# Patient Record
Sex: FEMALE | Race: BLACK OR AFRICAN AMERICAN | Employment: FULL TIME | URBAN - METROPOLITAN AREA
[De-identification: names, ages, dates, MRNs, and addresses within clinical notes are randomized per-mention and may not be internally consistent; named-entity substitution may affect disease eponyms.]

---

## 2017-05-19 ENCOUNTER — HOSPITAL ENCOUNTER (EMERGENCY)
Age: 32
Discharge: HOME | End: 2017-05-19
Attending: EMERGENCY MEDICINE
Payer: COMMERCIAL

## 2017-05-19 ENCOUNTER — APPOINTMENT (OUTPATIENT)
Dept: CT IMAGING | Age: 32
End: 2017-05-19
Payer: COMMERCIAL

## 2017-05-19 ENCOUNTER — APPOINTMENT (OUTPATIENT)
Dept: GENERAL RADIOLOGY | Age: 32
End: 2017-05-19
Payer: COMMERCIAL

## 2017-05-19 VITALS
DIASTOLIC BLOOD PRESSURE: 64 MMHG | RESPIRATION RATE: 16 BRPM | HEIGHT: 66 IN | BODY MASS INDEX: 22.5 KG/M2 | HEART RATE: 77 BPM | OXYGEN SATURATION: 98 % | SYSTOLIC BLOOD PRESSURE: 120 MMHG | WEIGHT: 140 LBS | TEMPERATURE: 98.1 F

## 2017-05-19 DIAGNOSIS — R07.9 NONSPECIFIC CHEST PAIN: Primary | ICD-10-CM

## 2017-05-19 DIAGNOSIS — F43.9 SITUATIONAL STRESS: ICD-10-CM

## 2017-05-19 LAB
% CKMB: 1.2 % (ref 0–3)
ABSOLUTE EOS #: 0.2 K/UL (ref 0–0.4)
ABSOLUTE LYMPH #: 3.2 K/UL (ref 1–4.8)
ABSOLUTE MONO #: 0.5 K/UL (ref 0.2–0.8)
ANION GAP SERPL CALCULATED.3IONS-SCNC: 15 MMOL/L (ref 9–17)
BASOPHILS # BLD: 0 %
BASOPHILS ABSOLUTE: 0 K/UL (ref 0–0.2)
BNP INTERPRETATION: NORMAL
BUN BLDV-MCNC: 12 MG/DL (ref 6–20)
BUN/CREAT BLD: 18 (ref 9–20)
CALCIUM SERPL-MCNC: 9.2 MG/DL (ref 8.6–10.4)
CHLORIDE BLD-SCNC: 97 MMOL/L (ref 98–107)
CK MB: 1.2 NG/ML
CKMB INTERPRETATION: NORMAL
CO2: 26 MMOL/L (ref 20–31)
CREAT SERPL-MCNC: 0.67 MG/DL (ref 0.5–0.9)
DIFFERENTIAL TYPE: NORMAL
EOSINOPHILS RELATIVE PERCENT: 3 %
GFR AFRICAN AMERICAN: >60 ML/MIN
GFR NON-AFRICAN AMERICAN: >60 ML/MIN
GFR SERPL CREATININE-BSD FRML MDRD: ABNORMAL ML/MIN/{1.73_M2}
GFR SERPL CREATININE-BSD FRML MDRD: ABNORMAL ML/MIN/{1.73_M2}
GLUCOSE BLD-MCNC: 99 MG/DL (ref 70–99)
HCT VFR BLD CALC: 36.3 % (ref 36–46)
HEMOGLOBIN: 12.1 G/DL (ref 12–16)
INR BLD: 1
LYMPHOCYTES # BLD: 48 %
MAGNESIUM: 2 MG/DL (ref 1.6–2.6)
MCH RBC QN AUTO: 28.9 PG (ref 26–34)
MCHC RBC AUTO-ENTMCNC: 33.3 G/DL (ref 31–37)
MCV RBC AUTO: 86.9 FL (ref 80–100)
MONOCYTES # BLD: 7 %
MYOGLOBIN: <21 NG/ML (ref 25–58)
PARTIAL THROMBOPLASTIN TIME: 29.9 SEC (ref 23–31)
PDW BLD-RTO: 11.8 % (ref 11.5–14.5)
PLATELET # BLD: 232 K/UL (ref 130–400)
PLATELET ESTIMATE: NORMAL
PMV BLD AUTO: 8.6 FL (ref 6–12)
POTASSIUM SERPL-SCNC: 3.9 MMOL/L (ref 3.7–5.3)
PRO-BNP: 24 PG/ML
PROTHROMBIN TIME: 10.6 SEC (ref 9.7–11.6)
RBC # BLD: 4.17 M/UL (ref 4–5.2)
RBC # BLD: NORMAL 10*6/UL
SEDIMENTATION RATE, ERYTHROCYTE: 14 MM (ref 0–20)
SEG NEUTROPHILS: 42 %
SEGMENTED NEUTROPHILS ABSOLUTE COUNT: 2.8 K/UL (ref 1.8–7.7)
SODIUM BLD-SCNC: 138 MMOL/L (ref 135–144)
THYROXINE, FREE: 1.22 NG/DL (ref 0.93–1.7)
TOTAL CK: 101 U/L (ref 26–192)
TROPONIN INTERP: ABNORMAL
TROPONIN T: <0.03 NG/ML
TSH SERPL DL<=0.05 MIU/L-ACNC: 0.34 MIU/L (ref 0.3–5)
WBC # BLD: 6.7 K/UL (ref 3.5–11)
WBC # BLD: NORMAL 10*3/UL

## 2017-05-19 PROCEDURE — 82550 ASSAY OF CK (CPK): CPT

## 2017-05-19 PROCEDURE — 84443 ASSAY THYROID STIM HORMONE: CPT

## 2017-05-19 PROCEDURE — 80048 BASIC METABOLIC PNL TOTAL CA: CPT

## 2017-05-19 PROCEDURE — 71010 XR CHEST PORTABLE: CPT

## 2017-05-19 PROCEDURE — 83874 ASSAY OF MYOGLOBIN: CPT

## 2017-05-19 PROCEDURE — 84484 ASSAY OF TROPONIN QUANT: CPT

## 2017-05-19 PROCEDURE — 71260 CT THORAX DX C+: CPT

## 2017-05-19 PROCEDURE — 85651 RBC SED RATE NONAUTOMATED: CPT

## 2017-05-19 PROCEDURE — 84439 ASSAY OF FREE THYROXINE: CPT

## 2017-05-19 PROCEDURE — 82553 CREATINE MB FRACTION: CPT

## 2017-05-19 PROCEDURE — 85610 PROTHROMBIN TIME: CPT

## 2017-05-19 PROCEDURE — 2580000003 HC RX 258: Performed by: EMERGENCY MEDICINE

## 2017-05-19 PROCEDURE — 96374 THER/PROPH/DIAG INJ IV PUSH: CPT

## 2017-05-19 PROCEDURE — 83735 ASSAY OF MAGNESIUM: CPT

## 2017-05-19 PROCEDURE — 85025 COMPLETE CBC W/AUTO DIFF WBC: CPT

## 2017-05-19 PROCEDURE — 6360000004 HC RX CONTRAST MEDICATION: Performed by: EMERGENCY MEDICINE

## 2017-05-19 PROCEDURE — 85730 THROMBOPLASTIN TIME PARTIAL: CPT

## 2017-05-19 PROCEDURE — 93005 ELECTROCARDIOGRAM TRACING: CPT

## 2017-05-19 PROCEDURE — 6360000002 HC RX W HCPCS: Performed by: EMERGENCY MEDICINE

## 2017-05-19 PROCEDURE — 99285 EMERGENCY DEPT VISIT HI MDM: CPT

## 2017-05-19 PROCEDURE — 83880 ASSAY OF NATRIURETIC PEPTIDE: CPT

## 2017-05-19 RX ORDER — ALPRAZOLAM 1 MG/1
1 TABLET ORAL 3 TIMES DAILY PRN
Qty: 12 TABLET | Refills: 0 | Status: SHIPPED | OUTPATIENT
Start: 2017-05-19 | End: 2018-01-19 | Stop reason: ALTCHOICE

## 2017-05-19 RX ORDER — ONDANSETRON 2 MG/ML
8 INJECTION INTRAMUSCULAR; INTRAVENOUS ONCE
Status: COMPLETED | OUTPATIENT
Start: 2017-05-19 | End: 2017-05-19

## 2017-05-19 RX ORDER — 0.9 % SODIUM CHLORIDE 0.9 %
500 INTRAVENOUS SOLUTION INTRAVENOUS ONCE
Status: COMPLETED | OUTPATIENT
Start: 2017-05-19 | End: 2017-05-19

## 2017-05-19 RX ADMIN — IOVERSOL 80 ML: 741 INJECTION INTRA-ARTERIAL; INTRAVENOUS at 21:37

## 2017-05-19 RX ADMIN — SODIUM CHLORIDE 500 ML: 9 INJECTION, SOLUTION INTRAVENOUS at 21:30

## 2017-05-19 RX ADMIN — ONDANSETRON 8 MG: 2 INJECTION INTRAMUSCULAR; INTRAVENOUS at 21:45

## 2017-05-19 ASSESSMENT — ENCOUNTER SYMPTOMS
SHORTNESS OF BREATH: 1
GASTROINTESTINAL NEGATIVE: 1
EYES NEGATIVE: 1
CHEST TIGHTNESS: 1

## 2017-05-19 ASSESSMENT — PAIN DESCRIPTION - LOCATION: LOCATION: CHEST

## 2017-05-19 ASSESSMENT — PAIN DESCRIPTION - ORIENTATION: ORIENTATION: MID

## 2017-05-19 ASSESSMENT — PAIN SCALES - GENERAL: PAINLEVEL_OUTOF10: 3

## 2017-05-19 ASSESSMENT — PAIN DESCRIPTION - PAIN TYPE: TYPE: ACUTE PAIN

## 2017-05-22 LAB
EKG ATRIAL RATE: 71 BPM
EKG P AXIS: 37 DEGREES
EKG P-R INTERVAL: 154 MS
EKG Q-T INTERVAL: 368 MS
EKG QRS DURATION: 72 MS
EKG QTC CALCULATION (BAZETT): 399 MS
EKG R AXIS: 55 DEGREES
EKG T AXIS: 28 DEGREES
EKG VENTRICULAR RATE: 71 BPM

## 2017-11-28 ENCOUNTER — HOSPITAL ENCOUNTER (OUTPATIENT)
Age: 32
Discharge: HOME | End: 2017-11-28

## 2017-11-28 ENCOUNTER — HOSPITAL ENCOUNTER (OUTPATIENT)
Dept: GENERAL RADIOLOGY | Age: 32
Discharge: HOME | End: 2017-11-28

## 2017-11-28 DIAGNOSIS — T14.90XA INJURY: ICD-10-CM

## 2017-11-28 PROCEDURE — 73564 X-RAY EXAM KNEE 4 OR MORE: CPT

## 2017-11-29 ENCOUNTER — HOSPITAL ENCOUNTER (EMERGENCY)
Age: 32
Discharge: HOME | End: 2017-11-30
Attending: EMERGENCY MEDICINE
Payer: COMMERCIAL

## 2017-11-29 ENCOUNTER — APPOINTMENT (OUTPATIENT)
Dept: GENERAL RADIOLOGY | Age: 32
End: 2017-11-29
Payer: COMMERCIAL

## 2017-11-29 VITALS
WEIGHT: 135 LBS | TEMPERATURE: 97.5 F | HEART RATE: 98 BPM | OXYGEN SATURATION: 97 % | BODY MASS INDEX: 21.69 KG/M2 | SYSTOLIC BLOOD PRESSURE: 127 MMHG | RESPIRATION RATE: 18 BRPM | HEIGHT: 66 IN | DIASTOLIC BLOOD PRESSURE: 89 MMHG

## 2017-11-29 DIAGNOSIS — M25.562 ACUTE PAIN OF LEFT KNEE: Primary | ICD-10-CM

## 2017-11-29 PROCEDURE — 6360000002 HC RX W HCPCS: Performed by: EMERGENCY MEDICINE

## 2017-11-29 PROCEDURE — 73564 X-RAY EXAM KNEE 4 OR MORE: CPT

## 2017-11-29 PROCEDURE — 99283 EMERGENCY DEPT VISIT LOW MDM: CPT

## 2017-11-29 PROCEDURE — 96372 THER/PROPH/DIAG INJ SC/IM: CPT

## 2017-11-29 RX ORDER — KETOROLAC TROMETHAMINE 30 MG/ML
30 INJECTION, SOLUTION INTRAMUSCULAR; INTRAVENOUS ONCE
Status: COMPLETED | OUTPATIENT
Start: 2017-11-29 | End: 2017-11-29

## 2017-11-29 RX ADMIN — KETOROLAC TROMETHAMINE 30 MG: 30 INJECTION, SOLUTION INTRAMUSCULAR at 23:40

## 2017-11-29 ASSESSMENT — PAIN DESCRIPTION - ORIENTATION: ORIENTATION: LEFT

## 2017-11-29 ASSESSMENT — PAIN SCALES - GENERAL
PAINLEVEL_OUTOF10: 10
PAINLEVEL_OUTOF10: 10

## 2017-11-29 ASSESSMENT — PAIN DESCRIPTION - PAIN TYPE: TYPE: ACUTE PAIN

## 2017-11-29 ASSESSMENT — PAIN DESCRIPTION - LOCATION: LOCATION: KNEE

## 2017-11-29 ASSESSMENT — PAIN DESCRIPTION - DESCRIPTORS: DESCRIPTORS: SHOOTING

## 2017-11-30 RX ORDER — IBUPROFEN 800 MG/1
800 TABLET ORAL EVERY 8 HOURS PRN
Qty: 30 TABLET | Refills: 0 | Status: SHIPPED | OUTPATIENT
Start: 2017-11-30 | End: 2018-01-19 | Stop reason: ALTCHOICE

## 2017-11-30 RX ORDER — DIAZEPAM 2 MG/1
2 TABLET ORAL EVERY 8 HOURS PRN
Qty: 10 TABLET | Refills: 0 | Status: SHIPPED | OUTPATIENT
Start: 2017-11-30 | End: 2017-12-10

## 2017-11-30 ASSESSMENT — ENCOUNTER SYMPTOMS
SHORTNESS OF BREATH: 0
NAUSEA: 0
STRIDOR: 0
WHEEZING: 0
VOMITING: 0
COUGH: 0
BACK PAIN: 0
ABDOMINAL PAIN: 0
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
## 2017-11-30 NOTE — ED PROVIDER NOTES
[address] [facility]     Emergency Department     Faculty Attestation        I performed a history and physical examination of the patient and discussed management with the resident. I reviewed the residents note and agree with the documented findings and plan of care. Any areas of disagreement are noted on the chart. I was personally present for the key portions of any procedures. I have documented in the chart those procedures where I was not present during the key portions. I have reviewed the emergency nurses triage note. I agree with the chief complaint, past medical history, past surgical history, allergies, medications, social and family history as documented unless otherwise noted below. For Physician Assistant/ Nurse Practitioner cases/documentation I have personally evaluated this patient and have completed at least one if not all key elements of the E/M (history, physical exam, and MDM). Additional findings are as noted. PCP:  Faye Ramos MD    Pertinent Comments:         Critical Care  None    Note, if the patient's blood pressure was elevated, and they have no history of hypertension, they were informed of the following: The patient may have Pre-hypertension/Hypertension: The patient has been informed that they may have pre-hypertension or Hypertension based on a blood pressure reading in the emergency department. I recommend that the patient call the primary care provider listed on their discharge instructions or a physician of their choice this week to arrange follow up for further evaluation of possible pre-hypertension or Hypertension. (Please note that portions of this note were completed with a voice recognition program. Efforts were made to edit the dictations but occasionally words are mis-transcribed.  Whenever words are used in this note in any gender, they shall be construed as though they were used in the gender appropriate to the
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
## 2018-01-05 ENCOUNTER — HOSPITAL ENCOUNTER (OUTPATIENT)
Dept: PHYSICAL THERAPY | Age: 33
Setting detail: THERAPIES SERIES
Discharge: HOME | End: 2018-01-05
Payer: COMMERCIAL

## 2018-01-05 PROCEDURE — 97110 THERAPEUTIC EXERCISES: CPT

## 2018-01-05 PROCEDURE — 97161 PT EVAL LOW COMPLEX 20 MIN: CPT

## 2018-01-05 PROCEDURE — 97016 VASOPNEUMATIC DEVICE THERAPY: CPT
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
## 2018-01-09 ENCOUNTER — HOSPITAL ENCOUNTER (OUTPATIENT)
Dept: PHYSICAL THERAPY | Age: 33
Setting detail: THERAPIES SERIES
Discharge: HOME | End: 2018-01-09
Payer: COMMERCIAL

## 2018-01-09 PROCEDURE — 97016 VASOPNEUMATIC DEVICE THERAPY: CPT

## 2018-01-09 PROCEDURE — 97110 THERAPEUTIC EXERCISES: CPT

## 2018-01-09 NOTE — FLOWSHEET NOTE
positive, initiated ITB stretch and VMO strengthening. Pt reports pain 3/10 after ex. Cont game ready at end of Rx to decrease soreness. Educated Pt in patella lat gliding, Pt reports her brace has support for patella, states it helps when she wears it-educated Pt to wear if doing large amount walking, activity. Specific Instructions for next treatment:progress VMO strengthening-consider adding ball between knees to SAQ, LAQ, bridges; Begin ionto to left patellar tendon if received authorization, monitor tolerance to game ready. Treatment Charges: Mins Units   []  Modalities     [x]  Ther Exercise 30 2   []  Manual Therapy     []  Ther Activities     []  Aquatics     [x]  Vasocompression 15 1   []  Other     Total Treatment time 45 min        Assessment: [x] Progressing toward goals. [] No change. [] Other:    STG: (to be met in 10 treatments)  1. ? Pain: Decrease pain 2 levels  2. ? ROM: Increase left knee flexion to 140° (equal to the right)  3. ? Strength: Increase quad and hamstring strength to 4+/5, increase left hip strength to 4+/5  4. ? Function: Improve LEFS by 10%   5. Independent with Home Exercise Programs  LTG: (to be met in 18 treatments)  1. Pt will have no greater than 1/10 pain with activity  2. Pt will have 5/5 left knee and hip strength  3. Pt will be able to return to full duty at work   4. Improve LEFS to without impairment    Pt. Education:  [x] Yes  [] No  [] Reviewed Prior HEP/Ed  Method of Education: [x] Verbal  [x] Demo  [] Written  Comprehension of Education:  [x] Verbalizes understanding-1/9 purpose, correct technique new ex, knee mechanics, patella lateral tracking  [] Demonstrates understanding. [] Needs review. [] Demonstrates/verbalizes HEP/Ed previously given. Plan: [x] Continue per plan of care.    [] Other:      Time [id number]            Time Out: 5983    Electronically signed by:  Librado Yu, PT

## 2018-01-11 ENCOUNTER — HOSPITAL ENCOUNTER (OUTPATIENT)
Dept: PHYSICAL THERAPY | Age: 33
Setting detail: THERAPIES SERIES
Discharge: HOME | End: 2018-01-11
Payer: COMMERCIAL

## 2018-01-11 PROCEDURE — 97016 VASOPNEUMATIC DEVICE THERAPY: CPT

## 2018-01-11 PROCEDURE — 97110 THERAPEUTIC EXERCISES: CPT

## 2018-01-12 ENCOUNTER — HOSPITAL ENCOUNTER (OUTPATIENT)
Dept: PHYSICAL THERAPY | Age: 33
Setting detail: THERAPIES SERIES
Discharge: HOME | End: 2018-01-12
Payer: COMMERCIAL

## 2018-01-12 NOTE — FLOWSHEET NOTE
[] [facility]        [facility] Physical                Therapy       [address]       Phone: [phone]       Fax: [phone] [] [facility] at [address]       Phone: [phone]       Fax: [phone] [] [place]. [address] [place] [address]      Phone: [phone]      Fax:  [phone]     Physical Therapy Cancel/No Show note    Date: 2018  Patient: Melva Coley  : 1985  MRN: [id number]    Cancels/No Shows to date:     For today's appointment patient:  [x]  Cancelled  []  Rescheduled appointment  []  No-show     Reason given by patient:  []  Patient ill  []  Conflicting appointment  []  No transportation    []  Conflict with work  [x]  No reason given  []  Weather related  []  Other:      Comments: Likely weather related, pt requested to be called.         []  Next appointment was confirmed    Electronically signed by: David Escobar PT

## 2018-01-16 ENCOUNTER — HOSPITAL ENCOUNTER (OUTPATIENT)
Dept: PHYSICAL THERAPY | Age: 33
Setting detail: THERAPIES SERIES
Discharge: HOME | End: 2018-01-16
Payer: COMMERCIAL

## 2018-01-16 PROCEDURE — 97110 THERAPEUTIC EXERCISES: CPT

## 2018-01-16 PROCEDURE — 97016 VASOPNEUMATIC DEVICE THERAPY: CPT

## 2018-01-17 ENCOUNTER — HOSPITAL ENCOUNTER (OUTPATIENT)
Dept: PHYSICAL THERAPY | Age: 33
Setting detail: THERAPIES SERIES
Discharge: HOME | End: 2018-01-17
Payer: COMMERCIAL

## 2018-01-17 PROCEDURE — 97110 THERAPEUTIC EXERCISES: CPT

## 2018-01-17 PROCEDURE — 97016 VASOPNEUMATIC DEVICE THERAPY: CPT

## 2018-01-17 NOTE — FLOWSHEET NOTE
[x] [facility]       Outpatient Physical        Therapy       [address]       Phone: [phone]       Fax: [phone] [] [facility] Promotion at [address] [facility]       Phone: [phone]       Fax: [phone] [] [place] Promotion  [address] [place] [address]   Phone: [phone]   Fax:  [phone]     Physical Therapy Daily Treatment Note    Date:  2018  Patient Name:  Sher Seen    :  1985  MRN: [id number]  Physician: Gautam Draling MD  Insurance: [id number] (12 Rx until 18)  Medical Diagnosis: Left knee contusion- S80. 02XA              Rehab Codes: M25.562, M25.662, M62.81  Onset date: 17                           Next Dr's appt.: When PT completed     Visit# / total visits:   Cancels/No Shows: 0/0    Subjective:   Pain:  [x] Yes  [] No Location: L knee Pain Rating: (0-10 scale) 0/10  Pain altered Tx:  [] No  [] Yes  Action:  Comments:  No c/o soreness after yesterday's session and states \"This tape is a miracle worker\". States that since getting her knee taped she has been without the clicking in her knee. No c/o pain currently.       Objective:  Modalities: GameReady machine, Medium pressure, 40°, pt supine with legs on purple wedge  Precautions:LATEX allergy  Exercises:  Exercise Reps/ Time Weight/ Level Comments   SciFit/Nustep 8 min L 5    LAQ 20x 1lb Added    Supine       Quad sets 15x 3sec Increased reps     Add sets  15x 3sec Added  Increased reps    SAQ 15x 1lb Added  With ADD isometric    SLR 15x  Added    SLR in ER 15x  Added    Bridges w/ tband around thighs [facility] latex-free) 15x 3sec Progressed reps          Sidelying      ITB stretch   Added    Clamshell  15x Orange latex-free tband Progressed reps    Hip abd 15x     Hip add   Added          Prone      Hip ext 15x     Knee flexion 15x           Standing      TKE 15x Blue

## 2018-01-19 ENCOUNTER — APPOINTMENT (OUTPATIENT)
Dept: CT IMAGING | Age: 33
End: 2018-01-19
Payer: COMMERCIAL

## 2018-01-19 ENCOUNTER — APPOINTMENT (OUTPATIENT)
Dept: GENERAL RADIOLOGY | Age: 33
End: 2018-01-19
Payer: COMMERCIAL

## 2018-01-19 ENCOUNTER — HOSPITAL ENCOUNTER (EMERGENCY)
Age: 33
Discharge: HOME | End: 2018-01-19
Attending: EMERGENCY MEDICINE
Payer: COMMERCIAL

## 2018-01-19 ENCOUNTER — HOSPITAL ENCOUNTER (OUTPATIENT)
Dept: PHYSICAL THERAPY | Age: 33
Setting detail: THERAPIES SERIES
Discharge: HOME | End: 2018-01-19
Payer: COMMERCIAL

## 2018-01-19 VITALS
HEART RATE: 71 BPM | TEMPERATURE: 97.5 F | HEIGHT: 66 IN | WEIGHT: 140 LBS | SYSTOLIC BLOOD PRESSURE: 138 MMHG | RESPIRATION RATE: 16 BRPM | BODY MASS INDEX: 22.5 KG/M2 | DIASTOLIC BLOOD PRESSURE: 92 MMHG | OXYGEN SATURATION: 99 %

## 2018-01-19 DIAGNOSIS — R51.9 NONINTRACTABLE HEADACHE, UNSPECIFIED CHRONICITY PATTERN, UNSPECIFIED HEADACHE TYPE: ICD-10-CM

## 2018-01-19 DIAGNOSIS — R55 SYNCOPE, UNSPECIFIED SYNCOPE TYPE: ICD-10-CM

## 2018-01-19 DIAGNOSIS — H83.09 LABYRINTHITIS, UNSPECIFIED LATERALITY: Primary | ICD-10-CM

## 2018-01-19 LAB
% CKMB: 1.6 % (ref 0–3)
ABSOLUTE EOS #: 0.18 K/UL (ref 0–0.4)
ABSOLUTE IMMATURE GRANULOCYTE: ABNORMAL K/UL (ref 0–0.3)
ABSOLUTE LYMPH #: 3.36 K/UL (ref 1–4.8)
ABSOLUTE MONO #: 0.36 K/UL (ref 0.2–0.8)
ALBUMIN SERPL-MCNC: 4.7 G/DL (ref 3.5–5.2)
ALBUMIN/GLOBULIN RATIO: NORMAL (ref 1–2.5)
ALP BLD-CCNC: 75 U/L (ref 35–104)
ALT SERPL-CCNC: 29 U/L (ref 5–33)
AMPHETAMINE SCREEN URINE: NEGATIVE
ANION GAP SERPL CALCULATED.3IONS-SCNC: 15 MMOL/L (ref 9–17)
AST SERPL-CCNC: 26 U/L
BARBITURATE SCREEN URINE: NEGATIVE
BASOPHILS # BLD: 1 %
BASOPHILS ABSOLUTE: 0.06 K/UL (ref 0–0.2)
BENZODIAZEPINE SCREEN, URINE: NEGATIVE
BILIRUB SERPL-MCNC: 0.42 MG/DL (ref 0.3–1.2)
BILIRUBIN DIRECT: 0.1 MG/DL
BILIRUBIN URINE: NEGATIVE
BILIRUBIN, INDIRECT: 0.32 MG/DL (ref 0–1)
BUN BLDV-MCNC: 7 MG/DL (ref 6–20)
BUN/CREAT BLD: 10 (ref 9–20)
BUPRENORPHINE URINE: NORMAL
CALCIUM SERPL-MCNC: 9.9 MG/DL (ref 8.6–10.4)
CANNABINOID SCREEN URINE: NEGATIVE
CHLORIDE BLD-SCNC: 95 MMOL/L (ref 98–107)
CK MB: 1.6 NG/ML
CKMB INTERPRETATION: NORMAL
CO2: 26 MMOL/L (ref 20–31)
COCAINE METABOLITE, URINE: NEGATIVE
COLOR: YELLOW
COMMENT UA: NORMAL
CREAT SERPL-MCNC: 0.69 MG/DL (ref 0.5–0.9)
DIFFERENTIAL TYPE: ABNORMAL
EKG ATRIAL RATE: 74 BPM
EKG P AXIS: 62 DEGREES
EKG P-R INTERVAL: 162 MS
EKG Q-T INTERVAL: 384 MS
EKG QRS DURATION: 78 MS
EKG QTC CALCULATION (BAZETT): 426 MS
EKG R AXIS: 52 DEGREES
EKG T AXIS: 43 DEGREES
EKG VENTRICULAR RATE: 74 BPM
EOSINOPHILS RELATIVE PERCENT: 3 % (ref 1–4)
GFR AFRICAN AMERICAN: >60 ML/MIN
GFR NON-AFRICAN AMERICAN: >60 ML/MIN
GFR SERPL CREATININE-BSD FRML MDRD: ABNORMAL ML/MIN/{1.73_M2}
GFR SERPL CREATININE-BSD FRML MDRD: ABNORMAL ML/MIN/{1.73_M2}
GLOBULIN: NORMAL G/DL (ref 1.5–3.8)
GLUCOSE BLD-MCNC: 88 MG/DL (ref 70–99)
GLUCOSE URINE: NEGATIVE
HCT VFR BLD CALC: 39.3 % (ref 36–46)
HEMOGLOBIN: 12.7 G/DL (ref 12–16)
IMMATURE GRANULOCYTES: ABNORMAL %
KETONES, URINE: NEGATIVE
LEUKOCYTE ESTERASE, URINE: NEGATIVE
LYMPHOCYTES # BLD: 56 % (ref 24–44)
MAGNESIUM: 2.1 MG/DL (ref 1.6–2.6)
MCH RBC QN AUTO: 28.3 PG (ref 26–34)
MCHC RBC AUTO-ENTMCNC: 32.2 G/DL (ref 31–37)
MCV RBC AUTO: 88 FL (ref 80–100)
MDMA URINE: NORMAL
METHADONE SCREEN, URINE: NEGATIVE
METHAMPHETAMINE, URINE: NORMAL
MONOCYTES # BLD: 6 % (ref 1–7)
MYOGLOBIN: <21 NG/ML (ref 25–58)
NITRITE, URINE: NEGATIVE
NRBC AUTOMATED: ABNORMAL PER 100 WBC
OPIATES, URINE: NEGATIVE
OXYCODONE SCREEN URINE: NEGATIVE
PDW BLD-RTO: 11.6 % (ref 11.5–14.5)
PH UA: 6 (ref 5–8)
PHENCYCLIDINE, URINE: NEGATIVE
PLATELET # BLD: 266 K/UL (ref 130–400)
PLATELET ESTIMATE: ABNORMAL
PMV BLD AUTO: 8.4 FL (ref 6–12)
POTASSIUM SERPL-SCNC: 3.5 MMOL/L (ref 3.7–5.3)
PROPOXYPHENE, URINE: NORMAL
PROTEIN UA: NEGATIVE
RBC # BLD: 4.47 M/UL (ref 4–5.2)
RBC # BLD: ABNORMAL 10*6/UL
SEG NEUTROPHILS: 34 % (ref 36–66)
SEGMENTED NEUTROPHILS ABSOLUTE COUNT: 2.04 K/UL (ref 1.8–7.7)
SODIUM BLD-SCNC: 136 MMOL/L (ref 135–144)
SPECIFIC GRAVITY UA: 1.02 (ref 1–1.03)
TEST INFORMATION: NORMAL
TOTAL CK: 102 U/L (ref 26–192)
TOTAL PROTEIN: 8.2 G/DL (ref 6.4–8.3)
TRICYCLIC ANTIDEPRESSANTS, UR: NORMAL
TROPONIN INTERP: ABNORMAL
TROPONIN T: <0.03 NG/ML
TURBIDITY: CLEAR
URINE HGB: NEGATIVE
UROBILINOGEN, URINE: NORMAL
WBC # BLD: 6 K/UL (ref 3.5–11)
WBC # BLD: ABNORMAL 10*3/UL

## 2018-01-19 PROCEDURE — 96361 HYDRATE IV INFUSION ADD-ON: CPT

## 2018-01-19 PROCEDURE — 6360000002 HC RX W HCPCS: Performed by: EMERGENCY MEDICINE

## 2018-01-19 PROCEDURE — 96375 TX/PRO/DX INJ NEW DRUG ADDON: CPT

## 2018-01-19 PROCEDURE — 81003 URINALYSIS AUTO W/O SCOPE: CPT

## 2018-01-19 PROCEDURE — 93005 ELECTROCARDIOGRAM TRACING: CPT

## 2018-01-19 PROCEDURE — 80048 BASIC METABOLIC PNL TOTAL CA: CPT

## 2018-01-19 PROCEDURE — 6370000000 HC RX 637 (ALT 250 FOR IP): Performed by: EMERGENCY MEDICINE

## 2018-01-19 PROCEDURE — 84484 ASSAY OF TROPONIN QUANT: CPT

## 2018-01-19 PROCEDURE — 2580000003 HC RX 258: Performed by: EMERGENCY MEDICINE

## 2018-01-19 PROCEDURE — 82553 CREATINE MB FRACTION: CPT

## 2018-01-19 PROCEDURE — 99284 EMERGENCY DEPT VISIT MOD MDM: CPT

## 2018-01-19 PROCEDURE — 71045 X-RAY EXAM CHEST 1 VIEW: CPT

## 2018-01-19 PROCEDURE — 96374 THER/PROPH/DIAG INJ IV PUSH: CPT

## 2018-01-19 PROCEDURE — 83874 ASSAY OF MYOGLOBIN: CPT

## 2018-01-19 PROCEDURE — 87086 URINE CULTURE/COLONY COUNT: CPT

## 2018-01-19 PROCEDURE — 97033 APP MDLTY 1+IONTPHRSIS EA 15: CPT

## 2018-01-19 PROCEDURE — 85025 COMPLETE CBC W/AUTO DIFF WBC: CPT

## 2018-01-19 PROCEDURE — 82550 ASSAY OF CK (CPK): CPT

## 2018-01-19 PROCEDURE — 70450 CT HEAD/BRAIN W/O DYE: CPT

## 2018-01-19 PROCEDURE — 80307 DRUG TEST PRSMV CHEM ANLYZR: CPT

## 2018-01-19 PROCEDURE — 80076 HEPATIC FUNCTION PANEL: CPT

## 2018-01-19 PROCEDURE — 83735 ASSAY OF MAGNESIUM: CPT

## 2018-01-19 PROCEDURE — 97110 THERAPEUTIC EXERCISES: CPT

## 2018-01-19 RX ORDER — 0.9 % SODIUM CHLORIDE 0.9 %
1000 INTRAVENOUS SOLUTION INTRAVENOUS ONCE
Status: COMPLETED | OUTPATIENT
Start: 2018-01-19 | End: 2018-01-19

## 2018-01-19 RX ORDER — MECLIZINE HYDROCHLORIDE 25 MG/1
25 TABLET ORAL EVERY 6 HOURS PRN
Qty: 20 TABLET | Refills: 0 | Status: SHIPPED | OUTPATIENT
Start: 2018-01-19 | End: 2019-12-08

## 2018-01-19 RX ORDER — SODIUM CHLORIDE 9 MG/ML
INJECTION, SOLUTION INTRAVENOUS CONTINUOUS
Status: DISCONTINUED | OUTPATIENT
Start: 2018-01-19 | End: 2018-01-20 | Stop reason: HOSPADM

## 2018-01-19 RX ORDER — MECLIZINE HCL 12.5 MG/1
25 TABLET ORAL ONCE
Status: COMPLETED | OUTPATIENT
Start: 2018-01-19 | End: 2018-01-19

## 2018-01-19 RX ORDER — ONDANSETRON 4 MG/1
4 TABLET, ORALLY DISINTEGRATING ORAL EVERY 6 HOURS PRN
Qty: 20 TABLET | Refills: 0 | Status: SHIPPED | OUTPATIENT
Start: 2018-01-19 | End: 2019-12-09

## 2018-01-19 RX ORDER — LORAZEPAM 2 MG/ML
1 INJECTION INTRAMUSCULAR ONCE
Status: COMPLETED | OUTPATIENT
Start: 2018-01-19 | End: 2018-01-19

## 2018-01-19 RX ORDER — FEXOFENADINE HCL 180 MG/1
180 TABLET ORAL DAILY
Qty: 7 TABLET | Refills: 0 | Status: SHIPPED | OUTPATIENT
Start: 2018-01-19 | End: 2018-01-26

## 2018-01-19 RX ORDER — ONDANSETRON 2 MG/ML
8 INJECTION INTRAMUSCULAR; INTRAVENOUS ONCE
Status: COMPLETED | OUTPATIENT
Start: 2018-01-19 | End: 2018-01-19

## 2018-01-19 RX ORDER — HYDROCODONE BITARTRATE AND ACETAMINOPHEN 5; 325 MG/1; MG/1
1 TABLET ORAL EVERY 6 HOURS PRN
Qty: 10 TABLET | Refills: 0 | Status: SHIPPED | OUTPATIENT
Start: 2018-01-19 | End: 2018-01-26

## 2018-01-19 RX ADMIN — MECLIZINE HCL 25 MG: 12.5 TABLET ORAL at 21:23

## 2018-01-19 RX ADMIN — LORAZEPAM 1 MG: 2 INJECTION INTRAMUSCULAR; INTRAVENOUS at 21:22

## 2018-01-19 RX ADMIN — SODIUM CHLORIDE: 9 INJECTION, SOLUTION INTRAVENOUS at 22:36

## 2018-01-19 RX ADMIN — SODIUM CHLORIDE 1000 ML: 9 INJECTION, SOLUTION INTRAVENOUS at 21:21

## 2018-01-19 RX ADMIN — ONDANSETRON 8 MG: 2 INJECTION INTRAMUSCULAR; INTRAVENOUS at 21:21

## 2018-01-19 ASSESSMENT — PAIN DESCRIPTION - DESCRIPTORS: DESCRIPTORS: ACHING;DISCOMFORT

## 2018-01-19 ASSESSMENT — PAIN DESCRIPTION - LOCATION: LOCATION: HEAD

## 2018-01-19 ASSESSMENT — ENCOUNTER SYMPTOMS
VOMITING: 1
NAUSEA: 1
EYES NEGATIVE: 1
RESPIRATORY NEGATIVE: 1

## 2018-01-19 ASSESSMENT — PAIN DESCRIPTION - PAIN TYPE: TYPE: ACUTE PAIN

## 2018-01-19 ASSESSMENT — PAIN SCALES - GENERAL: PAINLEVEL_OUTOF10: 8

## 2018-01-20 LAB
CULTURE: NORMAL
CULTURE: NORMAL
Lab: NORMAL
SPECIMEN DESCRIPTION: NORMAL
SPECIMEN DESCRIPTION: NORMAL
STATUS: NORMAL

## 2018-01-20 NOTE — ED PROVIDER NOTES
[address] ED  eMERGENCY dEPARTMENT eNCOUnter      Pt Name: Artemio Tracy  MRN: [id number]  [place] 1985  Date of evaluation: 1/19/2018  Provider: Vickie Batres MD    CHIEF COMPLAINT       Chief Complaint   Patient presents with    Loss of Consciousness         HISTORY OF PRESENT ILLNESS  (Location/Symptom, Timing/Onset, Context/Setting, Quality, Duration, Modifying Factors, Severity.)   Artemio Tracy is a 27 y.o. female who presents to the emergency department for headache and dizziness symptoms . Patient states she has had a frontal headache all day. Patient was with family this evening, they were in a drive thru waiting on a food order. Patient stated that she felt like she was going to pass out, like \"something wasn't right\". She asked family to bring her to the ED. On arrival, while standing at registration patient experienced a short syncopal episode. No injury or trauma, no seizure activity. At bedside she continues to endorse dizziness and spinning. No abd pain, fevers, ear discomfort or sore throat. Some frontal discomfort remains. Nursing Notes were reviewed.     ALLERGIES     Latex and Reglan [metoclopramide]    CURRENT MEDICATIONS       Previous Medications    ESTROGENS CONJ SYNTHETIC A PO    Take by mouth       PAST MEDICAL HISTORY         Diagnosis Date    Anxiety     Nausea        SURGICAL HISTORY           Procedure Laterality Date    COSMETIC SURGERY      OVARY REMOVAL Left     TUBAL LIGATION Bilateral 20134    UPPER GASTROINTESTINAL ENDOSCOPY  12/2/15         FAMILY HISTORY           Problem Relation Age of Onset    Diabetes Mother     Cancer Paternal Uncle      stomach    Cancer Paternal Grandmother      breast, stomach    Diabetes Paternal Grandmother     High Blood Pressure Paternal Grandfather      Family Status   Relation Status    Mother Alive    Father Alive    Paternal Uncle Alive    Paternal Grandmother Alive    Paternal Grandfather radiographic images are visualized and preliminarily interpreted by the emergency physician with the below findings:    CT Head WO Contrast   Status: Final result   Order Providers     Authorizing Billing   MD Amada Deluna MD          Signed by     Signed Date/Time  Phone Pager   Suri Chi 1/19/2018 22:21 [phone]    [facility]     Read Date Phone Pager   Suri Chi Jan 19, 2018 [phone]    Narrative   EXAMINATION:   CT OF THE HEAD WITHOUT CONTRAST  1/19/2018 9:34 pm       TECHNIQUE:   CT of the head was performed without the administration of intravenous   contrast. Dose modulation, iterative reconstruction, and/or weight based   adjustment of the mA/kV was utilized to reduce the radiation dose to as low   as reasonably achievable.       COMPARISON:   None.       HISTORY:   ORDERING SYSTEM PROVIDED HISTORY: ha syncope       FINDINGS:   BRAIN/VENTRICLES: There is no acute intracranial hemorrhage, mass effect or   midline shift.  No abnormal extra-axial fluid collection.  The gray-white   differentiation is maintained without evidence of an acute infarct.  There is   no evidence of hydrocephalus.       ORBITS: The visualized portion of the orbits demonstrate no acute abnormality.       SINUSES: The visualized paranasal sinuses and mastoid air cells demonstrate   no acute abnormality.       SOFT TISSUES/SKULL:  No acute abnormality of the visualized skull or soft   tissues.           Impression   No acute intracranial abnormality.             Interpretation per the Radiologist below, if available at the time of this note:    XR CHEST PORTABLE   Final Result   No acute cardiopulmonary abnormality. CT Head WO Contrast   Final Result   No acute intracranial abnormality.                LABS:  Labs Reviewed   BASIC METABOLIC PANEL - Abnormal; Notable for the following:        Result Value    Potassium 3.5 (*)     Chloride 95 (*)     All other components within normal limits   CBC WITH AUTO DIFFERENTIAL - Abnormal; Notable for the following:     Seg Neutrophils 34 (*)     Lymphocytes 56 (*)     All other components within normal limits   TROP/MYOGLOBIN - Abnormal; Notable for the following:     Myoglobin <21 (*)     All other components within normal limits   URINE CULTURE   HEPATIC FUNCTION PANEL   URINALYSIS   CK ISOENZYMES   MAGNESIUM   URINE DRUG SCREEN     Results for Franki Dubin (MRN [id number]) as of 1/19/2018 21:44   Ref. Range 1/19/2018 21:05 1/19/2018 21:34   Sodium Latest Ref Range: 135 - 144 mmol/L 136    Potassium Latest Ref Range: 3.7 - 5.3 mmol/L 3.5 (L)    Chloride Latest Ref Range: 98 - 107 mmol/L 95 (L)    CO2 Latest Ref Range: 20 - 31 mmol/L 26    BUN Latest Ref Range: 6 - 20 mg/dL 7    Creatinine Latest Ref Range: 0.50 - 0.90 mg/dL 0.69    Bun/Cre Ratio Latest Ref Range: 9 - 20  10    Anion Gap Latest Ref Range: 9 - 17 mmol/L 15    GFR Non- Latest Ref Range: >60 mL/min >60    GFR African American Latest Ref Range: >60 mL/min >60    Magnesium Latest Ref Range: 1.6 - 2.6 mg/dL 2.1    Glucose Latest Ref Range: 70 - 99 mg/dL 88    Calcium Latest Ref Range: 8.6 - 10.4 mg/dL 9.9    Albumin/Globulin Ratio Latest Ref Range: 1.0 - 2.5  NOT REPORTED    Total Protein Latest Ref Range: 6.4 - 8.3 g/dL 8.2    GFR Comment Unknown Pend    GFR Staging Unknown NOT REPORTED    Total CK Latest Ref Range: 26 - 192 U/L 102    % CKMB Latest Ref Range: 0.0 - 3.0 % 1.6    CK-MB Latest Ref Range: <5.4 ng/mL 1.6    CKMB Interpretation Unknown NORMAL ISOENZYME . [place]     Myoglobin Latest Ref Range: 25 - 58 ng/mL <21 (L)    Troponin T Latest Ref Range: <0.03 ng/mL <0.03    Troponin Interp Unknown Pend    Albumin Latest Ref Range: 3.5 - 5.2 g/dL 4.7    Globulin Latest Ref Range: 1.5 - 3.8 g/dL NOT REPORTED    Alk Phos Latest Ref Range: 35 - 104 U/L 75    ALT Latest Ref Range: 5 - 33 U/L 29    AST Latest Ref Range: <32 U/L 26    Bilirubin Latest Ref Range: 0.3 - to 7 days. MECLIZINE (ANTIVERT) 25 MG TABLET    Take 1 tablet by mouth every 6 hours as needed for Dizziness or Nausea    ONDANSETRON (ZOFRAN ODT) 4 MG DISINTEGRATING TABLET    Take 1 tablet by mouth every 6 hours as needed (dizziness, nausea/vomiting)       Controlled Substances Monitoring:     Attestation: The Prescription Monitoring Report for this patient was reviewed today.  Anu Champagne MD)  Documentation: No signs of potential drug abuse or diversion identified. Anu Champagne MD)    (Please note that portions of this note were completed with a voice recognition program.  Efforts were made to edit the dictations but occasionally words are mis-transcribed.)    Anu Champagne MD  Attending Emergency Physician         Anu Champagne MD  01/19/18 1430

## 2018-01-20 NOTE — ED NOTES
Pt presents to ED ambulatory with LOC in the ED waiting room. Pt states she was in the car with family and began to feel dizzy and nauseated. Pt states telling mom to come to ED. Pt denies v/d/c. Pt denies any urinary symptoms. Pt states having a headache this morning and it never going away. Pt states headache is in the front across her forehead. Pt rates pain 8/10. No head injury occurred with LOC. Pt is currently A&Ox4. Skin is brown, warm, and dry. Respirations are even and non-labored.       Tyrel Lucio RN  01/19/18 1730

## 2018-01-23 ENCOUNTER — HOSPITAL ENCOUNTER (OUTPATIENT)
Dept: PHYSICAL THERAPY | Age: 33
Setting detail: THERAPIES SERIES
Discharge: HOME | End: 2018-01-23
Payer: COMMERCIAL

## 2018-01-23 PROCEDURE — 97016 VASOPNEUMATIC DEVICE THERAPY: CPT

## 2018-01-23 PROCEDURE — 97110 THERAPEUTIC EXERCISES: CPT

## 2018-01-23 NOTE — FLOWSHEET NOTE
[x] Aliya To       Outpatient Physical        Therapy       [address]       Phone: [phone]       Fax: [phone] [] [facility] at [address] [facility] [address] [facility]       Phone: [phone]       Fax: [phone] [] [facility]  [address] [facility]   Phone: [phone]   Fax:  [phone]     Physical Therapy Daily Treatment Note    Date:  2018  Patient Name:  Jacqui Kyle    :  1985  MRN: [id number]  Physician: Vinita Hart MD  Insurance: [facility] (12 Rx until 18)  Medical Diagnosis: Left knee contusion- S80. 02XA              Rehab Codes: M25.562, [id number], M62.81  Onset date: 17                           Next Dr's appt. : 18     Visit# / total visits:   Cancels/No Shows: 0/0    Subjective:   Pain:  [x] Yes  [] No Location: L knee Pain Rating: (0-10 scale) 0/10  Pain altered Tx:  [x] No  [] Yes  Action:  Comments:  States that her knee is coming along, no pain currently. States that she passed out on Friday so she took it easy all day Friday. Pt also states that she had a headache that day, unsure if this was from the patch. Gave pt the option of discontinuing the patch and only doing the kinesiotape and the pt requested to try the regular strength patch as she feels her headache was from not eating much that day, only toast and coffee.       Objective:  Modalities: Iontopatch 2/6 Regular strength to left patellar tendon on 18; Paxfire machine, Medium pressure, 40°, pt supine with legs on purple wedge  Precautions:LATEX allergy  Exercises:  Exercise Reps/ Time Weight/ Level Comments   SciFit/Nustep 8 min L 5    LAQ 20x 1lb Added    Supine       Quad sets 20x 3sec Increased reps     Add sets  20x 3sec Added  Increased reps    SAQ 20x 1lb Added 1/9 With ADD isometric    SLR 15x 1lb Added 1/9   SLR in ER 15x 1lb Added 1/9   Bridges w/ tband

## 2018-01-24 ENCOUNTER — HOSPITAL ENCOUNTER (OUTPATIENT)
Dept: PHYSICAL THERAPY | Age: 33
Setting detail: THERAPIES SERIES
Discharge: HOME | End: 2018-01-24
Payer: COMMERCIAL

## 2018-01-26 ENCOUNTER — HOSPITAL ENCOUNTER (OUTPATIENT)
Dept: PHYSICAL THERAPY | Age: 33
Setting detail: THERAPIES SERIES
Discharge: HOME | End: 2018-01-26
Payer: COMMERCIAL

## 2018-01-26 PROCEDURE — 97016 VASOPNEUMATIC DEVICE THERAPY: CPT

## 2018-01-26 PROCEDURE — 97110 THERAPEUTIC EXERCISES: CPT

## 2018-01-26 NOTE — FLOWSHEET NOTE
15x 1lb    Knee flexion 15x 1lb          Standing      TKE 30x Blue latex-free Blue theraband (non Latex) added 1/11/18   Tband 4-way hip 15xea Green latex-free OKC + CKC   Gastroc stretch 3x30     Calf raises 20x     Wall squats-- tband around thighs 20x Orange latex-free    Heel taps 15x 4'' step                Other:     Re-eval: LEFS=10% impairment currently, =19% impairment initially   Left knee ROM: 0-140° (+10), Left knee MMT: Quads and hamstrings 4+/5 (+1/3 grade)    Trial of kinesiotape this date (1/16)--I strip across patellar tendon (75% tension in the center), I strip around the lateral border of the patella (50% tension in the center)    Specific Instructions for next treatment:  Re-tape pt's knee. Treatment Charges: Mins Units   []  Modalities     [x]  Ther Exercise 40 3   []  Manual Therapy     []  Ther Activities     []  Aquatics     [x]  Vasocompression 15 1   [x]  Other: Ionto 5 0   Total Treatment time 60 4       Assessment: [x] Progressing toward goals. See progress note this date. [] No change. [] Other:     STG: (to be met in 10 treatments)  1. ? Pain: Decrease pain 2 levels--MET  2. ? ROM: Increase left knee flexion to 140° (equal to the right)--MET  3. ? Strength: Increase quad and hamstring strength to 4+/5, increase left hip strength to 4+/5--MET  4. ? Function: Improve LEFS by 10%--Progress made, improved by 9%   5. Independent with Home Exercise Programs--MET  LTG: (to be met in 18 treatments)  1. Pt will have no greater than 1/10 pain with activity  2. Pt will have 5/5 left knee and hip strength  3. Pt will be able to return to full duty at work   4. Improve LEFS to without impairment    Pt. Education:  [x] Yes  [] No  [] Reviewed Prior HEP/Ed  Method of Education: [x] Verbal  [x] Demo  [] Written  Comprehension of Education:  [x] Verbalizes understanding-1/9 purpose, correct technique new ex, knee mechanics, patella lateral tracking  [x] Demonstrates understanding.   []

## 2018-01-30 ENCOUNTER — HOSPITAL ENCOUNTER (OUTPATIENT)
Dept: PHYSICAL THERAPY | Age: 33
Setting detail: THERAPIES SERIES
Discharge: HOME | End: 2018-01-30
Payer: COMMERCIAL

## 2018-01-30 PROCEDURE — 97016 VASOPNEUMATIC DEVICE THERAPY: CPT

## 2018-01-30 PROCEDURE — 97110 THERAPEUTIC EXERCISES: CPT

## 2018-01-30 NOTE — FLOWSHEET NOTE
[] Written  Comprehension of Education:  [x] Verbalizes understanding-1/9 purpose, correct technique new ex, knee mechanics, patella lateral tracking  [x] Demonstrates understanding. [] Needs review. [x] Demonstrates/verbalizes HEP/Ed previously given. Pt given updated HEP 1/11/18      Plan: [x] Continue per plan of care.    [] Other:      Time In: 9:05 am              Time Out: 10:15 am    Electronically signed by:  Jignesh Barnhart PT

## 2018-01-31 PROCEDURE — 97016 VASOPNEUMATIC DEVICE THERAPY: CPT

## 2018-01-31 PROCEDURE — 97110 THERAPEUTIC EXERCISES: CPT

## 2018-02-02 ENCOUNTER — HOSPITAL ENCOUNTER (OUTPATIENT)
Dept: PHYSICAL THERAPY | Age: 33
Setting detail: THERAPIES SERIES
Discharge: HOME | End: 2018-02-02
Payer: COMMERCIAL

## 2018-02-02 PROCEDURE — 97110 THERAPEUTIC EXERCISES: CPT

## 2018-02-02 NOTE — DISCHARGE SUMMARY
[x] Mabel Fess        Outpatient Physical                Therapy       [address]       Phone: [phone]       Fax: [phone] [] [facility] at [address] [place] [address]       Phone: [phone]       Fax: [phone] [] [facility]     [address] [facility]     Phone: [phone]     Fax:  [phone]     Physical Therapy Discharge Note    Date: 2018      Patient: Shaina Arreola  : 1985  MRN: [id number]: Sandy Navarro MD  Insurance: [facility] (12 Rx until 18)  Medical Diagnosis: Left knee contusion- S80.02XA              Rehab Codes: M25.562, M25.662, M62.81  Onset date: 17                           Next 's appt. : 18     Visit# / total visits:                   Cancels/No Shows: 0/0  Date of initial visit: 18                Date of final visit: 18      Subjective:  Pain:  [x] Yes  [] No          Location: L knee         Pain Rating: (0-10 scale) 0/10  Pain altered Tx:  [x] No  [] Yes  Action:  Comments: No pain currently, continues to get the popping in her knee. Returns to work next Thursday. Objective:  Test Measurements: Left knee MMT: Quads and Hamstrings 5/5  Function:  Functionally doing well. Pt able to jog on treadmill in the clinic without difficulty, c/o mild discomfort. Assessment:  STG: (to be met in 10 treatments)  1. ? Pain: Decrease pain 2 levels--MET  2. ? ROM: Increase left knee flexion to 140° (equal to the right)--MET  3. ? Strength: Increase quad and hamstring strength to 4+/5, increase left hip strength to 4+/5--MET  4. ? Function: Improve LEFS by 10%--Progress made, improved by 9%   5. Independent with Home Exercise Programs--MET  LTG: (to be met in 18 treatments)  1. Pt will have no greater than 1/10 pain with activity--MET  2. Pt will have 5/5 left knee and hip strength--MET  3.  Pt will be able to

## 2019-12-08 ENCOUNTER — APPOINTMENT (OUTPATIENT)
Dept: GENERAL RADIOLOGY | Age: 34
End: 2019-12-08

## 2019-12-08 ENCOUNTER — HOSPITAL ENCOUNTER (EMERGENCY)
Age: 34
Discharge: HOME | End: 2019-12-09
Attending: EMERGENCY MEDICINE

## 2019-12-08 VITALS
HEIGHT: 66 IN | RESPIRATION RATE: 18 BRPM | SYSTOLIC BLOOD PRESSURE: 139 MMHG | TEMPERATURE: 98.6 F | HEART RATE: 86 BPM | OXYGEN SATURATION: 97 % | BODY MASS INDEX: 24.43 KG/M2 | DIASTOLIC BLOOD PRESSURE: 92 MMHG | WEIGHT: 152 LBS

## 2019-12-08 DIAGNOSIS — M25.562 ACUTE PAIN OF LEFT KNEE: Primary | ICD-10-CM

## 2019-12-08 PROCEDURE — 6370000000 HC RX 637 (ALT 250 FOR IP): Performed by: STUDENT IN AN ORGANIZED HEALTH CARE EDUCATION/TRAINING PROGRAM

## 2019-12-08 PROCEDURE — 96372 THER/PROPH/DIAG INJ SC/IM: CPT

## 2019-12-08 PROCEDURE — 99283 EMERGENCY DEPT VISIT LOW MDM: CPT

## 2019-12-08 PROCEDURE — 6360000002 HC RX W HCPCS: Performed by: STUDENT IN AN ORGANIZED HEALTH CARE EDUCATION/TRAINING PROGRAM

## 2019-12-08 PROCEDURE — 73562 X-RAY EXAM OF KNEE 3: CPT

## 2019-12-08 RX ORDER — IBUPROFEN 600 MG/1
600 TABLET ORAL EVERY 6 HOURS PRN
Qty: 30 TABLET | Refills: 0 | Status: SHIPPED | OUTPATIENT
Start: 2019-12-08 | End: 2021-05-06

## 2019-12-08 RX ORDER — ACETAMINOPHEN 325 MG/1
650 TABLET ORAL EVERY 6 HOURS PRN
Qty: 30 TABLET | Refills: 3 | Status: SHIPPED | OUTPATIENT
Start: 2019-12-08 | End: 2021-05-06

## 2019-12-08 RX ORDER — OXYCODONE HYDROCHLORIDE 5 MG/1
5 TABLET ORAL ONCE
Status: COMPLETED | OUTPATIENT
Start: 2019-12-08 | End: 2019-12-08

## 2019-12-08 RX ORDER — KETOROLAC TROMETHAMINE 15 MG/ML
15 INJECTION, SOLUTION INTRAMUSCULAR; INTRAVENOUS ONCE
Status: COMPLETED | OUTPATIENT
Start: 2019-12-08 | End: 2019-12-08

## 2019-12-08 RX ORDER — BUSPIRONE HYDROCHLORIDE 10 MG/1
10 TABLET ORAL DAILY
Status: ON HOLD | COMMUNITY
End: 2021-08-03

## 2019-12-08 RX ORDER — IBUPROFEN 800 MG/1
800 TABLET ORAL ONCE
Status: COMPLETED | OUTPATIENT
Start: 2019-12-08 | End: 2019-12-08

## 2019-12-08 RX ORDER — ONDANSETRON 4 MG/1
4 TABLET, ORALLY DISINTEGRATING ORAL ONCE
Status: COMPLETED | OUTPATIENT
Start: 2019-12-09 | End: 2019-12-08

## 2019-12-08 RX ADMIN — ONDANSETRON 4 MG: 4 TABLET, ORALLY DISINTEGRATING ORAL at 23:57

## 2019-12-08 RX ADMIN — IBUPROFEN 800 MG: 800 TABLET, FILM COATED ORAL at 22:45

## 2019-12-08 RX ADMIN — KETOROLAC TROMETHAMINE 15 MG: 15 INJECTION, SOLUTION INTRAMUSCULAR; INTRAVENOUS at 22:44

## 2019-12-08 RX ADMIN — OXYCODONE HYDROCHLORIDE 5 MG: 5 TABLET ORAL at 23:45

## 2019-12-08 ASSESSMENT — PAIN SCALES - GENERAL
PAINLEVEL_OUTOF10: 7

## 2019-12-08 ASSESSMENT — PAIN DESCRIPTION - PAIN TYPE: TYPE: ACUTE PAIN

## 2019-12-08 ASSESSMENT — PAIN DESCRIPTION - LOCATION: LOCATION: KNEE

## 2019-12-08 ASSESSMENT — PAIN DESCRIPTION - ORIENTATION: ORIENTATION: LEFT;ANTERIOR

## 2019-12-08 ASSESSMENT — PAIN DESCRIPTION - ONSET: ONSET: ON-GOING

## 2019-12-08 ASSESSMENT — PAIN DESCRIPTION - DESCRIPTORS: DESCRIPTORS: THROBBING;SHARP

## 2019-12-08 ASSESSMENT — PAIN DESCRIPTION - FREQUENCY: FREQUENCY: CONTINUOUS

## 2019-12-09 RX ORDER — OXYCODONE HYDROCHLORIDE 5 MG/1
5 TABLET ORAL EVERY 8 HOURS PRN
Qty: 6 TABLET | Refills: 0 | Status: SHIPPED | OUTPATIENT
Start: 2019-12-09 | End: 2019-12-12

## 2019-12-09 RX ORDER — ONDANSETRON 4 MG/1
4 TABLET, ORALLY DISINTEGRATING ORAL EVERY 8 HOURS PRN
Qty: 6 TABLET | Refills: 0 | Status: SHIPPED | OUTPATIENT
Start: 2019-12-09 | End: 2021-05-05

## 2021-05-05 ENCOUNTER — APPOINTMENT (OUTPATIENT)
Dept: CT IMAGING | Age: 36
End: 2021-05-05
Payer: MEDICARE

## 2021-05-05 ENCOUNTER — HOSPITAL ENCOUNTER (EMERGENCY)
Age: 36
Discharge: HOME | End: 2021-05-05
Attending: EMERGENCY MEDICINE
Payer: MEDICARE

## 2021-05-05 VITALS
BODY MASS INDEX: 23.95 KG/M2 | WEIGHT: 149 LBS | DIASTOLIC BLOOD PRESSURE: 82 MMHG | HEIGHT: 66 IN | OXYGEN SATURATION: 100 % | RESPIRATION RATE: 14 BRPM | HEART RATE: 88 BPM | SYSTOLIC BLOOD PRESSURE: 121 MMHG | TEMPERATURE: 98.5 F

## 2021-05-05 DIAGNOSIS — R10.11 RIGHT UPPER QUADRANT ABDOMINAL PAIN: Primary | ICD-10-CM

## 2021-05-05 LAB
ABSOLUTE EOS #: 0.11 K/UL (ref 0–0.44)
ABSOLUTE IMMATURE GRANULOCYTE: 0 K/UL (ref 0–0.3)
ABSOLUTE LYMPH #: 2.3 K/UL (ref 1.1–3.7)
ABSOLUTE MONO #: 0.44 K/UL (ref 0.1–1.2)
ALBUMIN SERPL-MCNC: 4.2 G/DL (ref 3.5–5.2)
ALBUMIN/GLOBULIN RATIO: ABNORMAL (ref 1–2.5)
ALP BLD-CCNC: 93 U/L (ref 35–104)
ALT SERPL-CCNC: 16 U/L (ref 5–33)
ANION GAP SERPL CALCULATED.3IONS-SCNC: 11 MMOL/L (ref 9–17)
AST SERPL-CCNC: 22 U/L
BASOPHILS # BLD: 1 % (ref 0–2)
BASOPHILS ABSOLUTE: 0.04 K/UL (ref 0–0.2)
BILIRUB SERPL-MCNC: 0.2 MG/DL (ref 0.3–1.2)
BILIRUBIN DIRECT: <0.08 MG/DL
BILIRUBIN, INDIRECT: ABNORMAL MG/DL (ref 0–1)
BUN BLDV-MCNC: 10 MG/DL (ref 6–20)
BUN/CREAT BLD: 11 (ref 9–20)
CALCIUM SERPL-MCNC: 9.2 MG/DL (ref 8.6–10.4)
CHLORIDE BLD-SCNC: 102 MMOL/L (ref 98–107)
CO2: 25 MMOL/L (ref 20–31)
CREAT SERPL-MCNC: 0.88 MG/DL (ref 0.5–0.9)
DIFFERENTIAL TYPE: ABNORMAL
EOSINOPHILS RELATIVE PERCENT: 3 % (ref 1–4)
GFR AFRICAN AMERICAN: >60 ML/MIN
GFR NON-AFRICAN AMERICAN: >60 ML/MIN
GFR SERPL CREATININE-BSD FRML MDRD: ABNORMAL ML/MIN/{1.73_M2}
GFR SERPL CREATININE-BSD FRML MDRD: ABNORMAL ML/MIN/{1.73_M2}
GLOBULIN: ABNORMAL G/DL (ref 1.5–3.8)
GLUCOSE BLD-MCNC: 104 MG/DL (ref 70–99)
HCT VFR BLD CALC: 37.2 % (ref 36.3–47.1)
HEMOGLOBIN: 11.8 G/DL (ref 11.9–15.1)
IMMATURE GRANULOCYTES: 0 %
LACTIC ACID, SEPSIS WHOLE BLOOD: NORMAL MMOL/L (ref 0.5–1.9)
LACTIC ACID, SEPSIS: 1.2 MMOL/L (ref 0.5–1.9)
LIPASE: 51 U/L (ref 13–60)
LYMPHOCYTES # BLD: 62 % (ref 24–43)
MCH RBC QN AUTO: 27.6 PG (ref 25.2–33.5)
MCHC RBC AUTO-ENTMCNC: 31.7 G/DL (ref 28.4–34.8)
MCV RBC AUTO: 86.9 FL (ref 82.6–102.9)
MONOCYTES # BLD: 12 % (ref 3–12)
NRBC AUTOMATED: 0 PER 100 WBC
PDW BLD-RTO: 11.8 % (ref 11.8–14.4)
PLATELET # BLD: 215 K/UL (ref 138–453)
PLATELET ESTIMATE: ABNORMAL
PMV BLD AUTO: 10.4 FL (ref 8.1–13.5)
POTASSIUM SERPL-SCNC: 3.6 MMOL/L (ref 3.7–5.3)
RBC # BLD: 4.28 M/UL (ref 3.95–5.11)
RBC # BLD: ABNORMAL 10*6/UL
SEG NEUTROPHILS: 22 % (ref 36–65)
SEGMENTED NEUTROPHILS ABSOLUTE COUNT: 0.81 K/UL (ref 1.5–8.1)
SODIUM BLD-SCNC: 138 MMOL/L (ref 135–144)
TOTAL PROTEIN: 7.4 G/DL (ref 6.4–8.3)
WBC # BLD: 3.7 K/UL (ref 3.5–11.3)
WBC # BLD: ABNORMAL 10*3/UL

## 2021-05-05 PROCEDURE — 81003 URINALYSIS AUTO W/O SCOPE: CPT

## 2021-05-05 PROCEDURE — 83605 ASSAY OF LACTIC ACID: CPT

## 2021-05-05 PROCEDURE — 80076 HEPATIC FUNCTION PANEL: CPT

## 2021-05-05 PROCEDURE — 85025 COMPLETE CBC W/AUTO DIFF WBC: CPT

## 2021-05-05 PROCEDURE — 81025 URINE PREGNANCY TEST: CPT

## 2021-05-05 PROCEDURE — 96375 TX/PRO/DX INJ NEW DRUG ADDON: CPT

## 2021-05-05 PROCEDURE — C9113 INJ PANTOPRAZOLE SODIUM, VIA: HCPCS | Performed by: NURSE PRACTITIONER

## 2021-05-05 PROCEDURE — 99282 EMERGENCY DEPT VISIT SF MDM: CPT

## 2021-05-05 PROCEDURE — 80048 BASIC METABOLIC PNL TOTAL CA: CPT

## 2021-05-05 PROCEDURE — 74177 CT ABD & PELVIS W/CONTRAST: CPT

## 2021-05-05 PROCEDURE — 2580000003 HC RX 258: Performed by: NURSE PRACTITIONER

## 2021-05-05 PROCEDURE — 6360000004 HC RX CONTRAST MEDICATION: Performed by: NURSE PRACTITIONER

## 2021-05-05 PROCEDURE — 6360000002 HC RX W HCPCS: Performed by: NURSE PRACTITIONER

## 2021-05-05 PROCEDURE — 83690 ASSAY OF LIPASE: CPT

## 2021-05-05 PROCEDURE — 96374 THER/PROPH/DIAG INJ IV PUSH: CPT

## 2021-05-05 RX ORDER — PANTOPRAZOLE SODIUM 20 MG/1
20 TABLET, DELAYED RELEASE ORAL DAILY
Qty: 14 TABLET | Refills: 0 | Status: ON HOLD | OUTPATIENT
Start: 2021-05-05 | End: 2021-05-07 | Stop reason: HOSPADM

## 2021-05-05 RX ORDER — ONDANSETRON 4 MG/1
4 TABLET, ORALLY DISINTEGRATING ORAL EVERY 8 HOURS PRN
Qty: 20 TABLET | Refills: 0 | Status: SHIPPED | OUTPATIENT
Start: 2021-05-05 | End: 2021-05-06

## 2021-05-05 RX ORDER — SODIUM CHLORIDE 9 MG/ML
INJECTION, SOLUTION INTRAVENOUS CONTINUOUS
Status: DISCONTINUED | OUTPATIENT
Start: 2021-05-05 | End: 2021-05-06 | Stop reason: HOSPADM

## 2021-05-05 RX ORDER — 0.9 % SODIUM CHLORIDE 0.9 %
80 INTRAVENOUS SOLUTION INTRAVENOUS ONCE
Status: COMPLETED | OUTPATIENT
Start: 2021-05-05 | End: 2021-05-05

## 2021-05-05 RX ORDER — PANTOPRAZOLE SODIUM 40 MG/10ML
40 INJECTION, POWDER, LYOPHILIZED, FOR SOLUTION INTRAVENOUS ONCE
Status: COMPLETED | OUTPATIENT
Start: 2021-05-05 | End: 2021-05-05

## 2021-05-05 RX ORDER — DICYCLOMINE HYDROCHLORIDE 10 MG/1
10 CAPSULE ORAL
Qty: 20 CAPSULE | Refills: 3 | Status: SHIPPED | OUTPATIENT
Start: 2021-05-05 | End: 2021-05-06

## 2021-05-05 RX ORDER — KETOROLAC TROMETHAMINE 15 MG/ML
15 INJECTION, SOLUTION INTRAMUSCULAR; INTRAVENOUS ONCE
Status: COMPLETED | OUTPATIENT
Start: 2021-05-05 | End: 2021-05-05

## 2021-05-05 RX ORDER — SODIUM CHLORIDE 0.9 % (FLUSH) 0.9 %
10 SYRINGE (ML) INJECTION PRN
Status: DISCONTINUED | OUTPATIENT
Start: 2021-05-05 | End: 2021-05-06 | Stop reason: HOSPADM

## 2021-05-05 RX ORDER — SODIUM CHLORIDE 9 MG/ML
10 INJECTION INTRAVENOUS ONCE
Status: COMPLETED | OUTPATIENT
Start: 2021-05-05 | End: 2021-05-05

## 2021-05-05 RX ORDER — ONDANSETRON 2 MG/ML
4 INJECTION INTRAMUSCULAR; INTRAVENOUS ONCE
Status: COMPLETED | OUTPATIENT
Start: 2021-05-05 | End: 2021-05-05

## 2021-05-05 RX ADMIN — SODIUM CHLORIDE: 9 INJECTION, SOLUTION INTRAVENOUS at 21:06

## 2021-05-05 RX ADMIN — IOPAMIDOL 75 ML: 755 INJECTION, SOLUTION INTRAVENOUS at 21:33

## 2021-05-05 RX ADMIN — KETOROLAC TROMETHAMINE 15 MG: 15 INJECTION, SOLUTION INTRAMUSCULAR; INTRAVENOUS at 22:25

## 2021-05-05 RX ADMIN — SODIUM CHLORIDE, PRESERVATIVE FREE 10 ML: 5 INJECTION INTRAVENOUS at 21:33

## 2021-05-05 RX ADMIN — SODIUM CHLORIDE 80 ML: 9 INJECTION, SOLUTION INTRAVENOUS at 21:33

## 2021-05-05 RX ADMIN — ONDANSETRON 4 MG: 2 INJECTION INTRAMUSCULAR; INTRAVENOUS at 21:20

## 2021-05-05 RX ADMIN — PANTOPRAZOLE SODIUM 40 MG: 40 INJECTION, POWDER, FOR SOLUTION INTRAVENOUS at 22:25

## 2021-05-05 RX ADMIN — SODIUM CHLORIDE, PRESERVATIVE FREE 10 ML: 5 INJECTION INTRAVENOUS at 22:25

## 2021-05-05 ASSESSMENT — ENCOUNTER SYMPTOMS
VOMITING: 0
ABDOMINAL PAIN: 1
SHORTNESS OF BREATH: 0
BACK PAIN: 0
COUGH: 0
SORE THROAT: 0
DIARRHEA: 0
NAUSEA: 1
COLOR CHANGE: 0

## 2021-05-05 ASSESSMENT — PAIN SCALES - GENERAL: PAINLEVEL_OUTOF10: 8

## 2021-05-05 ASSESSMENT — PAIN DESCRIPTION - FREQUENCY: FREQUENCY: CONTINUOUS

## 2021-05-05 ASSESSMENT — PAIN DESCRIPTION - LOCATION: LOCATION: ABDOMEN;RIB CAGE

## 2021-05-05 ASSESSMENT — PAIN DESCRIPTION - ORIENTATION: ORIENTATION: RIGHT

## 2021-05-06 ENCOUNTER — HOSPITAL ENCOUNTER (OUTPATIENT)
Age: 36
Setting detail: OBSERVATION
Discharge: HOME | End: 2021-05-07
Attending: EMERGENCY MEDICINE | Admitting: INTERNAL MEDICINE
Payer: MEDICARE

## 2021-05-06 DIAGNOSIS — R10.11 ABDOMINAL PAIN, RIGHT UPPER QUADRANT: Primary | ICD-10-CM

## 2021-05-06 LAB — HCG, PREGNANCY URINE (POC): NEGATIVE

## 2021-05-06 PROCEDURE — 99284 EMERGENCY DEPT VISIT MOD MDM: CPT

## 2021-05-06 RX ORDER — 0.9 % SODIUM CHLORIDE 0.9 %
1000 INTRAVENOUS SOLUTION INTRAVENOUS ONCE
Status: COMPLETED | OUTPATIENT
Start: 2021-05-07 | End: 2021-05-07

## 2021-05-06 ASSESSMENT — PAIN DESCRIPTION - LOCATION: LOCATION: ABDOMEN

## 2021-05-06 ASSESSMENT — PAIN DESCRIPTION - PAIN TYPE: TYPE: ACUTE PAIN

## 2021-05-06 ASSESSMENT — PAIN SCALES - GENERAL: PAINLEVEL_OUTOF10: 7

## 2021-05-06 NOTE — ED PROVIDER NOTES
eMERGENCY dEPARTMENT eNCOUnter   Independent Attestation     Pt Name: Adelia Milligan  MRN: [id number]  [place] 1985  Date of evaluation: 5/5/21     Adelia Milligan is a 38 y.o. female with CC: Abdominal Pain (right), Rib Pain (right), and Fever      Based on the medical record the care appears appropriate. I was personally available for consultation in the Emergency Department.     Sandra Kan MD  Attending Emergency Physician                    Sandra Kan MD  05/05/21 2634       Sandra Kan MD  05/10/21 2926

## 2021-05-06 NOTE — ED PROVIDER NOTES
[address] ED  eMERGENCY dEPARTMENT eNCOUnter      Pt Name: Avril Gonzalez  MRN: [id number]  [place] 1985  Date of evaluation: 5/5/2021  Provider: ALEX Staley CNP    CHIEF COMPLAINT       Chief Complaint   Patient presents with    Abdominal Pain     right    Rib Pain     right    Fever         HISTORY OF PRESENT ILLNESS  (Location/Symptom, Timing/Onset, Context/Setting, Quality, Duration, Modifying Factors, Severity.)   Avril Gonzalez is a 38 y.o. female who presents to the emergency department via private auto for right abd pain, nausea, fever. Onset was earlier today. Denies injury, diarrhea, urinary symptoms, cough. Rates her pain 8/10 at this time. Nursing Notes were reviewed.     ALLERGIES     Latex and Reglan [metoclopramide]    CURRENT MEDICATIONS       Discharge Medication List as of 5/5/2021 10:54 PM      CONTINUE these medications which have NOT CHANGED    Details   busPIRone (BUSPAR) 10 MG tablet Take 20 mg by mouth dailyHistorical Med      acetaminophen (TYLENOL) 325 MG tablet Take 2 tablets by mouth every 6 hours as needed for Pain, Disp-30 tablet, R-3Print      ibuprofen (ADVIL;MOTRIN) 600 MG tablet Take 1 tablet by mouth every 6 hours as needed for Pain, Disp-30 tablet, R-0Print      ESTROGENS CONJ SYNTHETIC A PO Take by mouthHistorical Med             PAST MEDICAL HISTORY         Diagnosis Date    Anxiety     Nausea        SURGICAL HISTORY           Procedure Laterality Date    COSMETIC SURGERY      OVARY REMOVAL Left     TUBAL LIGATION Bilateral 20134    UPPER GASTROINTESTINAL ENDOSCOPY  12/2/15         FAMILY HISTORY           Problem Relation Age of Onset    Diabetes Mother     Cancer Paternal Uncle         stomach    Cancer Paternal Grandmother         breast, stomach    Diabetes Paternal Grandmother     High Blood Pressure Paternal Grandfather      Family Status   Relation Name Status    Mother  Alive    Father  Alive    [facility] Alive    PGM  Alive    PGF  Alive        SOCIAL HISTORY      reports that she has never smoked. She has never used smokeless tobacco. She reports that she does not drink alcohol or use drugs. REVIEW OF SYSTEMS    (2-9 systems for level 4, 10 or more for level 5)     Review of Systems   Constitutional: Positive for chills and fever. Negative for diaphoresis and fatigue. HENT: Negative for congestion and sore throat. Respiratory: Negative for cough and shortness of breath. Cardiovascular: Negative for chest pain and palpitations. Gastrointestinal: Positive for abdominal pain and nausea. Negative for diarrhea and vomiting. Genitourinary: Negative for dysuria, flank pain, frequency, hematuria and urgency. Musculoskeletal: Positive for arthralgias and myalgias. Negative for back pain, neck pain and neck stiffness. Skin: Negative for color change and rash. Neurological: Negative for dizziness, weakness, light-headedness and headaches. Except as noted above the remainder of the review of systems was reviewed and negative. PHYSICAL EXAM    (up to 7 for level 4, 8 or more for level 5)     ED Triage Vitals [05/05/21 2008]   BP Temp Temp Source Pulse Resp SpO2 Height Weight   121/82 98.5 °F (36.9 °C) Oral 88 14 100 % 5' 6\" (1.676 m) 149 lb (67.6 kg)     Physical Exam  Vitals signs reviewed. Constitutional:       General: She is not in acute distress. Appearance: She is well-developed. She is not diaphoretic. Eyes:      General: No scleral icterus. Conjunctiva/sclera: Conjunctivae normal.   Neck:      Vascular: No JVD. Cardiovascular:      Rate and Rhythm: Normal rate. Pulmonary:      Effort: Pulmonary effort is normal. No respiratory distress. Breath sounds: No stridor. Abdominal:      General: There is no distension. Palpations: Abdomen is soft. Tenderness: There is abdominal tenderness in the right upper quadrant and right lower quadrant. There is no guarding. Musculoskeletal:      Comments: Moves extremities   Skin:     General: Skin is warm and dry. Findings: No rash. Neurological:      Mental Status: She is alert and oriented to person, place, and time. Psychiatric:         Behavior: Behavior normal.         DIAGNOSTIC RESULTS     RADIOLOGY:   Non-plain film images such as CT, Ultrasound and MRI are read by the radiologist. Plain radiographic images are visualized and preliminarily interpreted by the emergency physician with the below findings:    Interpretation per the Radiologist below, if available at the time of this note:    Ct Abdomen Pelvis W Iv Contrast Additional Contrast? None    Result Date: 5/5/2021  EXAMINATION: CT OF THE ABDOMEN AND PELVIS WITH CONTRAST 5/5/2021 9:18 pm TECHNIQUE: CT of the abdomen and pelvis was performed with the administration of intravenous contrast. Multiplanar reformatted images are provided for review. Dose modulation, iterative reconstruction, and/or weight based adjustment of the mA/kV was utilized to reduce the radiation dose to as low as reasonably achievable. COMPARISON: 07/20/2016 HISTORY: ORDERING SYSTEM PROVIDED HISTORY: Rt abd pain TECHNOLOGIST PROVIDED HISTORY: Rt abd pain Decision Support Exception - unselect if not a suspected or confirmed emergency medical condition->Emergency Medical Condition (MA) Reason for Exam: Right side abdomen pain and nausea x 1 day Acuity: Acute Type of Exam: Initial Relevant Medical/Surgical History: Hx of tubal ligation, hysterectomy FINDINGS: Lower Chest: The lung bases are clear and the heart size is normal. Organs: The liver, spleen, pancreas, adrenal glands and kidneys are normal. The gallbladder is partially contracted. There are no calcified gallstones. No pericholecystic fluid. GI/Bowel: The appendix is normal.  There is some high attenuation debris within the normal caliber appendix. Mild sigmoid colon diverticulosis with no evidence of diverticulitis.   Unopacified bowel loops are otherwise unremarkable. No bowel obstruction. Pelvis: The uterus is surgically absent. The right ovary is not with certainty visualized and may be surgically absent. Previously noted small fatty lesion in the right ovary is not visualized. Urinary bladder is unremarkable. Peritoneum/Retroperitoneum: There is no adenopathy, free air or free fluid. Bones/Soft Tissues: No acute bone or soft tissue abnormality. No acute finding in the abdomen or pelvis. Specifically, the appendix is normal. The uterus is surgically absent. The right ovary is not visualized as a discrete structure and may be surgically absent. LABS:  Labs Reviewed   BASIC METABOLIC PANEL - Abnormal; Notable for the following components:       Result Value    Glucose 104 (*)     Potassium 3.6 (*)     All other components within normal limits   CBC WITH AUTO DIFFERENTIAL - Abnormal; Notable for the following components:    Hemoglobin 11.8 (*)     Seg Neutrophils 22 (*)     Lymphocytes 62 (*)     Segs Absolute 0.81 (*)     All other components within normal limits   HEPATIC FUNCTION PANEL - Abnormal; Notable for the following components: Total Bilirubin 0.20 (*)     All other components within normal limits   LIPASE   LACTATE, SEPSIS   POCT URINE PREGNANCY       All other labs were within normal range or not returned as of this dictation.     EMERGENCY DEPARTMENT COURSE and DIFFERENTIAL DIAGNOSIS/MDM:   Vitals:    Vitals:    05/05/21 2008   BP: 121/82   Pulse: 88   Resp: 14   Temp: 98.5 °F (36.9 °C)   TempSrc: Oral   SpO2: 100%   Weight: 149 lb (67.6 kg)   Height: 5' 6\" (1.676 m)       MEDICATIONS GIVEN IN THE ED:  Medications   ondansetron (ZOFRAN) injection 4 mg (4 mg Intravenous Given 5/5/21 2120)   0.9 % sodium chloride bolus (0 mLs Intravenous Stopped 5/5/21 2137)   iopamidol (ISOVUE-370) 76 % injection 75 mL (75 mLs Intravenous Given 5/5/21 2133)   ketorolac (TORADOL) injection 15 mg (15 mg Intravenous Given 5/5/21 2225)   pantoprazole (PROTONIX) injection 40 mg (40 mg Intravenous Given 5/5/21 2225)     And   sodium chloride (PF) 0.9 % injection 10 mL (10 mLs Intravenous Given 5/5/21 2225)       CLINICAL DECISION MAKING:  The patient presented alert with a nontoxic appearance and was seen in conjunction with Dr. Rita Lester. Imaging was negative for acute findings. Laboratory studies were unremarkable. She was advised to follow up with her pcp for further evaluation, treatment, outpatient GB ultrasound. Return to ED if condition worsens. Prescriptions were written for Zofran, bentyl, and Protonix. FINAL IMPRESSION      1.  Right upper quadrant abdominal pain            Problem List  Patient Active Problem List   Diagnosis Code    High-risk pregnancy O09.90    Epigastric pain R10.13    Nausea R11.0    Bloating R14.0    Generalized abdominal pain R10.84         DISPOSITION/PLAN   DISPOSITION  DISCHARGE      PATIENT REFERRED TO:   Maria Thompson MD  [address] [place] [address]  [phone]    Schedule an appointment as soon as possible for a visit       [facility] ED  [address] [facility]  [phone]    If symptoms worsen, As needed      DISCHARGE MEDICATIONS:     Discharge Medication List as of 5/5/2021 10:54 PM      START taking these medications    Details   dicyclomine (BENTYL) 10 MG capsule Take 1 capsule by mouth 4 times daily (before meals and nightly), Disp-20 capsule, R-3Print      pantoprazole (PROTONIX) 20 MG tablet Take 1 tablet by mouth daily, Disp-14 tablet, R-0Print                 (Please note that portions of this note were completed with a voice recognition program.  Efforts were made to edit the dictations but occasionally words are mis-transcribed.)    ALEX Loja - CNP     ALEX Loja - DAMARI  05/06/21 2007

## 2021-05-07 ENCOUNTER — ANESTHESIA (OUTPATIENT)
Dept: OPERATING ROOM | Age: 36
End: 2021-05-07
Payer: MEDICARE

## 2021-05-07 ENCOUNTER — ANESTHESIA EVENT (OUTPATIENT)
Dept: OPERATING ROOM | Age: 36
End: 2021-05-07
Payer: MEDICARE

## 2021-05-07 ENCOUNTER — APPOINTMENT (OUTPATIENT)
Dept: NUCLEAR MEDICINE | Age: 36
End: 2021-05-07
Payer: MEDICARE

## 2021-05-07 ENCOUNTER — APPOINTMENT (OUTPATIENT)
Dept: CT IMAGING | Age: 36
End: 2021-05-07
Payer: MEDICARE

## 2021-05-07 VITALS
HEART RATE: 77 BPM | BODY MASS INDEX: 24.99 KG/M2 | HEIGHT: 66 IN | OXYGEN SATURATION: 98 % | WEIGHT: 155.5 LBS | DIASTOLIC BLOOD PRESSURE: 75 MMHG | RESPIRATION RATE: 16 BRPM | TEMPERATURE: 97.9 F | SYSTOLIC BLOOD PRESSURE: 115 MMHG

## 2021-05-07 VITALS
DIASTOLIC BLOOD PRESSURE: 102 MMHG | RESPIRATION RATE: 15 BRPM | OXYGEN SATURATION: 100 % | SYSTOLIC BLOOD PRESSURE: 154 MMHG

## 2021-05-07 PROBLEM — R10.11 RIGHT UPPER QUADRANT PAIN: Status: ACTIVE | Noted: 2021-05-07

## 2021-05-07 PROBLEM — R10.9 ABDOMINAL PAIN: Status: ACTIVE | Noted: 2021-05-07

## 2021-05-07 LAB
ABSOLUTE EOS #: 0.16 K/UL (ref 0–0.44)
ABSOLUTE IMMATURE GRANULOCYTE: 0.01 K/UL (ref 0–0.3)
ABSOLUTE LYMPH #: 2.62 K/UL (ref 1.1–3.7)
ABSOLUTE MONO #: 0.38 K/UL (ref 0.1–1.2)
ALBUMIN SERPL-MCNC: 4.2 G/DL (ref 3.5–5.2)
ALBUMIN/GLOBULIN RATIO: ABNORMAL (ref 1–2.5)
ALP BLD-CCNC: 91 U/L (ref 35–104)
ALT SERPL-CCNC: 17 U/L (ref 5–33)
ANION GAP SERPL CALCULATED.3IONS-SCNC: 12 MMOL/L (ref 9–17)
ANION GAP SERPL CALCULATED.3IONS-SCNC: 9 MMOL/L (ref 9–17)
AST SERPL-CCNC: 23 U/L
BASOPHILS # BLD: 1 % (ref 0–2)
BASOPHILS ABSOLUTE: 0.04 K/UL (ref 0–0.2)
BILIRUB SERPL-MCNC: 0.23 MG/DL (ref 0.3–1.2)
BILIRUBIN URINE: NEGATIVE
BUN BLDV-MCNC: 10 MG/DL (ref 6–20)
BUN BLDV-MCNC: 12 MG/DL (ref 6–20)
BUN/CREAT BLD: 13 (ref 9–20)
BUN/CREAT BLD: 16 (ref 9–20)
C-REACTIVE PROTEIN: 4.5 MG/L (ref 0–5)
CALCIUM SERPL-MCNC: 8.3 MG/DL (ref 8.6–10.4)
CALCIUM SERPL-MCNC: 9.5 MG/DL (ref 8.6–10.4)
CHLORIDE BLD-SCNC: 102 MMOL/L (ref 98–107)
CHLORIDE BLD-SCNC: 107 MMOL/L (ref 98–107)
CO2: 22 MMOL/L (ref 20–31)
CO2: 22 MMOL/L (ref 20–31)
COLOR: YELLOW
COMMENT UA: ABNORMAL
CREAT SERPL-MCNC: 0.74 MG/DL (ref 0.5–0.9)
CREAT SERPL-MCNC: 0.76 MG/DL (ref 0.5–0.9)
DIFFERENTIAL TYPE: ABNORMAL
EOSINOPHILS RELATIVE PERCENT: 4 % (ref 1–4)
GFR AFRICAN AMERICAN: >60 ML/MIN
GFR AFRICAN AMERICAN: >60 ML/MIN
GFR NON-AFRICAN AMERICAN: >60 ML/MIN
GFR NON-AFRICAN AMERICAN: >60 ML/MIN
GFR SERPL CREATININE-BSD FRML MDRD: ABNORMAL ML/MIN/{1.73_M2}
GLUCOSE BLD-MCNC: 103 MG/DL (ref 70–99)
GLUCOSE BLD-MCNC: 94 MG/DL (ref 70–99)
GLUCOSE URINE: NEGATIVE
HCG QUALITATIVE: NEGATIVE
HCT VFR BLD CALC: 32.1 % (ref 36.3–47.1)
HCT VFR BLD CALC: 36.5 % (ref 36.3–47.1)
HEMOGLOBIN: 10.1 G/DL (ref 11.9–15.1)
HEMOGLOBIN: 11.6 G/DL (ref 11.9–15.1)
IMMATURE GRANULOCYTES: 0 %
KETONES, URINE: NEGATIVE
LACTIC ACID: 1.2 MMOL/L (ref 0.5–2.2)
LEUKOCYTE ESTERASE, URINE: NEGATIVE
LIPASE: 47 U/L (ref 13–60)
LYMPHOCYTES # BLD: 60 % (ref 24–43)
MCH RBC QN AUTO: 27.3 PG (ref 25.2–33.5)
MCH RBC QN AUTO: 27.9 PG (ref 25.2–33.5)
MCHC RBC AUTO-ENTMCNC: 31.5 G/DL (ref 28.4–34.8)
MCHC RBC AUTO-ENTMCNC: 31.8 G/DL (ref 28.4–34.8)
MCV RBC AUTO: 86.8 FL (ref 82.6–102.9)
MCV RBC AUTO: 87.7 FL (ref 82.6–102.9)
MONOCYTES # BLD: 9 % (ref 3–12)
NITRITE, URINE: NEGATIVE
NRBC AUTOMATED: 0 PER 100 WBC
NRBC AUTOMATED: 0 PER 100 WBC
PDW BLD-RTO: 11.8 % (ref 11.8–14.4)
PDW BLD-RTO: 11.9 % (ref 11.8–14.4)
PH UA: 6 (ref 5–8)
PLATELET # BLD: 181 K/UL (ref 138–453)
PLATELET # BLD: 210 K/UL (ref 138–453)
PLATELET ESTIMATE: ABNORMAL
PMV BLD AUTO: 10.3 FL (ref 8.1–13.5)
PMV BLD AUTO: 9.8 FL (ref 8.1–13.5)
POTASSIUM SERPL-SCNC: 3.8 MMOL/L (ref 3.7–5.3)
POTASSIUM SERPL-SCNC: 4.1 MMOL/L (ref 3.7–5.3)
PROTEIN UA: NEGATIVE
RBC # BLD: 3.7 M/UL (ref 3.95–5.11)
RBC # BLD: 4.16 M/UL (ref 3.95–5.11)
RBC # BLD: ABNORMAL 10*6/UL
SARS-COV-2, RAPID: NOT DETECTED
SEDIMENTATION RATE, ERYTHROCYTE: 12 MM (ref 0–20)
SEG NEUTROPHILS: 26 % (ref 36–65)
SEGMENTED NEUTROPHILS ABSOLUTE COUNT: 1.15 K/UL (ref 1.5–8.1)
SODIUM BLD-SCNC: 136 MMOL/L (ref 135–144)
SODIUM BLD-SCNC: 138 MMOL/L (ref 135–144)
SPECIFIC GRAVITY UA: 1.04 (ref 1–1.03)
SPECIMEN DESCRIPTION: NORMAL
TOTAL PROTEIN: 7.4 G/DL (ref 6.4–8.3)
TURBIDITY: CLEAR
URINE HGB: NEGATIVE
UROBILINOGEN, URINE: NORMAL
WBC # BLD: 3.5 K/UL (ref 3.5–11.3)
WBC # BLD: 4.4 K/UL (ref 3.5–11.3)
WBC # BLD: ABNORMAL 10*3/UL

## 2021-05-07 PROCEDURE — 7100000001 HC PACU RECOVERY - ADDTL 15 MIN: Performed by: INTERNAL MEDICINE

## 2021-05-07 PROCEDURE — 87040 BLOOD CULTURE FOR BACTERIA: CPT

## 2021-05-07 PROCEDURE — 6360000004 HC RX CONTRAST MEDICATION: Performed by: INTERNAL MEDICINE

## 2021-05-07 PROCEDURE — 78227 HEPATOBIL SYST IMAGE W/DRUG: CPT

## 2021-05-07 PROCEDURE — 85025 COMPLETE CBC W/AUTO DIFF WBC: CPT

## 2021-05-07 PROCEDURE — 96365 THER/PROPH/DIAG IV INF INIT: CPT

## 2021-05-07 PROCEDURE — 3430000000 HC RX DIAGNOSTIC RADIOPHARMACEUTICAL: Performed by: INTERNAL MEDICINE

## 2021-05-07 PROCEDURE — 83605 ASSAY OF LACTIC ACID: CPT

## 2021-05-07 PROCEDURE — C9113 INJ PANTOPRAZOLE SODIUM, VIA: HCPCS | Performed by: INTERNAL MEDICINE

## 2021-05-07 PROCEDURE — 6360000002 HC RX W HCPCS: Performed by: INTERNAL MEDICINE

## 2021-05-07 PROCEDURE — 96375 TX/PRO/DX INJ NEW DRUG ADDON: CPT

## 2021-05-07 PROCEDURE — 2580000003 HC RX 258: Performed by: EMERGENCY MEDICINE

## 2021-05-07 PROCEDURE — 2580000003 HC RX 258: Performed by: INTERNAL MEDICINE

## 2021-05-07 PROCEDURE — 6360000004 HC RX CONTRAST MEDICATION: Performed by: EMERGENCY MEDICINE

## 2021-05-07 PROCEDURE — 2709999900 HC NON-CHARGEABLE SUPPLY: Performed by: INTERNAL MEDICINE

## 2021-05-07 PROCEDURE — 3609012400 HC EGD TRANSORAL BIOPSY SINGLE/MULTIPLE: Performed by: INTERNAL MEDICINE

## 2021-05-07 PROCEDURE — 6360000002 HC RX W HCPCS: Performed by: NURSE ANESTHETIST, CERTIFIED REGISTERED

## 2021-05-07 PROCEDURE — G0378 HOSPITAL OBSERVATION PER HR: HCPCS

## 2021-05-07 PROCEDURE — A9537 TC99M MEBROFENIN: HCPCS | Performed by: INTERNAL MEDICINE

## 2021-05-07 PROCEDURE — 85652 RBC SED RATE AUTOMATED: CPT

## 2021-05-07 PROCEDURE — 6370000000 HC RX 637 (ALT 250 FOR IP): Performed by: INTERNAL MEDICINE

## 2021-05-07 PROCEDURE — 80048 BASIC METABOLIC PNL TOTAL CA: CPT

## 2021-05-07 PROCEDURE — 74177 CT ABD & PELVIS W/CONTRAST: CPT

## 2021-05-07 PROCEDURE — 2580000003 HC RX 258: Performed by: NURSE ANESTHETIST, CERTIFIED REGISTERED

## 2021-05-07 PROCEDURE — 83690 ASSAY OF LIPASE: CPT

## 2021-05-07 PROCEDURE — 87635 SARS-COV-2 COVID-19 AMP PRB: CPT

## 2021-05-07 PROCEDURE — 2500000003 HC RX 250 WO HCPCS: Performed by: NURSE ANESTHETIST, CERTIFIED REGISTERED

## 2021-05-07 PROCEDURE — 85027 COMPLETE CBC AUTOMATED: CPT

## 2021-05-07 PROCEDURE — 84703 CHORIONIC GONADOTROPIN ASSAY: CPT

## 2021-05-07 PROCEDURE — 99254 IP/OBS CNSLTJ NEW/EST MOD 60: CPT | Performed by: INTERNAL MEDICINE

## 2021-05-07 PROCEDURE — 6370000000 HC RX 637 (ALT 250 FOR IP): Performed by: ANESTHESIOLOGY

## 2021-05-07 PROCEDURE — 80053 COMPREHEN METABOLIC PANEL: CPT

## 2021-05-07 PROCEDURE — 6360000002 HC RX W HCPCS: Performed by: EMERGENCY MEDICINE

## 2021-05-07 PROCEDURE — 3700000000 HC ANESTHESIA ATTENDED CARE: Performed by: INTERNAL MEDICINE

## 2021-05-07 PROCEDURE — 36415 COLL VENOUS BLD VENIPUNCTURE: CPT

## 2021-05-07 PROCEDURE — 81003 URINALYSIS AUTO W/O SCOPE: CPT

## 2021-05-07 PROCEDURE — 43239 EGD BIOPSY SINGLE/MULTIPLE: CPT | Performed by: INTERNAL MEDICINE

## 2021-05-07 PROCEDURE — 88305 TISSUE EXAM BY PATHOLOGIST: CPT

## 2021-05-07 PROCEDURE — 86140 C-REACTIVE PROTEIN: CPT

## 2021-05-07 PROCEDURE — 7100000000 HC PACU RECOVERY - FIRST 15 MIN: Performed by: INTERNAL MEDICINE

## 2021-05-07 RX ORDER — SODIUM CHLORIDE 9 MG/ML
INJECTION, SOLUTION INTRAVENOUS CONTINUOUS
Status: DISCONTINUED | OUTPATIENT
Start: 2021-05-07 | End: 2021-05-07 | Stop reason: HOSPADM

## 2021-05-07 RX ORDER — PROPOFOL 10 MG/ML
INJECTION, EMULSION INTRAVENOUS PRN
Status: DISCONTINUED | OUTPATIENT
Start: 2021-05-07 | End: 2021-05-07 | Stop reason: SDUPTHER

## 2021-05-07 RX ORDER — ACETAMINOPHEN 325 MG/1
650 TABLET ORAL EVERY 4 HOURS PRN
Status: DISCONTINUED | OUTPATIENT
Start: 2021-05-07 | End: 2021-05-07 | Stop reason: HOSPADM

## 2021-05-07 RX ORDER — MORPHINE SULFATE 4 MG/ML
4 INJECTION, SOLUTION INTRAMUSCULAR; INTRAVENOUS ONCE
Status: COMPLETED | OUTPATIENT
Start: 2021-05-07 | End: 2021-05-07

## 2021-05-07 RX ORDER — ONDANSETRON 2 MG/ML
4 INJECTION INTRAMUSCULAR; INTRAVENOUS ONCE
Status: COMPLETED | OUTPATIENT
Start: 2021-05-07 | End: 2021-05-07

## 2021-05-07 RX ORDER — PANTOPRAZOLE SODIUM 40 MG/10ML
40 INJECTION, POWDER, LYOPHILIZED, FOR SOLUTION INTRAVENOUS DAILY
Status: DISCONTINUED | OUTPATIENT
Start: 2021-05-07 | End: 2021-05-07 | Stop reason: HOSPADM

## 2021-05-07 RX ORDER — SODIUM CHLORIDE 0.9 % (FLUSH) 0.9 %
5-40 SYRINGE (ML) INJECTION EVERY 12 HOURS SCHEDULED
Status: DISCONTINUED | OUTPATIENT
Start: 2021-05-07 | End: 2021-05-07 | Stop reason: HOSPADM

## 2021-05-07 RX ORDER — SODIUM CHLORIDE 0.9 % (FLUSH) 0.9 %
10 SYRINGE (ML) INJECTION ONCE
Status: COMPLETED | OUTPATIENT
Start: 2021-05-07 | End: 2021-05-07

## 2021-05-07 RX ORDER — LIDOCAINE HYDROCHLORIDE 20 MG/ML
INJECTION, SOLUTION EPIDURAL; INFILTRATION; INTRACAUDAL; PERINEURAL PRN
Status: DISCONTINUED | OUTPATIENT
Start: 2021-05-07 | End: 2021-05-07 | Stop reason: SDUPTHER

## 2021-05-07 RX ORDER — SODIUM CHLORIDE 0.9 % (FLUSH) 0.9 %
5-40 SYRINGE (ML) INJECTION PRN
Status: DISCONTINUED | OUTPATIENT
Start: 2021-05-07 | End: 2021-05-07 | Stop reason: HOSPADM

## 2021-05-07 RX ORDER — PANTOPRAZOLE SODIUM 20 MG/1
40 TABLET, DELAYED RELEASE ORAL DAILY
Qty: 30 TABLET | Refills: 0 | Status: SHIPPED | OUTPATIENT
Start: 2021-05-07 | End: 2021-09-13 | Stop reason: SDUPTHER

## 2021-05-07 RX ORDER — SODIUM CHLORIDE, SODIUM LACTATE, POTASSIUM CHLORIDE, CALCIUM CHLORIDE 600; 310; 30; 20 MG/100ML; MG/100ML; MG/100ML; MG/100ML
INJECTION, SOLUTION INTRAVENOUS CONTINUOUS PRN
Status: DISCONTINUED | OUTPATIENT
Start: 2021-05-07 | End: 2021-05-07 | Stop reason: SDUPTHER

## 2021-05-07 RX ORDER — ONDANSETRON 2 MG/ML
4 INJECTION INTRAMUSCULAR; INTRAVENOUS EVERY 6 HOURS PRN
Status: DISCONTINUED | OUTPATIENT
Start: 2021-05-07 | End: 2021-05-07 | Stop reason: HOSPADM

## 2021-05-07 RX ORDER — 0.9 % SODIUM CHLORIDE 0.9 %
80 INTRAVENOUS SOLUTION INTRAVENOUS ONCE
Status: COMPLETED | OUTPATIENT
Start: 2021-05-07 | End: 2021-05-07

## 2021-05-07 RX ORDER — SCOLOPAMINE TRANSDERMAL SYSTEM 1 MG/1
1 PATCH, EXTENDED RELEASE TRANSDERMAL ONCE
Status: DISCONTINUED | OUTPATIENT
Start: 2021-05-07 | End: 2021-05-07 | Stop reason: HOSPADM

## 2021-05-07 RX ORDER — SCOLOPAMINE TRANSDERMAL SYSTEM 1 MG/1
1 PATCH, EXTENDED RELEASE TRANSDERMAL
Status: DISCONTINUED | OUTPATIENT
Start: 2021-05-10 | End: 2021-05-07 | Stop reason: HOSPADM

## 2021-05-07 RX ORDER — ESTRADIOL 1 MG/1
1 TABLET ORAL DAILY
COMMUNITY

## 2021-05-07 RX ORDER — SODIUM CHLORIDE 9 MG/ML
25 INJECTION, SOLUTION INTRAVENOUS PRN
Status: DISCONTINUED | OUTPATIENT
Start: 2021-05-07 | End: 2021-05-07 | Stop reason: HOSPADM

## 2021-05-07 RX ADMIN — SODIUM CHLORIDE 1000 ML: 9 INJECTION, SOLUTION INTRAVENOUS at 00:11

## 2021-05-07 RX ADMIN — SODIUM CHLORIDE, POTASSIUM CHLORIDE, SODIUM LACTATE AND CALCIUM CHLORIDE: 600; 310; 30; 20 INJECTION, SOLUTION INTRAVENOUS at 16:02

## 2021-05-07 RX ADMIN — SODIUM CHLORIDE 80 ML: 9 INJECTION, SOLUTION INTRAVENOUS at 00:34

## 2021-05-07 RX ADMIN — ACETAMINOPHEN 650 MG: 325 TABLET ORAL at 17:05

## 2021-05-07 RX ADMIN — PROPOFOL 20 MG: 10 INJECTION, EMULSION INTRAVENOUS at 16:12

## 2021-05-07 RX ADMIN — LIDOCAINE HYDROCHLORIDE 100 MG: 20 INJECTION, SOLUTION EPIDURAL; INFILTRATION; INTRACAUDAL; PERINEURAL at 16:07

## 2021-05-07 RX ADMIN — PROPOFOL 20 MG: 10 INJECTION, EMULSION INTRAVENOUS at 16:10

## 2021-05-07 RX ADMIN — SODIUM CHLORIDE: 9 INJECTION, SOLUTION INTRAVENOUS at 05:34

## 2021-05-07 RX ADMIN — PROPOFOL 20 MG: 10 INJECTION, EMULSION INTRAVENOUS at 16:09

## 2021-05-07 RX ADMIN — CEFTRIAXONE SODIUM 1000 MG: 1 INJECTION, POWDER, FOR SOLUTION INTRAMUSCULAR; INTRAVENOUS at 00:47

## 2021-05-07 RX ADMIN — ONDANSETRON HYDROCHLORIDE 4 MG: 2 SOLUTION INTRAMUSCULAR; INTRAVENOUS at 15:24

## 2021-05-07 RX ADMIN — SODIUM CHLORIDE 1.41 MCG: 9 INJECTION, SOLUTION INTRAVENOUS at 09:00

## 2021-05-07 RX ADMIN — PANTOPRAZOLE SODIUM 40 MG: 40 INJECTION, POWDER, FOR SOLUTION INTRAVENOUS at 09:16

## 2021-05-07 RX ADMIN — SODIUM CHLORIDE, PRESERVATIVE FREE 20 ML: 5 INJECTION INTRAVENOUS at 09:16

## 2021-05-07 RX ADMIN — ONDANSETRON HYDROCHLORIDE 4 MG: 2 SOLUTION INTRAMUSCULAR; INTRAVENOUS at 09:16

## 2021-05-07 RX ADMIN — HYDROMORPHONE HYDROCHLORIDE 0.5 MG: 1 INJECTION, SOLUTION INTRAMUSCULAR; INTRAVENOUS; SUBCUTANEOUS at 03:27

## 2021-05-07 RX ADMIN — HYDROMORPHONE HYDROCHLORIDE 0.5 MG: 1 INJECTION, SOLUTION INTRAMUSCULAR; INTRAVENOUS; SUBCUTANEOUS at 11:52

## 2021-05-07 RX ADMIN — IOPAMIDOL 75 ML: 755 INJECTION, SOLUTION INTRAVENOUS at 00:34

## 2021-05-07 RX ADMIN — ONDANSETRON 4 MG: 2 INJECTION INTRAMUSCULAR; INTRAVENOUS at 17:07

## 2021-05-07 RX ADMIN — MORPHINE SULFATE 4 MG: 4 INJECTION, SOLUTION INTRAMUSCULAR; INTRAVENOUS at 00:47

## 2021-05-07 RX ADMIN — PROPOFOL 50 MG: 10 INJECTION, EMULSION INTRAVENOUS at 16:07

## 2021-05-07 RX ADMIN — SODIUM CHLORIDE 1.41 MCG: 9 INJECTION, SOLUTION INTRAVENOUS at 10:23

## 2021-05-07 RX ADMIN — ONDANSETRON HYDROCHLORIDE 4 MG: 2 SOLUTION INTRAMUSCULAR; INTRAVENOUS at 03:27

## 2021-05-07 RX ADMIN — HYDROMORPHONE HYDROCHLORIDE 0.5 MG: 1 INJECTION, SOLUTION INTRAMUSCULAR; INTRAVENOUS; SUBCUTANEOUS at 17:05

## 2021-05-07 RX ADMIN — ONDANSETRON 4 MG: 2 INJECTION INTRAMUSCULAR; INTRAVENOUS at 00:23

## 2021-05-07 RX ADMIN — Medication 5.7 MILLICURIE: at 09:35

## 2021-05-07 RX ADMIN — SODIUM CHLORIDE, PRESERVATIVE FREE 10 ML: 5 INJECTION INTRAVENOUS at 00:34

## 2021-05-07 ASSESSMENT — PAIN SCALES - GENERAL
PAINLEVEL_OUTOF10: 0
PAINLEVEL_OUTOF10: 7
PAINLEVEL_OUTOF10: 6
PAINLEVEL_OUTOF10: 0
PAINLEVEL_OUTOF10: 0
PAINLEVEL_OUTOF10: 6
PAINLEVEL_OUTOF10: 5
PAINLEVEL_OUTOF10: 6

## 2021-05-07 ASSESSMENT — PAIN DESCRIPTION - PROGRESSION
CLINICAL_PROGRESSION: GRADUALLY IMPROVING
CLINICAL_PROGRESSION: NOT CHANGED

## 2021-05-07 ASSESSMENT — PAIN - FUNCTIONAL ASSESSMENT
PAIN_FUNCTIONAL_ASSESSMENT: PREVENTS OR INTERFERES SOME ACTIVE ACTIVITIES AND ADLS
PAIN_FUNCTIONAL_ASSESSMENT: PREVENTS OR INTERFERES SOME ACTIVE ACTIVITIES AND ADLS

## 2021-05-07 ASSESSMENT — PULMONARY FUNCTION TESTS
PIF_VALUE: 1

## 2021-05-07 ASSESSMENT — PAIN DESCRIPTION - FREQUENCY
FREQUENCY: CONTINUOUS
FREQUENCY: CONTINUOUS

## 2021-05-07 ASSESSMENT — PAIN DESCRIPTION - LOCATION
LOCATION: ABDOMEN
LOCATION: ABDOMEN

## 2021-05-07 ASSESSMENT — PAIN DESCRIPTION - DESCRIPTORS
DESCRIPTORS: ACHING
DESCRIPTORS: BURNING;OTHER (COMMENT)

## 2021-05-07 ASSESSMENT — PAIN DESCRIPTION - ORIENTATION
ORIENTATION: RIGHT
ORIENTATION: RIGHT

## 2021-05-07 ASSESSMENT — PAIN DESCRIPTION - ONSET
ONSET: ON-GOING
ONSET: ON-GOING

## 2021-05-07 ASSESSMENT — PAIN DESCRIPTION - PAIN TYPE
TYPE: ACUTE PAIN
TYPE: ACUTE PAIN

## 2021-05-07 ASSESSMENT — LIFESTYLE VARIABLES: SMOKING_STATUS: 0

## 2021-05-07 NOTE — CONSULTS
GI Consult Note:    Name: Cecille Miranda  MRN: [id number]     [place]: [de-identified]  Room: 2010/2010-02    Admit Date: 5/6/2021  PCP: Jaime Hoffman MD    Physician Requesting Consult: Jaime Hoffman MD     Reason for Consult:    Upper abdominal pain  Nausea and vomiting  Irritable bowel syndrome  Constipation  Anxiety issues  Fatty liver    Chief Complaint:     Chief Complaint   Patient presents with    Abdominal Pain     Pt told to come in by Dr. Woody Manrique.          History Obtained From:     Patient and EMR    History of Present Illness:      Cecille Miranda is a African American 38 y.o.  female who presents with Abdominal Pain (Pt told to come in by Dr. Woody Manrique.  )    This patient is evaluated accompanied by her mother and family    she is a pleasant 68-year-old  female  She has history for some anxiety issues  Patient has history for chronic intermittent abdominal pain  This pain has been worse lately more so in the right upper quadrant of the abdomen  Patient also has some nausea and vomiting associated with this  Denies any hematemesis coffee-ground emesis  Patient has been complaining of some abdominal pains, off and on cramping  Also complains of abdominal bloating and gas  Has off and on nausea without any sig vomiting  Has some alternating constipation and diarrhea  Has no weight loss  Has some anxiety issues  Denies smoking alcohol abuse illicit drug usage  Her CT scan showing fatty infiltration of the liver  HIDA scan was negative for gallbladder problems  She uses NSAIDs  Symptoms:  Onset:  Location:  abdomen  Duration:  day(s)  Severity:  moderate, severe  Quality:  intermittent      Past Medical History:     Past Medical History:   Diagnosis Date    Anxiety     Nausea         Past Surgical History:     Past Surgical History:   Procedure Laterality Date    COSMETIC SURGERY      HYSTERECTOMY      OVARY REMOVAL Left     TUBAL LIGATION Bilateral 20134    UPPER GASTROINTESTINAL ENDOSCOPY  12/2/15        Medications Prior to Admission:       Prior to Admission medications    Medication Sig Start Date End Date Taking? Authorizing Provider   estradiol (ESTRACE) 1 MG tablet Take 1 mg by mouth daily   Yes Historical Provider, MD   pantoprazole (PROTONIX) 20 MG tablet Take 1 tablet by mouth daily 5/5/21  Yes ALEX Gonzalez CNP   busPIRone (BUSPAR) 10 MG tablet Take 10 mg by mouth daily    Yes Historical Provider, MD        Allergies:       Latex and Reglan [metoclopramide]    Social History:     Tobacco:    reports that she has never smoked. She has never used smokeless tobacco.  Alcohol:      reports no history of alcohol use. Drug Use:  reports no history of drug use.     Family History:     Family History   Problem Relation Age of Onset    Diabetes Mother     Cancer Paternal Uncle         stomach    Cancer Paternal Grandmother         breast, stomach    Diabetes Paternal Grandmother     High Blood Pressure Paternal Grandfather        Review of Systems:     Positive and Negative as described in HPI    Constitutional:  negative for  fevers, chills, sweats, mild fatigue, and weight loss  HEENT:  negative for vision or hearing changes,   Respiratory:  negative for shortness of breath, cough, or congestion  Cardiovascular:  negative for  chest pain, palpitations  Gastrointestinal: Mild nausea, vomiting, diarrhea, constipation, abdominal pain  Genitourinary:  negative for frequency, dysuria  Integument:  negative for rash, skin lesions  Musculoskeletal: Positive muscle aches or joint pain  Neurological:  negative for headaches, dizziness, lightheadedness, numbness, pain and tingling extrimities  Behavior/Psych:  negative for depression and positive anxiety    Code Status:  Full Code    Physical Exam:     Vitals:  /65   Pulse 76   Temp 98.1 °F (36.7 °C) (Oral)   Resp 16   Ht 5' 6\" (1.676 m)   Wt 155 lb 8 oz (70.5 kg)   LMP 07/01/2016   SpO2 97%   BMI 25.10 kg/m²   Temp (24hrs), Av.9 °F (36.6 °C), Min:97.8 °F (36.6 °C), Max:98.1 °F (36.7 °C)      General appearance - alert, well appearing, and in no acute distress  Mental status - oriented to person, place, and time with anxious affect  Head - normocephalic and atraumatic  Eyes - pupils equal and reactive, extraocular eye movements intact, conjunctiva clear  Ears - hearing appears to be intact  Nose - no drainage noted  Mouth - mucous membranes moist  Neck - supple, no carotid bruits, thyroid not palpable  Chest - clear to auscultation, normal effort  Heart - normal rate, regular rhythm, no murmurs  Abdomen - soft, mild upper quadrant tenderness, nondistended, bowel sounds present all four quadrants, no masses, hepatomegaly or splenomegaly.  No hernias  Neurological - normal speech, no focal findings or movement disorder noted, cranial nerves II through XII grossly intact  Extremities - peripheral pulses palpable, no pedal edema or calf pain with palpation  Skin - no gross lesions, rashes, or induration noted  Cranial Nerves : grossly intact  Lymph nodes: not done at this time    Data:   CBC:   Lab Results   Component Value Date    WBC 3.5 2021    RBC 3.70 2021    HGB 10.1 2021    HCT 32.1 2021    MCV 86.8 2021    MCH 27.3 2021    MCHC 31.5 2021    RDW 11.8 2021     2021    MPV 9.8 2021     CBC with Differential:    Lab Results   Component Value Date    WBC 3.5 2021    RBC 3.70 2021    HGB 10.1 2021    HCT 32.1 2021     2021    MCV 86.8 2021    MCH 27.3 2021    MCHC 31.5 2021    RDW 11.8 2021    LYMPHOPCT 60 2021    MONOPCT 9 2021    BASOPCT 1 2021    MONOSABS 0.38 2021    LYMPHSABS 2.62 2021    EOSABS 0.16 2021    BASOSABS 0.04 2021    DIFFTYPE NOT REPORTED 2021     Hemoglobin/Hematocrit:    Lab Results   Component Value Date    HGB 10.1 2021    HCT 32.1 05/07/2021     CMP:    Lab Results   Component Value Date     05/07/2021    K 4.1 05/07/2021     05/07/2021    CO2 22 05/07/2021    BUN 10 05/07/2021    CREATININE 0.76 05/07/2021    GFRAA >60 05/07/2021    LABGLOM >60 05/07/2021    GLUCOSE 94 05/07/2021    PROT 7.4 05/07/2021    LABALBU 4.2 05/07/2021    CALCIUM 8.3 05/07/2021    BILITOT 0.23 05/07/2021    ALKPHOS 91 05/07/2021    AST 23 05/07/2021    ALT 17 05/07/2021     BMP:    Lab Results   Component Value Date     05/07/2021    K 4.1 05/07/2021     05/07/2021    CO2 22 05/07/2021    BUN 10 05/07/2021    LABALBU 4.2 05/07/2021    CREATININE 0.76 05/07/2021    CALCIUM 8.3 05/07/2021    GFRAA >60 05/07/2021    LABGLOM >60 05/07/2021    GLUCOSE 94 05/07/2021     PT/INR:    Lab Results   Component Value Date    PROTIME 10.6 05/19/2017    INR 1.0 05/19/2017     PTT:    Lab Results   Component Value Date    APTT 29.9 05/19/2017   [APTT}    Assesment:     Primary Problem  <principal problem not specified>    Active Hospital Problems    Diagnosis Date Noted    Right upper quadrant pain [R10.11] 05/07/2021     Upper abdominal pain  Nausea and vomiting  Irritable bowel syndrome  Constipation  Anxiety issues  Fatty liver  Plan:     1. Secondary to persistent pain nausea and NSAID usage we will plan upper endoscopy on this patient she has been kept n.p.o. for that reason  The Endoscopic procedure was explained to the patient in detail  The prep and NPO were explained  All the Risks, Benefits, and Alternatives were explained  Risk of Bleeding, Perforation and Cardio Respiratory risks were explained  her questions were answered  The patient has verbalized understanding and agreement to this plan. Pt was advised in detail about some life style and dietary modifications. She was advised about avoidance of caffeine, nicotine and chocolate. Pt was also told to stay away from any kind of fast foods, soda pops.  She was also advised to avoid lots of spices, grease and fried food etc.     Instructions were also given about trying to arrange the timing, quality and quantity of food. Instructions were given about using ample amount of fiber including dietary and supplemental fiber either metamucil, bennafiber or citrucell etc.  Pt was advised about drinking ample amount of water without any colors or chemicals. Stress was given about regular exercise. Pt has verbalized understanding and agreement to these modifications. The patient was instructed to start taking some OTC Probiotics products   These are available over the counter at the Pharmacy stores and Grocery stores  He was explained about the beneficial effects they have in the GI track  They will help to establish the good bacterial wilfrido and will help with the digestion and bowel movements  The patient has verbalized understanding and agreement to this plan          Thank you for allowing me to participate in the care of your patient. Please feel free to contact me with any questions or concerns.      Electronically signed by Rocky Daniels MD on 5/7/2021 at 2:48 PM     Copy sent to Dr. Jie Arredondo MD

## 2021-05-07 NOTE — ED NOTES
Pt requesting something for pain and nausea prior to CT.   Dr. Hoang Parikh notified, awaiting orders     Raymundo Lim, ALENA  05/07/21 9640

## 2021-05-07 NOTE — ED NOTES
Pt to ED c/o abdominal pain x 3 days. Pt reports gradually worsening abdominal pain. Pt reports associated nausea, and reports that she feels very gassy. Pt reports that she has lost about 5 pounds since onset of pain d/t pain and nausea. Pt was seen in ED last night and discharge home. Pt reports that she followed up with her PCP Dr. Ricketts Sic today when she had blood work and an [address] done. Pt reports getting a call from Dr. Delvin Benson and states that she was told to come to the ED for admission for gall bladder and to see general surgery. Pt reports taking tylenol tonight around 1900 without relief. On exam, pt with palpable tenderness to the right abdomen which is worse to the RUQ, pt afebrile, pt awake and oriented with stable vital signs.       Dawood Maurer RN  05/07/21 0001

## 2021-05-07 NOTE — ED PROVIDER NOTES
EMERGENCY DEPARTMENT ENCOUNTER    Pt Name: Gabriel Soriano  MRN: [id number]  [place] 1985  Date of evaluation: 5/6/21  CHIEF COMPLAINT       Chief Complaint   Patient presents with    Abdominal Pain     Pt told to come in by Dr. Aly Vasquez. HISTORY OF PRESENT ILLNESS   Patient is a 24-year-old female with right upper quadrant pain x4 days who was advised by Dr. Josette Rueda to present to the ED for admission to hospital.   CT A/P completed yesterday was negative for acute hepatobiliary disease. Today, labs showed elevation in sed rate and CRP. She has had intermittent mild right upper quadrant pain for a few months. However, pain is becomes more severe over the past 4 days. No fevers, cough, shortness of breath, nausea, vomiting, changes in urine or stool. REVIEW OF SYSTEMS     Review of Systems   All other systems reviewed and are negative. PASTMEDICAL HISTORY     Past Medical History:   Diagnosis Date    Anxiety     Nausea      SURGICAL HISTORY       Past Surgical History:   Procedure Laterality Date    COSMETIC SURGERY      OVARY REMOVAL Left     TUBAL LIGATION Bilateral 20134    UPPER GASTROINTESTINAL ENDOSCOPY  12/2/15     CURRENT MEDICATIONS       Previous Medications    BUSPIRONE (BUSPAR) 10 MG TABLET    Take 20 mg by mouth daily    ESTROGENS CONJ SYNTHETIC A PO    Take by mouth    PANTOPRAZOLE (PROTONIX) 20 MG TABLET    Take 1 tablet by mouth daily     ALLERGIES     is allergic to latex and reglan [metoclopramide]. FAMILY HISTORY     She indicated that her mother is alive. She indicated that her father is alive. She indicated that her paternal grandmother is alive. She indicated that her paternal grandfather is alive. She indicated that her paternal uncle is alive.      SOCIAL HISTORY       Social History     Tobacco Use    Smoking status: Never Smoker    Smokeless tobacco: Never Used   Substance Use Topics    Alcohol use: No    Drug use: No     PHYSICAL EXAM     INITIAL VITALS: /78   Pulse 69   Temp 97.9 °F (36.6 °C) (Oral)   Resp 16   Ht 5' 6\" (1.676 m)   Wt 150 lb (68 kg)   LMP 07/01/2016   SpO2 100%   BMI 24.21 kg/m²    Physical Exam  HENT:      Head: Normocephalic. Right Ear: External ear normal.      Left Ear: External ear normal.      Nose: Nose normal.   Eyes:      Conjunctiva/sclera: Conjunctivae normal.   Cardiovascular:      Rate and Rhythm: Normal rate. Pulmonary:      Effort: Pulmonary effort is normal.   Abdominal:      General: Abdomen is flat. Tenderness: There is abdominal tenderness in the right upper quadrant. There is no guarding or rebound. Positive signs include Harmon's sign. Skin:     General: Skin is dry. Neurological:      Mental Status: She is alert. Mental status is at baseline. Psychiatric:         Mood and Affect: Mood normal.         Behavior: Behavior normal.         MEDICAL DECISION MAKING:   The patient is hemodynamically stable, afebrile, nontoxic-appearing. Physical exam notable for right upper quadrant tenderness, positive Harmon sign. Based on history and exam concerning for hepatobiliary disease. ED plan for basic labs, CT and pelvis, UA, blood cultures, Rocephin, admission. DIAGNOSTIC RESULTS   EKG:All EKG's are interpreted by the Emergency Department Physician who either signs or Co-signs this chart in the absence of a cardiologist.        RADIOLOGY:All plain film, CT, MRI, and formal ultrasound images (except ED bedside ultrasound) are read by the radiologist, see reports below, unless otherwisenoted in MDM or here. CT ABDOMEN PELVIS W IV CONTRAST Additional Contrast? None   Final Result   Mild diffuse hepatic steatosis. Otherwise, unremarkable contrast enhanced CT abdomen and pelvis examination. LABS: All lab results were reviewed by myself, and all abnormals are listed below.   Labs Reviewed   CBC WITH AUTO DIFFERENTIAL - Abnormal; Notable for the following components:       Result Value    Hemoglobin 11.6 (*)     Seg Neutrophils 26 (*)     Lymphocytes 60 (*)     Segs Absolute 1.15 (*)     All other components within normal limits   COMPREHENSIVE METABOLIC PANEL W/ REFLEX TO MG FOR LOW K - Abnormal; Notable for the following components:    Glucose 103 (*)     Total Bilirubin 0.23 (*)     All other components within normal limits   URINE RT REFLEX TO CULTURE - Abnormal; Notable for the following components:    Specific Gravity, UA 1.043 (*)     All other components within normal limits   CULTURE, BLOOD 1   CULTURE, BLOOD 1   COVID-19, RAPID   LIPASE   LACTIC ACID   SEDIMENTATION RATE   HCG, SERUM, QUALITATIVE   C-REACTIVE PROTEIN       EMERGENCY DEPARTMENTCOURSE:         Vitals:    Vitals:    05/06/21 2310   BP: 124/78   Pulse: 69   Resp: 16   Temp: 97.9 °F (36.6 °C)   TempSrc: Oral   SpO2: 100%   Weight: 150 lb (68 kg)   Height: 5' 6\" (1.676 m)       The patient was given the following medications while in the emergency department:  Orders Placed This Encounter   Medications    0.9 % sodium chloride bolus    cefTRIAXone (ROCEPHIN) 1000 mg IVPB in 50 mL D5W minibag     Order Specific Question:   Antimicrobial Indications     Answer:   Aspiration Pneumonia    0.9 % sodium chloride bolus    iopamidol (ISOVUE-370) 76 % injection 75 mL    sodium chloride flush 0.9 % injection 10 mL    morphine sulfate (PF) injection 4 mg    ondansetron (ZOFRAN) injection 4 mg     CONSULTS:  IP CONSULT TO GENERAL SURGERY  IP CONSULT TO GENERAL SURGERY    FINAL IMPRESSION      1. Abdominal pain, right upper quadrant          DISPOSITION/PLAN   DISPOSITION Admitted 05/07/2021 01:30:51 AM      PATIENT REFERRED TO:  No follow-up provider specified.   DISCHARGE MEDICATIONS:  New Prescriptions    No medications on file     Amish Hernandez MD  Attending Emergency Physician                    Lalitha Tillman MD  05/07/21 9070

## 2021-05-07 NOTE — PROGRESS NOTES
Patient admitted to room  2010-2 From ER via stretcher with belongings. VSS  Patient oriented to room and call light. Assessment as charted. Admission database done. Orders and plan of care reviewed. Call light in reach. Will continue to monitor.

## 2021-05-07 NOTE — ED NOTES
Attempt to call report.  No answer at the nurse's station     [place], [facility]  05/07/21 0151

## 2021-05-07 NOTE — PROGRESS NOTES
Pt discharged to home with all belongings, copy of AVS, written prescription, and instructions to make a follow up appointment with Dr. Shon Champagne within 3-4 weeks. Pt left hospital unit via wheelchair assisted by PCT. Pt's Mother and sister were with her at discharge. Pt left hospital grounds driven by private auto driven by her family.   Electronically signed by Marisel Thakkar RN on 5/7/2021 at 6:17 PM

## 2021-05-07 NOTE — PROGRESS NOTES
Surgery:  Multiple CAT scans demonstrating no evidence of gallbladder related pathology or other acute general surgery pathology. Normal liver function tests  Ultrasound demonstrating no evidence of gallbladder pathology, bile duct dilatation, gallstones, pericholecystic fluid, or gallbladder wall thickening  HIDA scan demonstrating a normal ejection fraction of 65% with normal visualization of the gallbladder and biliary tree in a timely fashion. We will defer further management to gastroenterology. This is clearly not a gallbladder issue. Please call with any questions or concerns.

## 2021-05-07 NOTE — ED NOTES
Pt medicated with morphine per order and ceftriaxone hung per order.   Pt resting on stretcher at this time and call light within reach       [employer], RN  05/07/21 2934

## 2021-05-07 NOTE — OP NOTE
PROCEDURE NOTE    DATE OF PROCEDURE: 5/7/2021     SURGEON: Cordelia Head MD    ASSISTANT: None    PREOPERATIVE DIAGNOSIS: GERD  EPIG PAINS    POSTOPERATIVE DIAGNOSIS: As described below    OPERATION: Upper GI endoscopy with Biopsy    ANESTHESIA: MAC PER ANESTHESIA     ESTIMATED BLOOD LOSS: Less than 50 ml    COMPLICATIONS: None. SPECIMENS:  Was Obtained:     HISTORY: The patient is a 38y.o. year old female with history of above preop diagnosis. I recommended esophagogastroduodenoscopy with possible biopsy and I explained the risk, benefits, expected outcome, and alternatives to the procedure. Risks included but are not limited to bleeding, infection, respiratory distress, hypotension, and perforation of the esophagus, stomach, or duodenum. Patient understands and is in agreement. PROCEDURE: The patient was given IV conscious sedation. The patient's SPO2 remained above 90% throughout the procedure. The gastroscope was inserted orally and advanced under direct vision through the esophagus, through the stomach, through the pylorus, and into the descending duodenum. Findings:    Retropharyngeal area was grossly normal appearing    Esophagus: normal    Stomach:    Fundus: normal    Body: normal    Antrum: abnormal: HEALING ANTRAL ULCER WAS NOTED IN THE ANTRAL AREA ABOUT ONE CM WITH WHITE EXUDATES  BIOPSIES TAKEN, PICTURES WERE TAKEN    Duodenum:     Descending: normal    Bulb: normal    The scope was removed and the patient tolerated the procedure well. Recommendations/Plan:   1. F/U Biopsies  2. F/U In Office in 3-4 weeks  3. Discussed with the family  4.  Post sedation patient was stable with stable vital signs and stable O2 saturations    Electronically signed by Cordelia Head MD  on 5/7/2021 at 4:12 PM

## 2021-05-07 NOTE — ED NOTES
Pt returned from CT, requesting pain medication at this time      Raymundo Lim, ALENA  05/07/21 [id number]

## 2021-05-07 NOTE — PLAN OF CARE
Problem: Pain:  Goal: Pain level will decrease  Description: Pain level will decrease  5/7/2021 0714 by Marcin Vang RN  Outcome: Ongoing  5/7/2021 0420 by Jumana Martínez RN  Outcome: Ongoing  Goal: Control of acute pain  Description: Control of acute pain  5/7/2021 0714 by Marcin Vang RN  Outcome: Ongoing  5/7/2021 0420 by Jumana Martínez RN  Outcome: Ongoing  Note: Pain level assessment complete.    Patient educated on pain scale and control interventions  PRN pain medication given per patient request  Patient instructed to call out with new onset of pain or unrelieved pain    Goal: Control of chronic pain  Description: Control of chronic pain  5/7/2021 0714 by Marcin Vang RN  Outcome: Ongoing  5/7/2021 0420 by Jumana Martínez RN  Outcome: Ongoing     Problem: Falls - Risk of:  Goal: Will remain free from falls  Description: Will remain free from falls  Outcome: Ongoing  Goal: Absence of physical injury  Description: Absence of physical injury  Outcome: Ongoing

## 2021-05-07 NOTE — ED NOTES
Attempt to call report.  RN is busy with pt care and will call back     Queenie Coronado RN  05/07/21 1467

## 2021-05-07 NOTE — PROGRESS NOTES
Pt admitted to room 2010 from PACU  Oriented to room and call light/tv controls. Bed in lowest position, wheels locked, 2/4 side rails up  Call light in reach, room free of clutter, adequate lighting provided.   Electronically signed by Kitty Rodríguez RN on 5/7/2021 at 4:56 PM

## 2021-05-07 NOTE — ANESTHESIA PRE PROCEDURE
Department of Anesthesiology  Preprocedure Note       Name:  Valerie Hinds   Age:  38 y.o.  :  1985                                          MRN:  [id number]         Date:  2021      Surgeon: Hiram Ornelas):  Angeli Salazar MD    Procedure: Procedure(s):  EGD ESOPHAGOGASTRODUODENOSCOPY    Medications prior to admission:   Prior to Admission medications    Medication Sig Start Date End Date Taking? Authorizing Provider   estradiol (ESTRACE) 1 MG tablet Take 1 mg by mouth daily   Yes Historical Provider, MD   pantoprazole (PROTONIX) 20 MG tablet Take 1 tablet by mouth daily 21  Yes ALEX Enciso CNP   busPIRone (BUSPAR) 10 MG tablet Take 10 mg by mouth daily    Yes Historical Provider, MD       Current medications:    Current Facility-Administered Medications   Medication Dose Route Frequency Provider Last Rate Last Admin    sodium chloride flush 0.9 % injection 5-40 mL  5-40 mL Intravenous 2 times per day Shaka Bowen MD   20 mL at 21 0916    sodium chloride flush 0.9 % injection 5-40 mL  5-40 mL Intravenous PRN Shaka Bowen MD        0.9 % sodium chloride infusion  25 mL Intravenous PRN Shaka Bowen MD        acetaminophen (TYLENOL) tablet 650 mg  650 mg Oral Q4H PRN Shaka Bowen MD        0.9 % sodium chloride infusion   Intravenous Continuous Shaka Bowen MD 75 mL/hr at 21 0534 New Bag at 21 0534    ondansetron (ZOFRAN) injection 4 mg  4 mg Intravenous Q6H PRN Shaka Bowen MD   4 mg at 21 0916    [START ON 2021] cefTRIAXone (ROCEPHIN) 1000 mg IVPB in 50 mL D5W minibag  1,000 mg Intravenous Q24H Shaka Bowen MD        HYDROmorphone (DILAUDID) injection 0.5 mg  0.5 mg Intravenous Q4H PRN Shaka Bowen MD   0.5 mg at 21 1152    pantoprazole (PROTONIX) injection 40 mg  40 mg Intravenous Daily Shaka Bowen MD   40 mg at 21 0916       Allergies:     Allergies   Allergen Reactions    Latex Itching    Reglan [Metoclopramide] Anxiety       Problem List:    Patient Active Problem List   Diagnosis Code    High-risk pregnancy O09.90    Epigastric pain R10.13    Nausea R11.0    Bloating R14.0    Generalized abdominal pain R10.84    Right upper quadrant pain R10.11    Abdominal pain, right upper quadrant R10.11    Other irritable bowel syndrome K58.8    Anxiety F41.9       Past Medical History:        Diagnosis Date    Anxiety     Nausea        Past Surgical History:        Procedure Laterality Date    COSMETIC SURGERY      HYSTERECTOMY      OVARY REMOVAL Left     TUBAL LIGATION Bilateral 57715    UPPER GASTROINTESTINAL ENDOSCOPY  12/2/15       Social History:    Social History     Tobacco Use    Smoking status: Never Smoker    Smokeless tobacco: Never Used   Substance Use Topics    Alcohol use: No                                Counseling given: No      Vital Signs (Current):   Vitals:    05/06/21 2310 05/07/21 0237 05/07/21 0657   BP: 124/78 131/75 110/65   Pulse: 69 63 76   Resp: 16 16 16   Temp: 97.9 °F (36.6 °C) 97.8 °F (36.6 °C) 98.1 °F (36.7 °C)   TempSrc: Oral Oral Oral   SpO2: 100% 98% 97%   Weight: 150 lb (68 kg) 155 lb 8 oz (70.5 kg)    Height: 5' 6\" (1.676 m) 5' 6\" (1.676 m)                                               BP Readings from Last 3 Encounters:   05/07/21 110/65   05/05/21 121/82   12/08/19 (!) 139/92       NPO Status:                                                                                 BMI:   Wt Readings from Last 3 Encounters:   05/07/21 155 lb 8 oz (70.5 kg)   05/05/21 149 lb (67.6 kg)   12/08/19 152 lb (68.9 kg)     Body mass index is 25.1 kg/m².     CBC:   Lab Results   Component Value Date    WBC 3.5 05/07/2021    RBC 3.70 05/07/2021    HGB 10.1 05/07/2021    HCT 32.1 05/07/2021    MCV 86.8 05/07/2021    RDW 11.8 05/07/2021     05/07/2021       CMP:   Lab Results   Component Value Date     05/07/2021    K 4.1 05/07/2021     05/07/2021    CO2 22 05/07/2021    BUN 10 05/07/2021    CREATININE 0.76 05/07/2021    GFRAA >60 05/07/2021    LABGLOM >60 05/07/2021    GLUCOSE 94 05/07/2021    PROT 7.4 05/07/2021    CALCIUM 8.3 05/07/2021    BILITOT 0.23 05/07/2021    ALKPHOS 91 05/07/2021    AST 23 05/07/2021    ALT 17 05/07/2021       POC Tests: No results for input(s): POCGLU, POCNA, POCK, POCCL, POCBUN, POCHEMO, POCHCT in the last 72 hours. Coags:   Lab Results   Component Value Date    PROTIME 10.6 05/19/2017    INR 1.0 05/19/2017    APTT 29.9 05/19/2017       HCG (If Applicable):   Lab Results   Component Value Date    HCG NEGATIVE 05/05/2021        ABGs: No results found for: PHART, PO2ART, DQU1CTN, CIW2AGJ, BEART, D3KFVKSE     Type & Screen (If Applicable):  No results found for: LABABO, LABRH    Drug/Infectious Status (If Applicable):  No results found for: HIV, HEPCAB    COVID-19 Screening (If Applicable):   Lab Results   Component Value Date    COVID19 Not Detected 05/07/2021           Anesthesia Evaluation  Patient summary reviewed and Nursing notes reviewed   history of anesthetic complications: PONV. Airway: Mallampati: II  TM distance: >3 FB   Neck ROM: full  Mouth opening: > = 3 FB Dental: normal exam   (+) implants      Pulmonary:Negative Pulmonary ROS and normal exam  breath sounds clear to auscultation      (-) COPD, asthma, sleep apnea and not a current smoker                           Cardiovascular:        (-) hypertension, past MI, CAD, CABG/stent, dysrhythmias,  angina and  CHF      Rhythm: regular  Rate: normal                    Neuro/Psych:   (+) depression/anxiety    (-) seizures, TIA and CVA           GI/Hepatic/Renal:   (+) GERD:, liver disease (fatty liver):,      (-) no renal disease      ROS comment: Abd pain.    Endo/Other:    (+) blood dyscrasia::., .    (-) diabetes mellitus, hypothyroidism, hyperthyroidism               Abdominal:           Vascular:                                        Anesthesia Plan      MAC     ASA 2 Induction: intravenous. Anesthetic plan and risks discussed with patient. Plan discussed with CRNA.                   Donte Saucedo MD   5/7/2021

## 2021-05-07 NOTE — CONSULTS
General Surgery:  Consult Note        PATIENT NAME: Jack Gómez   YOB: 1985    ADMISSION DATE: 5/6/2021 11:11 PM     Admitting Provider: Dr. Pratima Kennedy Physician: Dr. Nickerson Lias: 5/7/2021    Chief Complaint:  RUQ abdominal pain  Consult Regarding:  same    HISTORY OF PRESENT ILLNESS:  The patient is a 38 y.o. female  who was admitted yesterday due to her second time presenting to the emergency department with right upper quadrant pain. Patient states she has been having right upper quadrant pain for last 2 weeks that is intermittent, but worsens with food. Patient admits to nausea without emesis. She says she has not eaten in the last 3 days. Afebrile. Patient has had 2 separate CT scans within 24 hours of 1 another which revealed hepatic steatosis but otherwise normal exam.  Patient had a right upper quadrant ultrasound performed at [facility] yesterday which showed no evidence of cholelithiasis or acute cholecystitis. General surgery consulted for evaluation. On exam, patient is mildly tender in the right upper quadrant however does not guard or have evidence of rebound tenderness. Vital signs have been within normal limits and she is remained afebrile. Patient has no evidence of leukocytosis and CMP within normal limits.       Past Medical History:        Diagnosis Date    Anxiety     Nausea        Past Surgical History:        Procedure Laterality Date    COSMETIC SURGERY      HYSTERECTOMY      OVARY REMOVAL Left     TUBAL LIGATION Bilateral 20134    UPPER GASTROINTESTINAL ENDOSCOPY  12/2/15       Medications Prior to Admission:   Medications Prior to Admission: pantoprazole (PROTONIX) 20 MG tablet, Take 1 tablet by mouth daily  busPIRone (BUSPAR) 10 MG tablet, Take 10 mg by mouth daily   ESTROGENS CONJ SYNTHETIC A PO, Take by mouth    Allergies:  Latex and Reglan [metoclopramide]    Social History:   Social History     Socioeconomic History    Marital status:      Spouse name: Not on file    Number of children: Not on file    Years of education: Not on file    Highest education level: Not on file   Occupational History    Not on file   Social Needs    Financial resource strain: Not on file    Food insecurity     Worry: Not on file     Inability: Not on file    Transportation needs     Medical: Not on file     Non-medical: Not on file   Tobacco Use    Smoking status: Never Smoker    Smokeless tobacco: Never Used   Substance and Sexual Activity    Alcohol use: No    Drug use: No    Sexual activity: Not on file   Lifestyle    Physical activity     Days per week: Not on file     Minutes per session: Not on file    Stress: Not on file   Relationships    Social connections     Talks on phone: Not on file     Gets together: Not on file     Attends Shinto service: Not on file     Active member of club or organization: Not on file     Attends meetings of clubs or organizations: Not on file     Relationship status: Not on file    Intimate partner violence     Fear of current or ex partner: Not on file     Emotionally abused: Not on file     Physically abused: Not on file     Forced sexual activity: Not on file   Other Topics Concern    Not on file   Social History Narrative    Not on file       Family History:       Problem Relation Age of Onset    Diabetes Mother     Cancer Paternal Uncle         stomach    Cancer Paternal Grandmother         breast, stomach    Diabetes Paternal Grandmother     High Blood Pressure Paternal Grandfather        REVIEW OF SYSTEMS:    CONSTITUTIONAL: Denies recent weight loss, fatigue, fevers, chills. HEENT: Denies rhinorrhea, dysphagia, odynphagia. CARDIOVASCULAR: Denies history of MI, recent chest pain. RESPIRATORY: Denies recent history of shortness of breath or history of PE.   GASTROINTESTINAL: As per HPI, right upper quadrant abdominal pain the last 2 weeks  GENITOURINARY: Denies increased frequency or dysuria. HEMATOLOGIC/LYMPHATIC: Denies history of anemia or DVTs. ENDOCRINE: Denies history of thyroid problems or diabetes. NEURO: Denies history of CVA, TIA. Review of systems negative unless listed above. PHYSICAL EXAM:    VITALS:  /75   Pulse 63   Temp 97.8 °F (36.6 °C) (Oral)   Resp 16   Ht 5' 6\" (1.676 m)   Wt 155 lb 8 oz (70.5 kg)   LMP 07/01/2016   SpO2 98%   BMI 25.10 kg/m²   INTAKE/OUTPUT:   No intake or output data in the 24 hours ending 05/07/21 0550    CONSTITUTIONAL: Awake, alert, no acute distress  HEENT: Normocephalic/atraumatic, without obvious abnormality. NECK:  Supple, symmetrical, trachea midline   CARDIOVASCULAR: Regular rate and rhythm   LUNGS: No respiratory distress  ABDOMEN: Soft, nondistended, tender to palpation in the right upper quadrant without guarding, rebound tenderness, or evidence of peritoneal signs. MUSCULOSKELETAL: Muscle strength intact in all extremities bilaterally. NEUROLOGIC: CN II- XII intact. Gross motor intact without focal weakness. SKIN: No cyanosis, rashes, or edema noted.    Orientation:   oriented to person, place, and time      CBC with Differential:    Lab Results   Component Value Date    WBC 4.4 05/07/2021    RBC 4.16 05/07/2021    HGB 11.6 05/07/2021    HCT 36.5 05/07/2021     05/07/2021    MCV 87.7 05/07/2021    MCH 27.9 05/07/2021    MCHC 31.8 05/07/2021    RDW 11.9 05/07/2021    LYMPHOPCT 60 05/07/2021    MONOPCT 9 05/07/2021    BASOPCT 1 05/07/2021    MONOSABS 0.38 05/07/2021    LYMPHSABS 2.62 05/07/2021    EOSABS 0.16 05/07/2021    BASOSABS 0.04 05/07/2021    DIFFTYPE NOT REPORTED 05/07/2021     CMP:    Lab Results   Component Value Date     05/07/2021    K 3.8 05/07/2021     05/07/2021    CO2 22 05/07/2021    BUN 12 05/07/2021    CREATININE 0.74 05/07/2021    GFRAA >60 05/07/2021    LABGLOM >60 05/07/2021    GLUCOSE 103 05/07/2021    PROT 7.4 05/07/2021    LABALBU 4.2 05/07/2021    CALCIUM 9.5 05/07/2021    BILITOT 0.23 05/07/2021    ALKPHOS 91 05/07/2021    AST 23 05/07/2021    ALT 17 05/07/2021       Pertinent Radiology:   CT scan with IV contrast reviewed: Gallbladder is contracted with no evidence of cholelithiasis. Patient had ultrasound on 5/6 [facility] which revealed no cholelithiasis or evidence of acute cholecystitis. ASSESSMENT:  Active Hospital Problems    Diagnosis Date Noted    Right upper quadrant pain [R10.11] 05/07/2021       Plan:  1. Continue medical mgmt and supportive care per primary  2. Okay for regular diet from a surgery standpoint  3. Patient has had 3 imaging studies in the last 36 to 48 hours without evidence of gallbladder disorder. She has no evidence of cholelithiasis, thickening of the gallbladder, dilated ducts, or pericholecystic fluid. 4. Do not know why patient has hepatic steatosis, she denies drinking alcohol, she has a normal BMI. Potentially worth looking into? Will defer to primary team and GI team.   5. At this point, no surgical intervention planned. Electronically signed by Elvin Lin DO  on 5/7/2021 at 5:50 AM     Attending Physician Statement  I have discussed the case, including pertinent history and exam findings with the resident. I agree with the assessment, plan and orders as documented by the resident. Patient seen and examined. Agree with above. History of gastrointestinal issues in the past and has seen Dr. Layne Chi with CT enterography performed in 2016. She has vague right-sided abdominal pain with a negative Harmon sign. 3 imaging studies demonstrate no evidence of acute gallbladder pathology within the last 48 hours and her labs are essentially unremarkable. We defer further workup to GI.

## 2021-05-07 NOTE — ACP (ADVANCE CARE PLANNING)
Advance Care Planning     Advance Care Planning Activator (Inpatient)  Conversation Note      Date of ACP Conversation: 5-7-2021    [facility] Conducted with: Patient with Decision Making Capacity    ACP Activator: Lili Hands    {When Decision Maker makes decisions on behalf of the incapacitated patient: Decision Maker is asked to consider and make decisions based on patient values, known preferences, or best interests. Health Care Decision Maker:     Current Designated Health Care Decision Maker:   Sancho Clark (mom)  Phone: [phone]    Click here to complete Healthcare Decision Makers including section of the Healthcare Decision Maker Relationship (ie \"Primary\")    Care Preferences    Ventilation: \"If you were in your present state of health and suddenly became very ill and were unable to breathe on your own, what would your preference be about the use of a ventilator (breathing machine) if it were available to you? \"      Would the patient desire the use of ventilator (breathing machine)?: yes    \"If your health worsens and it becomes clear that your chance of recovery is unlikely, what would your preference be about the use of a ventilator (breathing machine) if it were available to you? \"     Would the patient desire the use of ventilator (breathing machine)?: No      Resuscitation  \"CPR works best to restart the heart when there is a sudden event, like a heart attack, in someone who is otherwise healthy. Unfortunately, CPR does not typically restart the heart for people who have serious health conditions or who are very sick. \"    \"In the event your heart stopped as a result of an underlying serious health condition, would you want attempts to be made to restart your heart (answer \"yes\" for attempt to resuscitate) or would you prefer a natural death (answer \"no\" for do not attempt to resuscitate)? \" yes       [] Yes   [x] No   Educated Patient / Aishwarya Cooperp regarding differences between Advance Directives and portable DNR orders.     Length of ACP Conversation in minutes:  5    Conversation Outcomes:  [x] ACP discussion completed  [] Existing advance directive reviewed with patient; no changes to patient's previously recorded wishes  [] New Advance Directive completed  [] Portable Do Not Rescitate prepared for Provider review and signature  [] POLST/POST/MOLST/MOST prepared for Provider review and signature      Follow-up plan:    [] Schedule follow-up conversation to continue planning  [] Referred individual to Provider for additional questions/concerns   [] Advised patient/agent/surrogate to review completed ACP document and update if needed with changes in condition, patient preferences or care setting    [] This note routed to one or more involved healthcare providers

## 2021-05-07 NOTE — ED NOTES
Writer at bedside to medicate pt. Pt refusing morphine at this time, states that she wants to take it when she is returned to ED from CT. Pt reports that she is anxious and has not taken her anxiety medications in two days.       Eric Abarca RN  05/07/21 8861

## 2021-05-07 NOTE — PROGRESS NOTES
Pt having EGD with Dr. Sandra Gama per Reed JOHNSON at 3:00pm.  Order placed. Pt educated and given written education about EGD procedure at bedside by Johana Zepeda.     Electronically signed by Kimberly Yuan RN on 5/7/2021 at 2:05 PM

## 2021-05-07 NOTE — H&P
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 has been having right upper quadrant epigastric pain for 1 week continuous rated about 7/10 without any radiation no associated nausea vomiting no diarrhea no change in bowel habits no weight loss no blood in the stools no black tarry stools, she has been taking nonsteroidals was in the emergency room couple days ago CT abdomen was negative, was seen in the office all her labs looked okay stat GB ultrasound was normal she was complaining of severe pain so she was advised to go to the emergency room        Past Medical History:   Diagnosis Date    Anxiety     Nausea          Past Surgical History:   Procedure Laterality Date    COSMETIC SURGERY      HYSTERECTOMY      OVARY REMOVAL Left     TUBAL LIGATION Bilateral [facility]    UPPER GASTROINTESTINAL ENDOSCOPY  12/2/15       Medications Prior to Admission:    Medications Prior to Admission: estradiol (ESTRACE) 1 MG tablet, Take 1 mg by mouth daily  pantoprazole (PROTONIX) 20 MG tablet, Take 1 tablet by mouth daily  busPIRone (BUSPAR) 10 MG tablet, Take 10 mg by mouth daily   [DISCONTINUED] ESTROGENS CONJ SYNTHETIC A PO, Take by mouth    Allergies:    Latex and Reglan [metoclopramide]    Social History:    reports that she has never smoked. She has never used smokeless tobacco. She reports that she does not drink alcohol or use drugs. Family History:   family history includes Cancer in her paternal grandmother and paternal uncle; Diabetes in her mother and paternal grandmother; High Blood Pressure in her paternal grandfather.     REVIEW OF SYSTEMS:    Constitutional: negative, No fever  Eyes: negative  Ears, nose, mouth, throat, and face: negative  Respiratory: negative, Cough no shortness of breath no wheeze  Cardiovascular: negative, No chest pain no palpitation no dizziness  Gastrointestinal: negative, History of present illness  Genitourinary:negative  Integument/breast: negative  Hematologic/lymphatic: negative  Musculoskeletal:negative  Neurological: negative, No headache no TIA no seizure  Behavioral/Psych: negative  Endocrine: negative, No polyuria no polydipsia or hypoplasia  Allergic/Immunologic: negative  PHYSICAL EXAM:  General Appearance: alert and oriented to person, place and time and in no acute distress  Skin: warm and dry, no rash or erythema  Head: normocephalic and atraumatic  Eyes: pupils equal, round, and reactive to light, conjunctivae normal and sclera anicteric    Neck: neck supple and non tender without mass   Pulmonary/Chest: clear to auscultation bilaterally- no wheezes, rales or rhonchi, normal air movement, no respiratory distress  Cardiovascular: normal rate, regular rhythm, normal S1 and S2, no gallops, intact distal pulses and no carotid bruits  Abdomen: soft, non-tender, non-distended, normal bowel sounds, no masses or organomegaly  Extremities: no edema and good pulses no luzma sign    Neurologic: Alert oriented x3 with no focal deficit    Vitals:  /80   Pulse 84   Temp 97.9 °F (36.6 °C) (Temporal)   Resp 19   Ht 5' 6\" (1.676 m)   Wt 155 lb 8 oz (70.5 kg)   LMP 07/01/2016   SpO2 99%   BMI 25.10 kg/m²       LABS:  CBC:   Lab Results   Component Value Date    WBC 3.5 05/07/2021    RBC 3.70 05/07/2021    HGB 10.1 05/07/2021    HCT 32.1 05/07/2021    MCV 86.8 05/07/2021    MCH 27.3 05/07/2021    MCHC 31.5 05/07/2021    RDW 11.8 05/07/2021     05/07/2021    MPV 9.8 05/07/2021     BMP:    Lab Results   Component Value Date     05/07/2021    K 4.1 05/07/2021     05/07/2021    CO2 22 05/07/2021    BUN 10 05/07/2021    LABALBU 4.2 05/07/2021    CREATININE 0.76 05/07/2021    CALCIUM 8.3 05/07/2021    GFRAA >60 05/07/2021    LABGLOM >60 05/07/2021    GLUCOSE 94 05/07/2021         ASSESSMENT:    upperAbdominal pain   gastric ulcer non bleedimg  Patient Active Problem List   Diagnosis    High-risk pregnancy    Epigastric pain    Nausea    Bloating    Generalized abdominal pain    Right upper quadrant pain    Abdominal pain, right upper quadrant    Other irritable bowel syndrome    Anxiety       PLAN:    Patient admitted with upper abdominal pain or work-up was negative GI IV and general surgery were consulted.   EGD which showed none bleeding gastric ulcer advised not to use any nonsteroidals and to be on Protonix and she will need a repeat EGD in about 8 weeks    Was discharged home today        Roxana Roberts MD  5:33 PM  5/7/2021

## 2021-05-09 NOTE — DISCHARGE SUMMARY
Physician Discharge Summary     Patient ID:  Bin Hidalgo  [id number]  38 y.o.  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 admitted through the emergency room with right upper quadrant and epigastric pain that has been continuous for about a week rated about 7/10 without any radiation worse with food no relieving factors head CT CT abdomen pelvis outpatient as well as GB ultrasound which were normal during her stay had HIDA scan which was normal, was seen by GI and by general surgery and underwent EGD which showed nonbleeding gastric ulcer    Discharge Exam:  See progress note from today    Disposition: home  Stable improved  Patient Instructions:   Discharge Medication List as of 5/7/2021  5:56 PM      CONTINUE these medications which have CHANGED    Details   pantoprazole (PROTONIX) 20 MG tablet Take 2 tablets by mouth daily, Disp-30 tablet, R-0Print         CONTINUE these medications which have NOT CHANGED    Details   estradiol (ESTRACE) 1 MG tablet Take 1 mg by mouth dailyHistorical Med      busPIRone (BUSPAR) 10 MG tablet Take 10 mg by mouth daily Historical Med         STOP taking these medications       dicyclomine (BENTYL) 10 MG capsule Comments:   Reason for Stopping:         ondansetron (ZOFRAN ODT) 4 MG disintegrating tablet Comments:   Reason for Stopping:         acetaminophen (TYLENOL) 325 MG tablet Comments:   Reason for Stopping:         ibuprofen (ADVIL;MOTRIN) 600 MG tablet Comments:   Reason for

## 2021-05-11 LAB — SURGICAL PATHOLOGY REPORT: NORMAL

## 2021-05-13 LAB
CULTURE: NORMAL
CULTURE: NORMAL
Lab: NORMAL
Lab: NORMAL
SPECIMEN DESCRIPTION: NORMAL
SPECIMEN DESCRIPTION: NORMAL

## 2021-06-09 ENCOUNTER — OFFICE VISIT (OUTPATIENT)
Dept: GASTROENTEROLOGY | Age: 36
End: 2021-06-09
Payer: MEDICARE

## 2021-06-09 VITALS
TEMPERATURE: 97.5 F | HEIGHT: 66 IN | WEIGHT: 153.2 LBS | HEART RATE: 98 BPM | DIASTOLIC BLOOD PRESSURE: 77 MMHG | SYSTOLIC BLOOD PRESSURE: 120 MMHG | OXYGEN SATURATION: 97 % | BODY MASS INDEX: 24.62 KG/M2

## 2021-06-09 DIAGNOSIS — K76.0 FATTY LIVER: ICD-10-CM

## 2021-06-09 DIAGNOSIS — K25.3 ANTRAL ULCER, ACUTE: Primary | ICD-10-CM

## 2021-06-09 DIAGNOSIS — F41.9 ANXIETY: ICD-10-CM

## 2021-06-09 DIAGNOSIS — D64.9 ANEMIA, UNSPECIFIED TYPE: ICD-10-CM

## 2021-06-09 PROCEDURE — G8420 CALC BMI NORM PARAMETERS: HCPCS | Performed by: INTERNAL MEDICINE

## 2021-06-09 PROCEDURE — 1036F TOBACCO NON-USER: CPT | Performed by: INTERNAL MEDICINE

## 2021-06-09 PROCEDURE — 99214 OFFICE O/P EST MOD 30 MIN: CPT | Performed by: INTERNAL MEDICINE

## 2021-06-09 PROCEDURE — G8427 DOCREV CUR MEDS BY ELIG CLIN: HCPCS | Performed by: INTERNAL MEDICINE

## 2021-06-09 RX ORDER — CITALOPRAM 10 MG/1
10 TABLET ORAL DAILY
Qty: 30 TABLET | Refills: 3 | Status: SHIPPED | OUTPATIENT
Start: 2021-06-09 | End: 2021-08-30 | Stop reason: ALTCHOICE

## 2021-06-09 ASSESSMENT — ENCOUNTER SYMPTOMS
TROUBLE SWALLOWING: 0
CONSTIPATION: 1
RECTAL PAIN: 0
DIARRHEA: 0
SORE THROAT: 0
BACK PAIN: 1
NAUSEA: 1
VOICE CHANGE: 0
SHORTNESS OF BREATH: 0
ANAL BLEEDING: 0
COUGH: 0
VOMITING: 0
ABDOMINAL PAIN: 1
BLOOD IN STOOL: 1
ABDOMINAL DISTENTION: 1
CHOKING: 0

## 2021-06-09 NOTE — PROGRESS NOTES
 COSMETIC SURGERY      HYSTERECTOMY      OVARY REMOVAL Left     TUBAL LIGATION Bilateral 20134    UPPER GASTROINTESTINAL ENDOSCOPY  12/2/15    UPPER GASTROINTESTINAL ENDOSCOPY N/A 5/7/2021    EGD BIOPSY performed by Samir Canales MD at [address] [place] [address] St:    Current Outpatient Medications:     citalopram (CELEXA) 10 MG tablet, Take 1 tablet by mouth daily, Disp: 30 tablet, Rfl: 3    estradiol (ESTRACE) 1 MG tablet, Take 1 mg by mouth daily, Disp: , Rfl:     pantoprazole (PROTONIX) 20 MG tablet, Take 2 tablets by mouth daily, Disp: 30 tablet, Rfl: 0    busPIRone (BUSPAR) 10 MG tablet, Take 10 mg by mouth daily , Disp: , Rfl:     ALLERGIES:   Allergies   Allergen Reactions    Latex Itching    Reglan [Metoclopramide] Anxiety       FAMILY HISTORY:       Problem Relation Age of Onset    Diabetes Mother     Cancer Paternal Uncle         stomach    Cancer Paternal Grandmother         breast, stomach    Diabetes Paternal Grandmother     High Blood Pressure Paternal Grandfather          SOCIAL HISTORY:   Social History     Socioeconomic History    Marital status:      Spouse name: Not on file    Number of children: Not on file    Years of education: Not on file    Highest education level: Not on file   Occupational History    Not on file   Tobacco Use    Smoking status: Never Smoker    Smokeless tobacco: Never Used   Vaping Use    Vaping Use: Never used   Substance and Sexual Activity    Alcohol use: No    Drug use: No    Sexual activity: Not on file   Other Topics Concern    Not on file   Social History Narrative    Not on file     Social Determinants of Health     Financial Resource Strain:     Difficulty of Paying Living Expenses:    Food Insecurity:     Worried About Running Out of Food in the Last Year:     Ran Out of Food in the Last Year:    Transportation Needs:     Lack of Transportation (Medical):      Lack of Transportation (Non-Medical):    Physical Activity:     Days of Exercise per Week:     Minutes of Exercise per Session:    Stress:     Feeling of Stress :    Social Connections:     Frequency of Communication with Friends and Family:     Frequency of Social Gatherings with Friends and Family:     Attends Zoroastrianism Services:     Active Member of Clubs or Organizations:     Attends Club or Organization Meetings:     Marital Status:    Intimate Partner Violence:     Fear of Current or Ex-Partner:     Emotionally Abused:     Physically Abused:     Sexually Abused:        REVIEW OF SYSTEMS:       Review of Systems   Constitutional: Positive for appetite change and fatigue. Negative for unexpected weight change. HENT: Negative for dental problem, sore throat, trouble swallowing and voice change. Eyes: Positive for visual disturbance (glasses\). Respiratory: Negative for cough, choking and shortness of breath. Cardiovascular: Negative for chest pain and leg swelling. Gastrointestinal: Positive for abdominal distention, abdominal pain, blood in stool, constipation and nausea. Negative for anal bleeding, diarrhea, rectal pain and vomiting. Genitourinary: Negative for difficulty urinating. Musculoskeletal: Positive for back pain. Negative for joint swelling and myalgias. Neurological: Positive for dizziness and headaches. Negative for tremors, weakness, light-headedness and numbness. Hematological: Does not bruise/bleed easily. Psychiatric/Behavioral: Positive for sleep disturbance. The patient is nervous/anxious. PHYSICAL EXAMINATION: Vital signs reviewed per the nursing documentation. /77   Pulse 98   Temp 97.5 °F (36.4 °C)   Ht 5' 6\" (1.676 m)   Wt 153 lb 3.2 oz (69.5 kg)   LMP 07/01/2016   SpO2 97%   BMI 24.73 kg/m²   Body mass index is 24.73 kg/m².    Physical Exam      LABORATORY DATA: Reviewed  Lab Results   Component Value Date    WBC 3.5 05/07/2021    HGB 10.1 (L) 05/07/2021    HCT 32.1 (L) 05/07/2021 Unfortunately this leads to occasional typographical errors. Please contact our office if you have any questions. I have reviewed and agree with the MA/SANTIN ROS.      Vineet Thompson MD, [facility]  Board Certified in Gastroenterology and [address] [place] [address] Gastroenterology  Office #: [phone]

## 2021-06-15 ENCOUNTER — TELEPHONE (OUTPATIENT)
Dept: GASTROENTEROLOGY | Age: 36
End: 2021-06-15

## 2021-06-15 NOTE — TELEPHONE ENCOUNTER
Patient was seen in the office on 6/9/21 and stated she told Dr. Natalie Parisi that the zofran is not working for her nausea. Doctor advise her not to take the medication. Patient left a message on the writer's voice mail on 6/15/21 stating that she is having a bad day today with nausea and was requesting if doctor can give her something else to help with the nausea. The writer will consult with the doctor and call the patient back with doctor's recommendations.

## 2021-06-22 ENCOUNTER — HOSPITAL ENCOUNTER (OUTPATIENT)
Age: 36
Discharge: HOME | End: 2021-06-22
Payer: MEDICARE

## 2021-06-22 DIAGNOSIS — K25.3 ANTRAL ULCER, ACUTE: ICD-10-CM

## 2021-06-22 DIAGNOSIS — D64.9 ANEMIA, UNSPECIFIED TYPE: ICD-10-CM

## 2021-06-22 LAB
ABSOLUTE RETIC #: 0.07 M/UL (ref 0.03–0.08)
CERULOPLASMIN: 28 MG/DL (ref 16–45)
CHOLESTEROL/HDL RATIO: 4.7
CHOLESTEROL: 154 MG/DL
ESTIMATED AVERAGE GLUCOSE: 91 MG/DL
FOLATE: 19.7 NG/ML
HAV IGM SER IA-ACNC: NONREACTIVE
HBA1C MFR BLD: 4.8 % (ref 4–6)
HDLC SERPL-MCNC: 33 MG/DL
HEPATITIS B CORE IGM ANTIBODY: NONREACTIVE
HEPATITIS B SURFACE ANTIGEN: NONREACTIVE
HEPATITIS C ANTIBODY: NONREACTIVE
IMMATURE RETIC FRACT: 9.4 % (ref 2.7–18.3)
IRON SATURATION: 36 % (ref 20–55)
IRON: 112 UG/DL (ref 37–145)
LDL CHOLESTEROL: 96 MG/DL (ref 0–130)
RETIC %: 1.6 % (ref 0.5–1.9)
RETIC HEMOGLOBIN: 30.6 PG (ref 28.2–35.7)
TOTAL IRON BINDING CAPACITY: 308 UG/DL (ref 250–450)
TRIGL SERPL-MCNC: 126 MG/DL
UNSATURATED IRON BINDING CAPACITY: 196 UG/DL (ref 112–347)
VITAMIN B-12: 1078 PG/ML (ref 232–1245)
VLDLC SERPL CALC-MCNC: ABNORMAL MG/DL (ref 1–30)

## 2021-06-22 PROCEDURE — 36415 COLL VENOUS BLD VENIPUNCTURE: CPT

## 2021-06-22 PROCEDURE — 82103 ALPHA-1-ANTITRYPSIN TOTAL: CPT

## 2021-06-22 PROCEDURE — 82390 ASSAY OF CERULOPLASMIN: CPT

## 2021-06-22 PROCEDURE — 83516 IMMUNOASSAY NONANTIBODY: CPT

## 2021-06-22 PROCEDURE — 80074 ACUTE HEPATITIS PANEL: CPT

## 2021-06-22 PROCEDURE — 86038 ANTINUCLEAR ANTIBODIES: CPT

## 2021-06-22 PROCEDURE — 83550 IRON BINDING TEST: CPT

## 2021-06-22 PROCEDURE — 80061 LIPID PANEL: CPT

## 2021-06-22 PROCEDURE — 82746 ASSAY OF FOLIC ACID SERUM: CPT

## 2021-06-22 PROCEDURE — 83036 HEMOGLOBIN GLYCOSYLATED A1C: CPT

## 2021-06-22 PROCEDURE — 86225 DNA ANTIBODY NATIVE: CPT

## 2021-06-22 PROCEDURE — 82607 VITAMIN B-12: CPT

## 2021-06-22 PROCEDURE — 82104 ALPHA-1-ANTITRYPSIN PHENO: CPT

## 2021-06-22 PROCEDURE — 83540 ASSAY OF IRON: CPT

## 2021-06-22 PROCEDURE — 85045 AUTOMATED RETICULOCYTE COUNT: CPT

## 2021-06-23 LAB
ANTI DNA DOUBLE STRANDED: 1.3 IU/ML
ANTI-NUCLEAR ANTIBODY (ANA): NEGATIVE
ENA ANTIBODIES SCREEN: 0.2 U/ML
MITOCHONDRIAL ANTIBODY: 1.1 U/ML (ref 0–4)
TISSUE TRANSGLUTAMINASE IGA: 0.6 U/ML

## 2021-06-24 ENCOUNTER — TELEPHONE (OUTPATIENT)
Dept: GASTROENTEROLOGY | Age: 36
End: 2021-06-24

## 2021-06-24 DIAGNOSIS — R10.13 EPIGASTRIC PAIN: Primary | ICD-10-CM

## 2021-06-24 LAB
ALPHA-1 ANTITRYPSIN PHENOTYPE: NORMAL
ALPHA-1 ANTITRYPSIN: 134 MG/DL (ref 90–200)

## 2021-06-24 NOTE — TELEPHONE ENCOUNTER
Per Naz Guillaume, Dr Ethan Calvo wants pt to have EGD after 7/10/21. Pt already scheduled for EGD on 7/19/21. Contacted pt via phone and spoke to her regarding the procedure. Pt asking why she needs to see PCP for nausea; pt advised that Dr Ethan Calvo stated there is nothing else that our office can give for nausea.

## 2021-07-07 ENCOUNTER — HOSPITAL ENCOUNTER (OUTPATIENT)
Dept: PREADMISSION TESTING | Age: 36
Discharge: HOME | End: 2021-07-11
Payer: MEDICARE

## 2021-07-07 VITALS — BODY MASS INDEX: 24.11 KG/M2 | HEIGHT: 66 IN | WEIGHT: 150 LBS

## 2021-07-07 NOTE — PROGRESS NOTES

## 2021-07-26 NOTE — TELEPHONE ENCOUNTER
Patient sent message via Maranda Frank noting he canceled his EGD and needs to r/s. Writer forwarding to procedure schedulers.

## 2021-07-27 NOTE — TELEPHONE ENCOUNTER
Pt now scheduled for STC, Dr Waldo Hdz, 8/3/21 at [address] [place] [address], [facility], PAT completed on 7/7/21. Prep given to pt over phone.

## 2021-08-02 ENCOUNTER — ANESTHESIA EVENT (OUTPATIENT)
Dept: ENDOSCOPY | Age: 36
End: 2021-08-02
Payer: MEDICARE

## 2021-08-03 ENCOUNTER — ANESTHESIA (OUTPATIENT)
Dept: ENDOSCOPY | Age: 36
End: 2021-08-03
Payer: MEDICARE

## 2021-08-03 ENCOUNTER — HOSPITAL ENCOUNTER (OUTPATIENT)
Age: 36
Setting detail: OUTPATIENT SURGERY
Discharge: HOME | End: 2021-08-03
Attending: INTERNAL MEDICINE | Admitting: INTERNAL MEDICINE
Payer: MEDICARE

## 2021-08-03 VITALS
DIASTOLIC BLOOD PRESSURE: 85 MMHG | RESPIRATION RATE: 13 BRPM | SYSTOLIC BLOOD PRESSURE: 116 MMHG | BODY MASS INDEX: 24.11 KG/M2 | OXYGEN SATURATION: 99 % | HEART RATE: 70 BPM | HEIGHT: 66 IN | TEMPERATURE: 97.5 F | WEIGHT: 150 LBS

## 2021-08-03 VITALS — OXYGEN SATURATION: 100 % | DIASTOLIC BLOOD PRESSURE: 75 MMHG | SYSTOLIC BLOOD PRESSURE: 112 MMHG

## 2021-08-03 PROCEDURE — 2500000003 HC RX 250 WO HCPCS: Performed by: NURSE ANESTHETIST, CERTIFIED REGISTERED

## 2021-08-03 PROCEDURE — 3700000001 HC ADD 15 MINUTES (ANESTHESIA): Performed by: INTERNAL MEDICINE

## 2021-08-03 PROCEDURE — 6360000002 HC RX W HCPCS: Performed by: NURSE ANESTHETIST, CERTIFIED REGISTERED

## 2021-08-03 PROCEDURE — 2580000003 HC RX 258: Performed by: ANESTHESIOLOGY

## 2021-08-03 PROCEDURE — 3609012400 HC EGD TRANSORAL BIOPSY SINGLE/MULTIPLE: Performed by: INTERNAL MEDICINE

## 2021-08-03 PROCEDURE — 6370000000 HC RX 637 (ALT 250 FOR IP): Performed by: ANESTHESIOLOGY

## 2021-08-03 PROCEDURE — 88305 TISSUE EXAM BY PATHOLOGIST: CPT

## 2021-08-03 PROCEDURE — 7100000001 HC PACU RECOVERY - ADDTL 15 MIN: Performed by: INTERNAL MEDICINE

## 2021-08-03 PROCEDURE — 6360000002 HC RX W HCPCS: Performed by: ANESTHESIOLOGY

## 2021-08-03 PROCEDURE — 3700000000 HC ANESTHESIA ATTENDED CARE: Performed by: INTERNAL MEDICINE

## 2021-08-03 PROCEDURE — 2709999900 HC NON-CHARGEABLE SUPPLY: Performed by: INTERNAL MEDICINE

## 2021-08-03 PROCEDURE — 7100000000 HC PACU RECOVERY - FIRST 15 MIN: Performed by: INTERNAL MEDICINE

## 2021-08-03 PROCEDURE — 43239 EGD BIOPSY SINGLE/MULTIPLE: CPT | Performed by: INTERNAL MEDICINE

## 2021-08-03 RX ORDER — HYDROCODONE BITARTRATE AND ACETAMINOPHEN 5; 325 MG/1; MG/1
2 TABLET ORAL PRN
Status: DISCONTINUED | OUTPATIENT
Start: 2021-08-03 | End: 2021-08-03 | Stop reason: HOSPADM

## 2021-08-03 RX ORDER — PROPOFOL 10 MG/ML
INJECTION, EMULSION INTRAVENOUS PRN
Status: DISCONTINUED | OUTPATIENT
Start: 2021-08-03 | End: 2021-08-03 | Stop reason: SDUPTHER

## 2021-08-03 RX ORDER — MORPHINE SULFATE 2 MG/ML
2 INJECTION, SOLUTION INTRAMUSCULAR; INTRAVENOUS EVERY 5 MIN PRN
Status: DISCONTINUED | OUTPATIENT
Start: 2021-08-03 | End: 2021-08-03 | Stop reason: HOSPADM

## 2021-08-03 RX ORDER — FENTANYL CITRATE 50 UG/ML
25 INJECTION, SOLUTION INTRAMUSCULAR; INTRAVENOUS EVERY 5 MIN PRN
Status: DISCONTINUED | OUTPATIENT
Start: 2021-08-03 | End: 2021-08-03 | Stop reason: HOSPADM

## 2021-08-03 RX ORDER — SODIUM CHLORIDE, SODIUM LACTATE, POTASSIUM CHLORIDE, CALCIUM CHLORIDE 600; 310; 30; 20 MG/100ML; MG/100ML; MG/100ML; MG/100ML
INJECTION, SOLUTION INTRAVENOUS CONTINUOUS
Status: DISCONTINUED | OUTPATIENT
Start: 2021-08-03 | End: 2021-08-03 | Stop reason: HOSPADM

## 2021-08-03 RX ORDER — SCOLOPAMINE TRANSDERMAL SYSTEM 1 MG/1
1 PATCH, EXTENDED RELEASE TRANSDERMAL
Status: DISCONTINUED | OUTPATIENT
Start: 2021-08-03 | End: 2021-08-03 | Stop reason: HOSPADM

## 2021-08-03 RX ORDER — SODIUM CHLORIDE 0.9 % (FLUSH) 0.9 %
10 SYRINGE (ML) INJECTION PRN
Status: DISCONTINUED | OUTPATIENT
Start: 2021-08-03 | End: 2021-08-03 | Stop reason: HOSPADM

## 2021-08-03 RX ORDER — SODIUM CHLORIDE 9 MG/ML
25 INJECTION, SOLUTION INTRAVENOUS PRN
Status: CANCELLED | OUTPATIENT
Start: 2021-08-03

## 2021-08-03 RX ORDER — SODIUM CHLORIDE 9 MG/ML
25 INJECTION, SOLUTION INTRAVENOUS PRN
Status: DISCONTINUED | OUTPATIENT
Start: 2021-08-03 | End: 2021-08-03 | Stop reason: HOSPADM

## 2021-08-03 RX ORDER — LIDOCAINE HYDROCHLORIDE 10 MG/ML
INJECTION, SOLUTION EPIDURAL; INFILTRATION; INTRACAUDAL; PERINEURAL PRN
Status: DISCONTINUED | OUTPATIENT
Start: 2021-08-03 | End: 2021-08-03 | Stop reason: SDUPTHER

## 2021-08-03 RX ORDER — ONDANSETRON 2 MG/ML
4 INJECTION INTRAMUSCULAR; INTRAVENOUS
Status: DISCONTINUED | OUTPATIENT
Start: 2021-08-03 | End: 2021-08-03 | Stop reason: HOSPADM

## 2021-08-03 RX ORDER — HYDROCODONE BITARTRATE AND ACETAMINOPHEN 5; 325 MG/1; MG/1
1 TABLET ORAL PRN
Status: DISCONTINUED | OUTPATIENT
Start: 2021-08-03 | End: 2021-08-03 | Stop reason: HOSPADM

## 2021-08-03 RX ORDER — FENTANYL CITRATE 50 UG/ML
50 INJECTION, SOLUTION INTRAMUSCULAR; INTRAVENOUS EVERY 5 MIN PRN
Status: DISCONTINUED | OUTPATIENT
Start: 2021-08-03 | End: 2021-08-03 | Stop reason: HOSPADM

## 2021-08-03 RX ORDER — MEPERIDINE HYDROCHLORIDE 25 MG/ML
12.5 INJECTION INTRAMUSCULAR; INTRAVENOUS; SUBCUTANEOUS EVERY 5 MIN PRN
Status: DISCONTINUED | OUTPATIENT
Start: 2021-08-03 | End: 2021-08-03 | Stop reason: HOSPADM

## 2021-08-03 RX ORDER — HYDRALAZINE HYDROCHLORIDE 20 MG/ML
5 INJECTION INTRAMUSCULAR; INTRAVENOUS EVERY 10 MIN PRN
Status: DISCONTINUED | OUTPATIENT
Start: 2021-08-03 | End: 2021-08-03 | Stop reason: HOSPADM

## 2021-08-03 RX ORDER — ONDANSETRON 2 MG/ML
INJECTION INTRAMUSCULAR; INTRAVENOUS PRN
Status: DISCONTINUED | OUTPATIENT
Start: 2021-08-03 | End: 2021-08-03 | Stop reason: SDUPTHER

## 2021-08-03 RX ORDER — LABETALOL HYDROCHLORIDE 5 MG/ML
5 INJECTION, SOLUTION INTRAVENOUS EVERY 10 MIN PRN
Status: DISCONTINUED | OUTPATIENT
Start: 2021-08-03 | End: 2021-08-03 | Stop reason: HOSPADM

## 2021-08-03 RX ORDER — DIPHENHYDRAMINE HYDROCHLORIDE 50 MG/ML
12.5 INJECTION INTRAMUSCULAR; INTRAVENOUS
Status: COMPLETED | OUTPATIENT
Start: 2021-08-03 | End: 2021-08-03

## 2021-08-03 RX ORDER — SODIUM CHLORIDE 0.9 % (FLUSH) 0.9 %
5-40 SYRINGE (ML) INJECTION PRN
Status: CANCELLED | OUTPATIENT
Start: 2021-08-03

## 2021-08-03 RX ORDER — SODIUM CHLORIDE 0.9 % (FLUSH) 0.9 %
5-40 SYRINGE (ML) INJECTION EVERY 12 HOURS SCHEDULED
Status: CANCELLED | OUTPATIENT
Start: 2021-08-03

## 2021-08-03 RX ADMIN — SODIUM CHLORIDE, POTASSIUM CHLORIDE, SODIUM LACTATE AND CALCIUM CHLORIDE: 600; 310; 30; 20 INJECTION, SOLUTION INTRAVENOUS at 08:26

## 2021-08-03 RX ADMIN — PROPOFOL 230 MG: 10 INJECTION, EMULSION INTRAVENOUS at 09:48

## 2021-08-03 RX ADMIN — ONDANSETRON 4 MG: 2 INJECTION INTRAMUSCULAR; INTRAVENOUS at 09:40

## 2021-08-03 RX ADMIN — LIDOCAINE HYDROCHLORIDE 50 MG: 10 INJECTION, SOLUTION EPIDURAL; INFILTRATION; INTRACAUDAL; PERINEURAL at 09:48

## 2021-08-03 RX ADMIN — DIPHENHYDRAMINE HYDROCHLORIDE 12.5 MG: 50 INJECTION INTRAMUSCULAR; INTRAVENOUS at 10:36

## 2021-08-03 ASSESSMENT — PULMONARY FUNCTION TESTS
PIF_VALUE: 1

## 2021-08-03 ASSESSMENT — PAIN SCALES - GENERAL
PAINLEVEL_OUTOF10: 0

## 2021-08-03 ASSESSMENT — PAIN - FUNCTIONAL ASSESSMENT: PAIN_FUNCTIONAL_ASSESSMENT: 0-10

## 2021-08-03 ASSESSMENT — ENCOUNTER SYMPTOMS: STRIDOR: 0

## 2021-08-03 NOTE — ANESTHESIA PRE PROCEDURE
Department of Anesthesiology  Preprocedure Note       Name:  Sameer Greene   Age:  38 y.o.  :  1985                                          MRN:  [id number]         Date:  8/3/2021      Surgeon: Stevan Baker):  Raúl Martínez MD    Procedure: Procedure(s):  EGD    Medications prior to admission:   Prior to Admission medications    Medication Sig Start Date End Date Taking? Authorizing Provider   citalopram (CELEXA) 10 MG tablet Take 1 tablet by mouth daily 21  Yes Raúl Martínez MD   estradiol (ESTRACE) 1 MG tablet Take 1 mg by mouth daily   Yes Historical Provider, MD   pantoprazole (PROTONIX) 20 MG tablet Take 2 tablets by mouth daily 21  Yes Daniel Rodriguez MD       Current medications:    Current Facility-Administered Medications   Medication Dose Route Frequency Provider Last Rate Last Admin    lactated ringers infusion   Intravenous Continuous [place] MD Robbie 125 mL/hr at 21 0826 New Bag at 21 0826    sodium chloride flush 0.9 % injection 10 mL  10 mL Intravenous PRN Eladia Avalos MD        0.9 % sodium chloride infusion  25 mL Intravenous PRN Devon Ingram MD           Allergies:     Allergies   Allergen Reactions    Latex Itching    Reglan [Metoclopramide] Anxiety       Problem List:    Patient Active Problem List   Diagnosis Code    High-risk pregnancy O09.90    Epigastric pain R10.13    Nausea R11.0    Bloating R14.0    Generalized abdominal pain R10.84    Right upper quadrant pain R10.11    Abdominal pain, right upper quadrant R10.11    Other irritable bowel syndrome K58.8    Anxiety F41.9    Abdominal pain R10.9       Past Medical History:        Diagnosis Date    Anxiety     GERD (gastroesophageal reflux disease)     Nausea     PONV (postoperative nausea and vomiting)     Ulcer of gastric fundus        Past Surgical History:        Procedure Laterality Date    CLEFT LIP REPAIR      COSMETIC SURGERY      rinoplasty    HYSTERECTOMY Value Date    PROTIME 10.6 05/19/2017    INR 1.0 05/19/2017    APTT 29.9 05/19/2017       HCG (If Applicable):   Lab Results   Component Value Date    HCG NEGATIVE 05/05/2021        ABGs: No results found for: PHART, PO2ART, BLS1GNS, UKB2WQX, BEART, W8KDMJOX     Type & Screen (If Applicable):  No results found for: LABABO, LABRH    Drug/Infectious Status (If Applicable):  Lab Results   Component Value Date    HEPCAB NONREACTIVE 06/22/2021       COVID-19 Screening (If Applicable):   Lab Results   Component Value Date    COVID19 Not Detected 05/07/2021           Anesthesia Evaluation  Patient summary reviewed and Nursing notes reviewed   history of anesthetic complications: PONV. Airway: Mallampati: II  TM distance: >3 FB   Neck ROM: full  Mouth opening: > = 3 FB Dental: normal exam         Pulmonary:Negative Pulmonary ROS breath sounds clear to auscultation      (-) rhonchi, wheezes, rales, stridor and no decreased breath sounds                           Cardiovascular:        (-) murmur, weak pulses,  friction rub, systolic click, carotid bruit,  JVD and peripheral edema      Rhythm: regular  Rate: normal                    Neuro/Psych:   Negative Neuro/Psych ROS              GI/Hepatic/Renal:   (+) GERD: well controlled, PUD,           Endo/Other:                     Abdominal:             Vascular: negative vascular ROS. Other Findings:             Anesthesia Plan      general     ASA 2       Induction: intravenous. MIPS: Prophylactic antiemetics administered. Anesthetic plan and risks discussed with patient. Plan discussed with CRNA.                   Gloria Villarreal MD   8/3/2021

## 2021-08-03 NOTE — ANESTHESIA POSTPROCEDURE EVALUATION
POST- ANESTHESIA EVALUATION       Pt Name: Robbie Gannon  MRN: [id number]  YOB: 1985  Date of evaluation: 8/3/2021  Time:  12:50 PM      /85   Pulse 70   Temp 97.5 °F (36.4 °C)   Resp 13   Ht 5' 6\" (1.676 m)   Wt 150 lb (68 kg)   LMP 07/01/2016   SpO2 99%   BMI 24.21 kg/m²      Consciousness Level  Awake  Cardiopulmonary Status  Stable  Pain Adequately Treated YES  Nausea / Vomiting  NO  Adequate Hydration  YES  Anesthesia Related Complications NONE      Electronically signed by Muriel Salazar MD on 8/3/2021 at 12:50 PM       Department of Anesthesiology  Postprocedure Note    Patient: Robbie Gannon  MRN: [id number]  YOB: 1985  Date of evaluation: 8/3/2021  Time:  12:50 PM     Procedure Summary     Date: 08/03/21 Room / Location: [address] AND [facility]    Anesthesia Start: 3247 Anesthesia Stop: 1000    Procedure: EGD BIOPSY (N/A Esophagus) Diagnosis: (EPIGASTRIC PAIN --- USE PAT FROM 7/7 (PT FULLY VACCINATED))    Surgeons: Donny Maya MD Responsible Provider: Muriel Salazar MD    Anesthesia Type: general ASA Status: 2          Anesthesia Type: general    Tu Phase I: Tu Score: 10    Tu Phase II:      Last vitals: Reviewed and per EMR flowsheets.        Anesthesia Post Evaluation

## 2021-08-03 NOTE — H&P
HISTORY and [facility] [address]       NAME:  Eduarda Tadeo  MRN: [id number]   YOB: 1985   Date: 8/3/2021   Age: 38 y.o. Gender: female       COMPLAINT AND PRESENT HISTORY:                Jack Gómez is 38 y.o.,  female, undergoing for EGD. Patient has had previous endoscopies done this year. Patient states she has a hx of stomach, pain, nausea . Patient states in her endoscopy an Ulcer was found and today getting an evaluation of healing. Patient denies any FH of Esophogeal Cancer. Patient complains of frequent heartburn, sometimes awakes one from sleep. Pt has hx of GERD and is taking Protonix. , she states that it does not. Patient reports mild  Dysphagia at times. Patient has hx of epigastric pains, with onset of years ago. That has progressively gotten worse nausea and no vomiting . Patient is on zofran and meclazine. Pt admits to GI bleed in past.      no significant  medical hx. Hx of smoking : no   Pt has hx of PONV. Pt states that the patch works well for her. No fever or chills.     PAST MEDICAL HISTORY     Past Medical History:   Diagnosis Date    Anxiety     GERD (gastroesophageal reflux disease)     Nausea     PONV (postoperative nausea and vomiting)        SURGICAL HISTORY       Past Surgical History:   Procedure Laterality Date    CLEFT LIP REPAIR      COSMETIC SURGERY      rinoplasty    HYSTERECTOMY      OVARY REMOVAL Left     TUBAL LIGATION Bilateral 20134    UPPER GASTROINTESTINAL ENDOSCOPY  12/2/15    UPPER GASTROINTESTINAL ENDOSCOPY N/A 5/7/2021    EGD BIOPSY performed by eBrnard Abbasi MD at [facility] HISTORY       Family History   Problem Relation Age of Onset    Diabetes Mother     Cancer Paternal Uncle         stomach    Cancer Paternal Grandmother         breast, stomach    Diabetes Paternal Grandmother     High Blood Pressure Paternal Grandfather        SOCIAL HISTORY Social History     Socioeconomic History    Marital status:      Spouse name: Not on file    Number of children: Not on file    Years of education: Not on file    Highest education level: Not on file   Occupational History    Not on file   Tobacco Use    Smoking status: Never Smoker    Smokeless tobacco: Never Used   Vaping Use    Vaping Use: Never used   Substance and Sexual Activity    Alcohol use: No    Drug use: No    Sexual activity: Not on file   Other Topics Concern    Not on file   Social History Narrative    Not on file     Social Determinants of Health     Financial Resource Strain:     Difficulty of Paying Living Expenses:    Food Insecurity:     Worried About Running Out of Food in the Last Year:     [address] in the Last Year:    Transportation Needs:     Lack of Transportation (Medical):  Lack of Transportation (Non-Medical):    Physical Activity:     Days of Exercise per Week:     Minutes of Exercise per Session:    Stress:     Feeling of Stress :    Social Connections:     Frequency of Communication with Friends and Family:     Frequency of Social Gatherings with Friends and Family:     Attends Rastafarian Services:     Active Member of Clubs or Organizations:     Attends Club or Organization Meetings:     Marital Status:    Intimate Partner Violence:     Fear of Current or Ex-Partner:     Emotionally Abused:     Physically Abused:     Sexually Abused:         REVIEW OF SYSTEMS      Allergies   Allergen Reactions    Latex Itching    Reglan [Metoclopramide] Anxiety       No current facility-administered medications on file prior to encounter.      Current Outpatient Medications on File Prior to Encounter   Medication Sig Dispense Refill    citalopram (CELEXA) 10 MG tablet Take 1 tablet by mouth daily 30 tablet 3    estradiol (ESTRACE) 1 MG tablet Take 1 mg by mouth daily      pantoprazole (PROTONIX) 20 MG tablet Take 2 tablets by mouth daily 30 tablet 0    busPIRone (BUSPAR) 10 MG tablet Take 10 mg by mouth daily          Negative except for what is mentioned in the HPI. GENERAL PHYSICAL EXAM     Vitals :   See vital signs in RN flow sheet. GENERAL APPEARANCE:   Jack Gómez is 38 y.o.,  female, not obese, nourished, conscious, alert. Does not appear to be distress or pain at this time. SKIN:  Warm, dry, no cyanosis or jaundice. HEAD:  Normocephalic, atraumatic, no swelling or tenderness. EYES:  Pupils equal, reactive to light. EARS:  No discharge, no marked hearing loss. NOSE:  No rhinorrhea, epistaxis or septal deformity. THROAT:  Not congested. No ulceration bleeding or discharge. NECK:  No stiffness, trachea central.  No palpable masses or L.N.                 CHEST:  Symmetrical and equal on expansion. HEART:  RRR S1 > S2. No audible murmurs or gallops. LUNGS:  Equal on expansion, normal breath sounds. No adventitious sounds. ABDOMEN:    Soft on palpation. No dysphagia, No localized tenderness. No guarding or rigidity. No palpable hepatosplenomegaly. LYMPHATICS:  No palpable cervical lymphadenopathy. LOCOMOTOR, BACK AND SPINE:  No tenderness or deformities. EXTREMITIES:  Symmetrical, no pretibial edema. Surajs sign negative. No discoloration or ulcerations. NEUROLOGIC:  The patient is conscious, alert, oriented,Cranial nerve II-XII intact, taste and smell were not examined. No apparent focal sensory or motor deficits.              PROVISIONAL DIAGNOSES / SURGERY:      EPIGASTRIC PAIN  EGD    Patient Active Problem List    Diagnosis Date Noted    Right upper quadrant pain 05/07/2021    Abdominal pain 05/07/2021    Abdominal pain, right upper quadrant     Other irritable bowel syndrome     Anxiety     Generalized abdominal pain 07/14/2016    Epigastric pain 11/24/2015    Nausea 11/24/2015    Bloating 11/24/2015    High-risk pregnancy 10/14/2013           JUAN Mae, APRN - CNP on 8/3/2021 at 8:04 AM

## 2021-08-03 NOTE — OP NOTE
ESOPHAGOGASTRODUODENOSCOPY   ( EGD )  DATE OF PROCEDURE: 8/3/2021     SURGEON: Odessa Santamaria MD    ASSISTANT: None    PREOPERATIVE DIAGNOSIS: Patient has urogenital ulcer. Also has a family history of gastric cancer. Patient is treated with PPI therapy for 2 to 3 months. Procedure for prompt evaluate healing of the ulcer, rule out malignancy. POSTOPERATIVE DIAGNOSIS: Ulcer almost healed. Inflammation at the ulcer site. OPERATION: Upper GI endoscopy with Biopsy    ANESTHESIA: MAC    ESTIMATED BLOOD LOSS: None    COMPLICATIONS: None. SPECIMENS:  Was Obtained: Biopsies taken from the ulcer site to rule out dysplasia. Also random biopsies taken from the body of the stomach to evaluate H. pylori. HISTORY: The patient is a 38y.o. year old female with history of above preop diagnosis. I recommended esophagogastroduodenoscopy with possible biopsy and I explained the risk, benefits, expected outcome, and alternatives to the procedure. Risks included but are not limited to bleeding, infection, respiratory distress, hypotension, and perforation of the esophagus, stomach, or duodenum. Patient understands and is in agreement. PROCEDURE: The patient was given IV conscious sedation. The patient's SPO2 remained above 90% throughout the procedure. Cetacaine spray given. Patient placed in left lateral position. Olympus  videogastroscope was inserted orally under vision into the esophagus without difficulty and advanced into the stomach then through the pylorus up to the second part of duodenum. Findings:    Retropharyngeal area was grossly normal appearing    Esophagus: normal    Stomach:    Fundus and Cardia Examined in Retroflexed View: normal    Body: normal    Antrum: abnormal: Just below the angularis there appears to be inflammation and a almost healed ulcer site noted. Polaroid pictures taken from this area and multiple biopsies taken from the site.     Also multiple random biopsies taken from the body of the stomach to evaluate H. pylori. Duodenum:     Descending: normal    Bulb: normal    While withdrawing the scope the above findings were verified and the scope was removed. The patient has tolerated the procedure without unusual events. Recommendations/Plan:   1. F/U Biopsies  2. F/U In Office as instructed  3.  Discussed with the family                   Electronically signed by Enrike Renteria MD  on 8/3/2021 at 9:55 AM

## 2021-08-04 LAB — SURGICAL PATHOLOGY REPORT: NORMAL

## 2021-08-30 ENCOUNTER — OFFICE VISIT (OUTPATIENT)
Dept: GASTROENTEROLOGY | Age: 36
End: 2021-08-30
Payer: MEDICARE

## 2021-08-30 VITALS
WEIGHT: 154.1 LBS | DIASTOLIC BLOOD PRESSURE: 75 MMHG | HEART RATE: 79 BPM | BODY MASS INDEX: 24.77 KG/M2 | HEIGHT: 66 IN | OXYGEN SATURATION: 98 % | TEMPERATURE: 98.2 F | SYSTOLIC BLOOD PRESSURE: 119 MMHG

## 2021-08-30 DIAGNOSIS — K76.0 FATTY LIVER: ICD-10-CM

## 2021-08-30 DIAGNOSIS — K25.3 ANTRAL ULCER, ACUTE: ICD-10-CM

## 2021-08-30 DIAGNOSIS — R11.0 NAUSEA: Primary | ICD-10-CM

## 2021-08-30 DIAGNOSIS — K58.8 OTHER IRRITABLE BOWEL SYNDROME: ICD-10-CM

## 2021-08-30 PROCEDURE — G8420 CALC BMI NORM PARAMETERS: HCPCS | Performed by: NURSE PRACTITIONER

## 2021-08-30 PROCEDURE — G8427 DOCREV CUR MEDS BY ELIG CLIN: HCPCS | Performed by: NURSE PRACTITIONER

## 2021-08-30 PROCEDURE — 1036F TOBACCO NON-USER: CPT | Performed by: NURSE PRACTITIONER

## 2021-08-30 PROCEDURE — 99213 OFFICE O/P EST LOW 20 MIN: CPT | Performed by: NURSE PRACTITIONER

## 2021-08-30 RX ORDER — AMITRIPTYLINE HYDROCHLORIDE 10 MG/1
10 TABLET, FILM COATED ORAL NIGHTLY
Qty: 90 TABLET | Refills: 1 | Status: SHIPPED | OUTPATIENT
Start: 2021-08-30 | End: 2021-09-28 | Stop reason: ALTCHOICE

## 2021-08-30 ASSESSMENT — ENCOUNTER SYMPTOMS
ANAL BLEEDING: 0
BACK PAIN: 1
CHOKING: 0
SORE THROAT: 0
DIARRHEA: 0
ABDOMINAL PAIN: 0
COUGH: 0
VOICE CHANGE: 0
VOMITING: 0
NAUSEA: 1
CONSTIPATION: 0
SHORTNESS OF BREATH: 0
RECTAL PAIN: 0
ABDOMINAL DISTENTION: 1
TROUBLE SWALLOWING: 0
BLOOD IN STOOL: 0

## 2021-08-30 NOTE — PROGRESS NOTES
GI CLINIC FOLLOW UP    INTERVAL HISTORY:   No referring provider defined for this encounter. Chief Complaint   Patient presents with    Follow-up     Patient is her today to f/u on 8/3/21 EGD       HISTORY OF PRESENT ILLNESS:     Patient being seen for follow-up EGD. Patient has hx of antral ulcer. Initial EGD May 2021. Repeat EGD revealed a healed ulcer. Minimal inflammation seen. Biopsies obtained. Negative for dysplasia. Also random biopsies taken from the body of the stomach to evaluate H. pylori. These were negative. Patient denies any significant heartburns. Does complain of nausea, chronic. No aggravating or alleviating factors. Patient does report chronic headaches but rarely has migraines. Patient has history of IBS. His constipation with intermittent loose bowels. No melena, hematochezia. Has history of mild anemia, improved. History of hysterectomy. Known fatty liver    Past Medical,Family, and Social History reviewed and does contribute to the patient presentingcondition. Patient's PMH/PSH,SH,PSYCH Hx, MEDs, ALLERGIES, and ROS were all reviewed and updated in the appropriate sections.     PAST MEDICAL HISTORY:  Past Medical History:   Diagnosis Date    Anxiety     GERD (gastroesophageal reflux disease)     Nausea     PONV (postoperative nausea and vomiting)     Ulcer of gastric fundus        Past Surgical History:   Procedure Laterality Date    CLEFT LIP REPAIR      COSMETIC SURGERY      rinoplasty    HYSTERECTOMY      OVARY REMOVAL Left     TUBAL LIGATION Bilateral 20134    UPPER GASTROINTESTINAL ENDOSCOPY  12/2/15    UPPER GASTROINTESTINAL ENDOSCOPY N/A 5/7/2021    EGD BIOPSY performed by Rui Abernathy MD at [facility] N/A 8/3/2021    EGD BIOPSY performed by Dav Wilburn MD at [address]:    Current Outpatient Medications:     amitriptyline (ELAVIL) 10 MG tablet, Take 1 tablet by mouth nightly, Disp: 90 tablet, Rfl: 1    estradiol (ESTRACE) 1 MG tablet, Take 1 mg by mouth daily, Disp: , Rfl:     pantoprazole (PROTONIX) 20 MG tablet, Take 2 tablets by mouth daily, Disp: 30 tablet, Rfl: 0    ALLERGIES:   Allergies   Allergen Reactions    Latex Itching    Reglan [Metoclopramide] Anxiety       FAMILY HISTORY:       Problem Relation Age of Onset    Diabetes Mother     Cancer Paternal Uncle         stomach    Cancer Paternal Grandmother         breast, stomach    Diabetes Paternal Grandmother     High Blood Pressure Paternal Grandfather          SOCIAL HISTORY:   Social History     Socioeconomic History    Marital status:      Spouse name: Not on file    Number of children: Not on file    Years of education: Not on file    Highest education level: Not on file   Occupational History    Not on file   Tobacco Use    Smoking status: Never Smoker    Smokeless tobacco: Never Used   Vaping Use    Vaping Use: Never used   Substance and Sexual Activity    Alcohol use: No    Drug use: No    Sexual activity: Not on file   Other Topics Concern    Not on file   Social History Narrative    Not on file     Social Determinants of Health     Financial Resource Strain:     Difficulty of Paying Living Expenses:    Food Insecurity:     Worried About Running Out of Food in the Last Year:     Ran Out of Food in the Last Year:    Transportation Needs:     Lack of Transportation (Medical):      Lack of Transportation (Non-Medical):    Physical Activity:     Days of Exercise per Week:     Minutes of Exercise per Session:    Stress:     Feeling of Stress :    Social Connections:     Frequency of Communication with Friends and Family:     Frequency of Social Gatherings with Friends and Family:     Attends Orthodox Services:     Active Member of Clubs or Organizations:     Attends Club or Organization Meetings:     Marital Status:    Intimate Partner Violence:     Fear of Current or Ex-Partner:     Emotionally Abused:     Physically Abused:     Sexually Abused:        REVIEW OF SYSTEMS: A 12-point review of systemswas obtained and pertinent positives and negatives were enumerated above in the history of present illness. All other reviewed systems / symptoms were negative. Review of Systems   Constitutional: Positive for appetite change and fatigue. Negative for unexpected weight change. HENT: Negative for dental problem, sore throat, trouble swallowing and voice change. Eyes: Positive for visual disturbance (glasses\). Respiratory: Negative for cough, choking and shortness of breath. Cardiovascular: Negative for chest pain and leg swelling. Gastrointestinal: Positive for abdominal distention and nausea. Negative for abdominal pain, anal bleeding, blood in stool, constipation, diarrhea, rectal pain and vomiting. Genitourinary: Negative for difficulty urinating. Musculoskeletal: Positive for back pain. Negative for joint swelling and myalgias. Neurological: Positive for headaches. Negative for dizziness, tremors, weakness, light-headedness and numbness. Hematological: Does not bruise/bleed easily. Psychiatric/Behavioral: Positive for sleep disturbance. The patient is nervous/anxious. PHYSICAL EXAMINATION: Vital signs reviewed per the nursing documentation. /75   Pulse 79   Temp 98.2 °F (36.8 °C)   Ht 5' 6\" (1.676 m)   Wt 154 lb 1.6 oz (69.9 kg)   LMP 07/01/2016   SpO2 98%   BMI 24.87 kg/m²   Body mass index is 24.87 kg/m². Physical Exam  Constitutional:       Appearance: Normal appearance. Eyes:      General: No scleral icterus. Pupils: Pupils are equal, round, and reactive to light. Cardiovascular:      Rate and Rhythm: Normal rate and regular rhythm. Heart sounds: Normal heart sounds. Pulmonary:      Effort: Pulmonary effort is normal.      Breath sounds: Normal breath sounds.    Abdominal:      General: Bowel sounds are normal. There is no distension. Palpations: Abdomen is soft. There is no mass. Tenderness: There is no abdominal tenderness. There is no guarding. Skin:     General: Skin is warm and dry. Coloration: Skin is not jaundiced. Neurological:      Mental Status: She is alert and oriented to person, place, and time. Mental status is at baseline. LABORATORY DATA: Reviewed  Lab Results   Component Value Date    WBC 3.5 05/07/2021    HGB 10.1 (L) 05/07/2021    HCT 32.1 (L) 05/07/2021    MCV 86.8 05/07/2021     05/07/2021     05/07/2021    K 4.1 05/07/2021     05/07/2021    CO2 22 05/07/2021    BUN 10 05/07/2021    CREATININE 0.76 05/07/2021    LABALBU 4.2 05/07/2021    BILITOT 0.23 (L) 05/07/2021    ALKPHOS 91 05/07/2021    AST 23 05/07/2021    ALT 17 05/07/2021    INR 1.0 05/19/2017         Lab Results   Component Value Date    RBC 3.70 (L) 05/07/2021    HGB 10.1 (L) 05/07/2021    MCV 86.8 05/07/2021    MCH 27.3 05/07/2021    MCHC 31.5 05/07/2021    RDW 11.8 05/07/2021    MPV 9.8 05/07/2021    BASOPCT 1 05/07/2021    LYMPHSABS 2.62 05/07/2021    MONOSABS 0.38 05/07/2021    NEUTROABS 1.15 (L) 05/07/2021    EOSABS 0.16 05/07/2021    BASOSABS 0.04 05/07/2021         DIAGNOSTIC TESTING:     No results found. IMPRESSION: Ms. Kinga Meek is a 38 y.o. female with    Diagnosis Orders   1. Nausea  amitriptyline (ELAVIL) 10 MG tablet   2. Antral ulcer, acute     3. Other irritable bowel syndrome     4. Fatty liver       EGD reviewed with patient. Mild inflammation seen. Ulcer nearly healed. Bx negative for dysplasia, H. Pylori. Continue PPI another 4 weeks. Follow antireflux measures. Patient continues to have nausea. Reports chronic headaches without migraines. No hx of DM, sx of bowel obstruction, POTS, etc.  Trial amitriptyline. Lifestyle/dietary modifications discussed.     Discussed with the patient regarding nature history of fatty liver disease and risk of progression to cirrhosis and complications. Strongly encouraged active lifestyle and daily exercise. Follow-up 6 weeks. Thank you for allowing me to participate in the care of Ms. Gómez. For any further questions please do not hesitate to contact me. I have reviewed and agree with the ROS entered by the MA/SANTIN.          BASILIA Booker    [facility]  Office #: [phone]

## 2021-09-13 ENCOUNTER — PATIENT MESSAGE (OUTPATIENT)
Dept: GASTROENTEROLOGY | Age: 36
End: 2021-09-13

## 2021-09-13 RX ORDER — PANTOPRAZOLE SODIUM 20 MG/1
40 TABLET, DELAYED RELEASE ORAL DAILY
Qty: 30 TABLET | Refills: 0 | Status: SHIPPED | OUTPATIENT
Start: 2021-09-13 | End: 2021-11-03 | Stop reason: SDUPTHER

## 2021-09-13 NOTE — TELEPHONE ENCOUNTER
From: Jack Gómez  To: ALEX Lorenzo NP  Sent: 9/13/2021 11:23 AM EDT  Subject: Prescription Question    Hi could i have this filled?  pantoprazole 20 MG tablet  Thanks

## 2021-09-27 ENCOUNTER — PATIENT MESSAGE (OUTPATIENT)
Dept: GASTROENTEROLOGY | Age: 36
End: 2021-09-27

## 2021-09-28 RX ORDER — CITALOPRAM 10 MG/1
10 TABLET ORAL DAILY
Qty: 90 TABLET | Refills: 1 | Status: SHIPPED | OUTPATIENT
Start: 2021-09-28

## 2021-09-28 NOTE — TELEPHONE ENCOUNTER
From: Jack Gómez  To: Reyes Brothers, ALEX Phillip NP  Sent: 9/27/2021 9:00 AM EDT  Subject: Prescription Question    Good Morning, you prescribed me a Rx at my last visit that I never started taking.  Could I just stick with the Citalopram? If so could I be called in

## 2021-11-03 RX ORDER — PANTOPRAZOLE SODIUM 20 MG/1
40 TABLET, DELAYED RELEASE ORAL DAILY
Qty: 30 TABLET | Refills: 2 | Status: SHIPPED | OUTPATIENT
Start: 2021-11-03

## 2022-07-01 ENCOUNTER — HOSPITAL ENCOUNTER (EMERGENCY)
Age: 37
Discharge: HOME | End: 2022-07-02
Attending: EMERGENCY MEDICINE
Payer: COMMERCIAL

## 2022-07-01 VITALS
WEIGHT: 163 LBS | RESPIRATION RATE: 18 BRPM | HEART RATE: 95 BPM | OXYGEN SATURATION: 97 % | TEMPERATURE: 97.3 F | BODY MASS INDEX: 26.2 KG/M2 | SYSTOLIC BLOOD PRESSURE: 140 MMHG | HEIGHT: 66 IN | DIASTOLIC BLOOD PRESSURE: 81 MMHG
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
## 2022-07-01 PROCEDURE — 93005 ELECTROCARDIOGRAM TRACING: CPT

## 2022-07-01 PROCEDURE — 6360000002 HC RX W HCPCS: Performed by: STUDENT IN AN ORGANIZED HEALTH CARE EDUCATION/TRAINING PROGRAM

## 2022-07-01 PROCEDURE — 6370000000 HC RX 637 (ALT 250 FOR IP): Performed by: STUDENT IN AN ORGANIZED HEALTH CARE EDUCATION/TRAINING PROGRAM

## 2022-07-01 PROCEDURE — 99284 EMERGENCY DEPT VISIT MOD MDM: CPT

## 2022-07-01 PROCEDURE — 96372 THER/PROPH/DIAG INJ SC/IM: CPT

## 2022-07-01 PROCEDURE — 6370000000 HC RX 637 (ALT 250 FOR IP): Performed by: EMERGENCY MEDICINE

## 2022-07-01 RX ORDER — DIPHENHYDRAMINE HCL 25 MG
25 TABLET ORAL ONCE
Status: COMPLETED | OUTPATIENT
Start: 2022-07-01 | End: 2022-07-01

## 2022-07-01 RX ORDER — IBUPROFEN 600 MG/1
600 TABLET ORAL EVERY 6 HOURS PRN
Qty: 20 TABLET | Refills: 0 | Status: SHIPPED | OUTPATIENT
Start: 2022-07-01

## 2022-07-01 RX ORDER — KETOROLAC TROMETHAMINE 30 MG/ML
30 INJECTION, SOLUTION INTRAMUSCULAR; INTRAVENOUS ONCE
Status: COMPLETED | OUTPATIENT
Start: 2022-07-01 | End: 2022-07-01

## 2022-07-01 RX ORDER — ONDANSETRON 4 MG/1
4 TABLET, ORALLY DISINTEGRATING ORAL ONCE
Status: COMPLETED | OUTPATIENT
Start: 2022-07-01 | End: 2022-07-01

## 2022-07-01 RX ORDER — PROCHLORPERAZINE EDISYLATE 5 MG/ML
10 INJECTION INTRAMUSCULAR; INTRAVENOUS ONCE
Status: COMPLETED | OUTPATIENT
Start: 2022-07-01 | End: 2022-07-01

## 2022-07-01 RX ORDER — ACETAMINOPHEN 500 MG
1000 TABLET ORAL ONCE
Status: DISCONTINUED | OUTPATIENT
Start: 2022-07-01 | End: 2022-07-02 | Stop reason: HOSPADM

## 2022-07-01 RX ORDER — PROCHLORPERAZINE EDISYLATE 5 MG/ML
10 INJECTION INTRAMUSCULAR; INTRAVENOUS ONCE
Status: DISCONTINUED | OUTPATIENT
Start: 2022-07-01 | End: 2022-07-01

## 2022-07-01 RX ORDER — IBUPROFEN 600 MG/1
600 TABLET ORAL EVERY 6 HOURS PRN
Qty: 20 TABLET | Refills: 5 | Status: SHIPPED | OUTPATIENT
Start: 2022-07-01 | End: 2022-07-01 | Stop reason: SDUPTHER

## 2022-07-01 RX ADMIN — PROCHLORPERAZINE EDISYLATE 10 MG: 5 INJECTION INTRAMUSCULAR; INTRAVENOUS at 23:41

## 2022-07-01 RX ADMIN — DIPHENHYDRAMINE HCL 25 MG: 25 TABLET ORAL at 23:42

## 2022-07-01 RX ADMIN — KETOROLAC TROMETHAMINE 30 MG: 30 INJECTION, SOLUTION INTRAMUSCULAR; INTRAVENOUS at 21:28

## 2022-07-01 RX ADMIN — ONDANSETRON 4 MG: 4 TABLET, ORALLY DISINTEGRATING ORAL at 20:34

## 2022-07-01 ASSESSMENT — ENCOUNTER SYMPTOMS
DIARRHEA: 0
SORE THROAT: 0
SHORTNESS OF BREATH: 0
NAUSEA: 0
SINUS PAIN: 0
EYE ITCHING: 0
CONSTIPATION: 0
SINUS PRESSURE: 0
ABDOMINAL DISTENTION: 0
ABDOMINAL PAIN: 0
EYE PAIN: 0
COUGH: 0

## 2022-07-01 ASSESSMENT — PAIN SCALES - GENERAL: PAINLEVEL_OUTOF10: 6

## 2022-07-01 ASSESSMENT — PAIN DESCRIPTION - LOCATION: LOCATION: HEAD

## 2022-07-01 ASSESSMENT — PAIN - FUNCTIONAL ASSESSMENT: PAIN_FUNCTIONAL_ASSESSMENT: 0-10
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
## 2022-07-05 LAB
EKG ATRIAL RATE: 72 BPM
EKG P AXIS: 58 DEGREES
EKG P-R INTERVAL: 154 MS
EKG Q-T INTERVAL: 390 MS
EKG QRS DURATION: 70 MS
EKG QTC CALCULATION (BAZETT): 427 MS
EKG R AXIS: 22 DEGREES
EKG T AXIS: 41 DEGREES
EKG VENTRICULAR RATE: 72 BPM

## 2023-10-07 ENCOUNTER — HOSPITAL ENCOUNTER (EMERGENCY)
Age: 38
Discharge: HOME | End: 2023-10-07
Attending: EMERGENCY MEDICINE
Payer: OTHER GOVERNMENT

## 2023-10-07 ENCOUNTER — APPOINTMENT (OUTPATIENT)
Dept: GENERAL RADIOLOGY | Age: 38
End: 2023-10-07
Payer: OTHER GOVERNMENT

## 2023-10-07 VITALS
OXYGEN SATURATION: 98 % | DIASTOLIC BLOOD PRESSURE: 100 MMHG | HEART RATE: 93 BPM | SYSTOLIC BLOOD PRESSURE: 141 MMHG | TEMPERATURE: 98.2 F | RESPIRATION RATE: 19 BRPM

## 2023-10-07 DIAGNOSIS — S83.92XA SPRAIN OF LEFT KNEE, UNSPECIFIED LIGAMENT, INITIAL ENCOUNTER: Primary | ICD-10-CM

## 2023-10-07 PROCEDURE — 99284 EMERGENCY DEPT VISIT MOD MDM: CPT

## 2023-10-07 PROCEDURE — 6360000002 HC RX W HCPCS: Performed by: STUDENT IN AN ORGANIZED HEALTH CARE EDUCATION/TRAINING PROGRAM

## 2023-10-07 PROCEDURE — 96372 THER/PROPH/DIAG INJ SC/IM: CPT

## 2023-10-07 PROCEDURE — 73562 X-RAY EXAM OF KNEE 3: CPT

## 2023-10-07 RX ORDER — KETOROLAC TROMETHAMINE 30 MG/ML
30 INJECTION, SOLUTION INTRAMUSCULAR; INTRAVENOUS ONCE
Status: COMPLETED | OUTPATIENT
Start: 2023-10-07 | End: 2023-10-07

## 2023-10-07 RX ADMIN — KETOROLAC TROMETHAMINE 30 MG: 30 INJECTION, SOLUTION INTRAMUSCULAR; INTRAVENOUS at 20:35

## 2023-10-07 ASSESSMENT — PAIN DESCRIPTION - LOCATION
LOCATION: KNEE;LEG
LOCATION: KNEE;LEG

## 2023-10-07 ASSESSMENT — PAIN SCALES - GENERAL
PAINLEVEL_OUTOF10: 5
PAINLEVEL_OUTOF10: 5

## 2023-10-07 ASSESSMENT — PAIN - FUNCTIONAL ASSESSMENT: PAIN_FUNCTIONAL_ASSESSMENT: 0-10
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
## 2023-10-08 ASSESSMENT — ENCOUNTER SYMPTOMS
ABDOMINAL PAIN: 0
NAUSEA: 0
VOMITING: 0
SHORTNESS OF BREATH: 0
DIARRHEA: 0
WHEEZING: 0
CONSTIPATION: 0
SORE THROAT: 0
BACK PAIN: 0
SINUS PRESSURE: 0
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
## 2024-03-27 ENCOUNTER — TRANSCRIBE ORDERS (OUTPATIENT)
Dept: ULTRASOUND IMAGING | Age: 39
End: 2024-03-27

## 2024-03-27 DIAGNOSIS — S66.412A STRAIN OF INTRINSIC MUSCLE, FASCIA AND TENDON OF LEFT THUMB AT WRIST AND HAND LEVEL, INITIAL ENCOUNTER: Primary | ICD-10-CM

## 2024-04-01 ENCOUNTER — HOSPITAL ENCOUNTER (OUTPATIENT)
Dept: MRI IMAGING | Age: 39
Discharge: HOME | End: 2024-04-03

## 2024-04-01 DIAGNOSIS — S66.412A STRAIN OF INTRINSIC MUSCLE, FASCIA AND TENDON OF LEFT THUMB AT WRIST AND HAND LEVEL, INITIAL ENCOUNTER: ICD-10-CM

## 2024-04-02 ENCOUNTER — HOSPITAL ENCOUNTER (OUTPATIENT)
Dept: MRI IMAGING | Age: 39
Discharge: HOME | End: 2024-04-04
Payer: COMMERCIAL

## 2024-04-02 DIAGNOSIS — S66.412A STRAIN OF INTRINSIC MUSCLE, FASCIA AND TENDON OF LEFT THUMB AT WRIST AND HAND LEVEL, INITIAL ENCOUNTER: ICD-10-CM

## 2024-04-02 PROCEDURE — 73218 MRI UPPER EXTREMITY W/O DYE: CPT

## 2024-07-31 ENCOUNTER — APPOINTMENT (OUTPATIENT)
Dept: GENERAL RADIOLOGY | Age: 39
End: 2024-07-31

## 2024-07-31 ENCOUNTER — HOSPITAL ENCOUNTER (EMERGENCY)
Age: 39
Discharge: HOME | End: 2024-07-31
Attending: EMERGENCY MEDICINE

## 2024-07-31 VITALS
HEIGHT: 67 IN | DIASTOLIC BLOOD PRESSURE: 95 MMHG | SYSTOLIC BLOOD PRESSURE: 130 MMHG | HEART RATE: 84 BPM | RESPIRATION RATE: 17 BRPM | BODY MASS INDEX: 25.11 KG/M2 | WEIGHT: 160 LBS | TEMPERATURE: 98 F | OXYGEN SATURATION: 98 %

## 2024-07-31 DIAGNOSIS — E86.0 DEHYDRATION AFTER EXERTION: Primary | ICD-10-CM

## 2024-07-31 LAB
ALBUMIN SERPL-MCNC: 4.1 G/DL (ref 3.5–5.2)
ALP SERPL-CCNC: 115 U/L (ref 35–104)
ALT SERPL-CCNC: 16 U/L (ref 5–33)
ANION GAP SERPL CALCULATED.3IONS-SCNC: 13 MMOL/L (ref 9–17)
AST SERPL-CCNC: 19 U/L
BASOPHILS # BLD: 0.1 K/UL (ref 0–0.2)
BASOPHILS NFR BLD: 1 % (ref 0–2)
BILIRUB SERPL-MCNC: 0.3 MG/DL (ref 0.3–1.2)
BUN SERPL-MCNC: 12 MG/DL (ref 6–20)
CALCIUM SERPL-MCNC: 9.4 MG/DL (ref 8.6–10.4)
CHLORIDE SERPL-SCNC: 104 MMOL/L (ref 98–107)
CO2 SERPL-SCNC: 22 MMOL/L (ref 20–31)
CREAT SERPL-MCNC: 0.9 MG/DL (ref 0.5–0.9)
EOSINOPHIL # BLD: 0 K/UL (ref 0–0.4)
EOSINOPHILS RELATIVE PERCENT: 0 % (ref 0–4)
ERYTHROCYTE [DISTWIDTH] IN BLOOD BY AUTOMATED COUNT: 12.9 % (ref 11.5–14.9)
GFR, ESTIMATED: 83 ML/MIN/1.73M2
GLUCOSE SERPL-MCNC: 94 MG/DL (ref 70–99)
HCT VFR BLD AUTO: 35.4 % (ref 36–46)
HGB BLD-MCNC: 11.6 G/DL (ref 12–16)
LYMPHOCYTES NFR BLD: 1.8 K/UL (ref 1–4.8)
LYMPHOCYTES RELATIVE PERCENT: 20 % (ref 24–44)
MCH RBC QN AUTO: 28.4 PG (ref 26–34)
MCHC RBC AUTO-ENTMCNC: 32.9 G/DL (ref 31–37)
MCV RBC AUTO: 86.3 FL (ref 80–100)
MONOCYTES NFR BLD: 0.4 K/UL (ref 0.1–1.3)
MONOCYTES NFR BLD: 5 % (ref 1–7)
NEUTROPHILS NFR BLD: 74 % (ref 36–66)
NEUTS SEG NFR BLD: 6.6 K/UL (ref 1.3–9.1)
PLATELET # BLD AUTO: 242 K/UL (ref 150–450)
PMV BLD AUTO: 8.8 FL (ref 6–12)
POTASSIUM SERPL-SCNC: 3.2 MMOL/L (ref 3.7–5.3)
PROT SERPL-MCNC: 7.4 G/DL (ref 6.4–8.3)
RBC # BLD AUTO: 4.1 M/UL (ref 4–5.2)
SODIUM SERPL-SCNC: 139 MMOL/L (ref 135–144)
TROPONIN I SERPL HS-MCNC: 6 NG/L (ref 0–14)
TROPONIN I SERPL HS-MCNC: 6 NG/L (ref 0–14)
WBC OTHER # BLD: 8.9 K/UL (ref 3.5–11)

## 2024-07-31 PROCEDURE — 93005 ELECTROCARDIOGRAM TRACING: CPT

## 2024-07-31 PROCEDURE — 2580000003 HC RX 258

## 2024-07-31 PROCEDURE — 96361 HYDRATE IV INFUSION ADD-ON: CPT

## 2024-07-31 PROCEDURE — 36415 COLL VENOUS BLD VENIPUNCTURE: CPT

## 2024-07-31 PROCEDURE — 96374 THER/PROPH/DIAG INJ IV PUSH: CPT

## 2024-07-31 PROCEDURE — 85025 COMPLETE CBC W/AUTO DIFF WBC: CPT

## 2024-07-31 PROCEDURE — 84484 ASSAY OF TROPONIN QUANT: CPT

## 2024-07-31 PROCEDURE — 6370000000 HC RX 637 (ALT 250 FOR IP): Performed by: EMERGENCY MEDICINE

## 2024-07-31 PROCEDURE — 71046 X-RAY EXAM CHEST 2 VIEWS: CPT

## 2024-07-31 PROCEDURE — 80053 COMPREHEN METABOLIC PANEL: CPT

## 2024-07-31 PROCEDURE — 6360000002 HC RX W HCPCS

## 2024-07-31 PROCEDURE — 99285 EMERGENCY DEPT VISIT HI MDM: CPT

## 2024-07-31 RX ORDER — 0.9 % SODIUM CHLORIDE 0.9 %
1000 INTRAVENOUS SOLUTION INTRAVENOUS ONCE
Status: COMPLETED | OUTPATIENT
Start: 2024-07-31 | End: 2024-07-31

## 2024-07-31 RX ORDER — ONDANSETRON 2 MG/ML
4 INJECTION INTRAMUSCULAR; INTRAVENOUS ONCE
Status: COMPLETED | OUTPATIENT
Start: 2024-07-31 | End: 2024-07-31

## 2024-07-31 RX ADMIN — POTASSIUM BICARBONATE 40 MEQ: 782 TABLET, EFFERVESCENT ORAL at 20:38

## 2024-07-31 RX ADMIN — ONDANSETRON 4 MG: 2 INJECTION INTRAMUSCULAR; INTRAVENOUS at 20:07

## 2024-07-31 RX ADMIN — SODIUM CHLORIDE 1000 ML: 9 INJECTION, SOLUTION INTRAVENOUS at 20:06

## 2024-07-31 ASSESSMENT — ENCOUNTER SYMPTOMS
DIARRHEA: 0
BACK PAIN: 0
NAUSEA: 0
ABDOMINAL PAIN: 0
VOMITING: 0
COUGH: 0
SHORTNESS OF BREATH: 0

## 2024-07-31 ASSESSMENT — LIFESTYLE VARIABLES
HOW MANY STANDARD DRINKS CONTAINING ALCOHOL DO YOU HAVE ON A TYPICAL DAY: PATIENT DOES NOT DRINK
HOW OFTEN DO YOU HAVE A DRINK CONTAINING ALCOHOL: NEVER

## 2024-07-31 ASSESSMENT — PAIN - FUNCTIONAL ASSESSMENT: PAIN_FUNCTIONAL_ASSESSMENT: NONE - DENIES PAIN

## 2024-07-31 NOTE — ED PROVIDER NOTES
EMERGENCY DEPARTMENT ENCOUNTER   ATTENDING ATTESTATION     Pt Name: Jack Gómez  MRN: [id number]  Birthdate 1985  Date of evaluation: 7/31/24       Jack Gómez is a 38 y.o. female who presents with Palpitations      MDM: 38-year-old female presents for palpitations.  Reports that she was doing PT for work today and after she finished was having palpitations she states that she was having a little bit of chest pain after and presented for evaluation, has reported history of A-fib    On initial exam patient no acute distress, vital stable, lungs clear, regular rate and rhythm, no neurodeficits    EKG showing normal sinus rhythm rate of 89, right axis, normal intervals, no ST segment elevation or depression nonspecific T wave changes      PERC Rule Negative  Age < 50 years  Pulse < 100 bpm  SaO2 > 94%  No unilateral leg swelling  No hemoptysis  No recent trauma or surgery  No prior PE or DVT  No hormone use     Will check labs, chest x-ray      Labs are reviewed and potassium noted at 3.2 will replace, remaining labs unremarkable, troponins are negative x 2    Chest x-ray showed no acute normality    Patient reevaluated reports improvement of her symptoms, given that she has improvement of symptoms, no acute findings on labs, chest x-ray or EKG stable for discharge home    Results were discussed with patient discussed need for follow-up with PCP and return precautions, patient voiced understanding comfortable plan and discharge    Patient/Guardian was informed of their diagnosis and told to follow up with PCP & cardiology in 1-3 days. Patient demonstrates understanding and agreement with the plan. They were given the opportunity to ask questions and those questions were answered to the best of our ability with the available information. Patient/Guardian told to return to the ED for any new, worsening, changing or persistent symptoms.       This dictation was prepared using [employer] 
voices understanding. [KM]      ED Course User Index  [KM] Konstantin Ortega MD       PROCEDURES:  None    CONSULTS:  None    CRITICAL CARE:  There was significant risk of life threatening deterioration of patient's condition requiring my direct management. Critical care time 10 minutes, excluding any documented procedures.    FINAL IMPRESSION      1. Dehydration after exertion          DISPOSITION / PLAN     DISPOSITION Discharge - Pending Orders Complete 07/31/2024 09:33:52 PM      PATIENT REFERRED TO:  Debbie Villareal MD  [address] [facility] [id number] [phone]    Schedule an appointment as soon as possible for a visit in 3 days        DISCHARGE MEDICATIONS:  Current Discharge Medication List          Konstantin Ortega MD  Emergency Medicine Resident    (Please note that portions of thisnote were completed with a voice recognition program.  Efforts were made to edit the dictations but occasionally words are mis-transcribed.)

## 2024-07-31 NOTE — ED TRIAGE NOTES
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 Pt states she was able to eat and drink after and has not vomited, however, she is nauseous. Pt states she is short of breath. Pt breathing regular and nonlabored at this time. Pt A&Ox4.        C= \"Have you ever felt that you should Cut down on your drinking?\"  No  A= \"Have people Annoyed you by criticizing your drinking?\"  No  G= \"Have you ever felt bad or Guilty about your drinking?\"  No  E= \"Have you ever had a drink as an Eye-opener first thing in the morning to steady your nerves or to help a hangover?\"  No      Deferred []      Reason for deferring: N/A    *If yes to two or more: probable alcohol abuse.*

## 2024-08-01 NOTE — DISCHARGE INSTRUCTIONS
Take your medication as indicated and prescribed.  Avoid drinking alcohol or drinks that have caffeine it.  Drink plenty of water or fluids like Gatorade to keep yourself hydrated.  You should eat bland foods like bananas, rice, apple sauce and toast / crackers.    PLEASE RETURN TO THE EMERGENCY DEPARTMENT IMMEDIATELY for worsening symptoms, increase in the amount of diarrhea you are having, abdominal pain, blood in your stool, vomiting up blood, persistent nausea and/or vomiting, or if you develop any concerning symptoms such as: high fever not relieved by acetaminophen (Tylenol) and/or ibuprofen (Motrin / Advil), chills, shortness of breath, chest pain, feeling of your heart fluttering or racing, loss of consciousness, numbness, weakness or tingling in the arms or legs or change in color of the extremities, changes in mental status, persistent headache, blurry vision, loss of bladder / bowel control, unable to follow up with your physician, or any other care or concern.

## 2024-08-03 LAB
EKG ATRIAL RATE: 89 BPM
EKG P AXIS: 64 DEGREES
EKG P-R INTERVAL: 150 MS
EKG Q-T INTERVAL: 366 MS
EKG QRS DURATION: 72 MS
EKG QTC CALCULATION (BAZETT): 445 MS
EKG R AXIS: 96 DEGREES
EKG T AXIS: 44 DEGREES
EKG VENTRICULAR RATE: 89 BPM

## 2024-08-03 PROCEDURE — 93010 ELECTROCARDIOGRAM REPORT: CPT | Performed by: INTERNAL MEDICINE

## 2024-11-20 ENCOUNTER — TELEPHONE (OUTPATIENT)
Dept: ORTHOPEDIC SURGERY | Age: 39
End: 2024-11-20

## 2024-11-20 NOTE — TELEPHONE ENCOUNTER
Called patient regarding a referral that was sent to our office. I left a message with the number for the patient to call and schedule with [facility].

## 2024-12-31 ENCOUNTER — TELEPHONE (OUTPATIENT)
Dept: ORTHOPEDIC SURGERY | Age: 39
End: 2024-12-31

## 2024-12-31 NOTE — TELEPHONE ENCOUNTER
Spoke to Michaela the  for the patient's [facility] case and let her know that there is a C-9 scanned into the patient's chart with approval from 11/12/24 to 12/31/24 on 11/20/24.    It doesn't specify on the C-9 that it had to go to Dr. Linn in particular. Therefore any ortho-provider could have been consulted to take on this case.    Did discuss with Michaela that this will need to be extended out and she decided to extend the C-9 out to 2/1/25 so that the case can be discussed with my  who will go over the case with Dr. Linn and see if Dr. Linn will be willing to take on the case.    Once we have the okay from Dr. Linn, we can then schedule the patient to be seen by either Dr. Linn himself or Sho Soliman PA-C here at the [place] office.    She stated that once she has been approved to the extension, she will fax it over to our office as well.    Awaiting extension of C-9 approval.

## 2024-12-31 NOTE — TELEPHONE ENCOUNTER
Received call from Michaela estrada [facility] requesting to speak to office regarding patients [facility] case.     Please advise.    Call back#: [phone]

## 2025-01-03 NOTE — TELEPHONE ENCOUNTER
Called and Spoke to the patient. Let her know that I am currently awaiting the C-9 approval for an extension as the previous C-9 was approved from 11/12/24-12/31/24 and the patient had never been contacted or scheduled and then once we have that, we will give her a call to be scheduled with Dr. Linn and/or one of his PA-Cs that work with him. She expressed understanding.

## 2025-01-03 NOTE — TELEPHONE ENCOUNTER
Pt called today to follow up on request for [facility] care with Dr. Linn. She said she is being contacted by [facility] as to whether she has scheduled. Please call her to schedule or advise when able.     Phone: [phone]   OK to leave detailed voicemail message

## 2025-01-07 ENCOUNTER — TELEPHONE (OUTPATIENT)
Dept: ORTHOPEDIC SURGERY | Age: 40
End: 2025-01-07

## 2025-01-07 NOTE — TELEPHONE ENCOUNTER
Called patient regarding a referral that was sent to our office. It is a WC referral, Left message for patient to call and schedule

## 2025-01-08 NOTE — TELEPHONE ENCOUNTER
Called pt to schedule her for new pt workers comp appt for L wrist. C-9 approval extended to 2-1-25.

## 2025-01-21 ENCOUNTER — TELEPHONE (OUTPATIENT)
Dept: ORTHOPEDIC SURGERY | Age: 40
End: 2025-01-21

## 2025-02-19 ENCOUNTER — OFFICE VISIT (OUTPATIENT)
Dept: ORTHOPEDIC SURGERY | Age: 40
End: 2025-02-19

## 2025-02-19 VITALS — HEIGHT: 67 IN | WEIGHT: 160 LBS | BODY MASS INDEX: 25.11 KG/M2

## 2025-02-19 DIAGNOSIS — M18.12 ARTHRITIS OF CARPOMETACARPAL (CMC) JOINT OF LEFT THUMB: ICD-10-CM

## 2025-02-19 DIAGNOSIS — R52 PAIN: Primary | ICD-10-CM

## 2025-02-19 DIAGNOSIS — M79.645 CHRONIC PAIN OF LEFT THUMB: ICD-10-CM

## 2025-02-19 DIAGNOSIS — G89.29 CHRONIC PAIN OF LEFT THUMB: ICD-10-CM
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
## 2025-02-20 ENCOUNTER — TELEPHONE (OUTPATIENT)
Dept: ORTHOPEDIC SURGERY | Age: 40
End: 2025-02-20

## 2025-02-20 NOTE — TELEPHONE ENCOUNTER
Per OVN:   \"At this time feel that she is having pain from first CMC arthritis.\" Will submit C9 for approval of left first CMC joint corticosteroid injection       Patient was approved for [facility] under ICD-10 S66.412A- Strain of intrinsic muscle, fascia and tendon of left thumb at wrist and hand level. Note needs addendum to accommodate current allowed condition. If it does not fall under that we would need to request CMC joint arthritis as a new condition on her claim. If we request for arthritis  we need to provide Causality. Once approved, we can then request the injection.      Please review and advise how you would like me to move proceed.

## 2025-03-04 ENCOUNTER — TELEPHONE (OUTPATIENT)
Dept: ORTHOPEDIC SURGERY | Age: 40
End: 2025-03-04

## 2025-04-10 ENCOUNTER — APPOINTMENT (OUTPATIENT)
Dept: CT IMAGING | Age: 40
End: 2025-04-10
Payer: COMMERCIAL

## 2025-04-10 ENCOUNTER — ANESTHESIA EVENT (OUTPATIENT)
Dept: OPERATING ROOM | Age: 40
End: 2025-04-10
Payer: COMMERCIAL

## 2025-04-10 ENCOUNTER — ANESTHESIA (OUTPATIENT)
Dept: OPERATING ROOM | Age: 40
End: 2025-04-10
Payer: COMMERCIAL

## 2025-04-10 ENCOUNTER — APPOINTMENT (OUTPATIENT)
Dept: GENERAL RADIOLOGY | Age: 40
End: 2025-04-10
Payer: COMMERCIAL

## 2025-04-10 ENCOUNTER — APPOINTMENT (OUTPATIENT)
Dept: MRI IMAGING | Age: 40
End: 2025-04-10
Payer: COMMERCIAL

## 2025-04-10 ENCOUNTER — HOSPITAL ENCOUNTER (INPATIENT)
Age: 40
LOS: 8 days | Discharge: NURSING, REHAB OR LONG-TERM CARE | End: 2025-04-18
Attending: EMERGENCY MEDICINE | Admitting: PSYCHIATRY & NEUROLOGY
Payer: COMMERCIAL

## 2025-04-10 DIAGNOSIS — R41.82 ALTERED MENTAL STATUS, UNSPECIFIED ALTERED MENTAL STATUS TYPE: ICD-10-CM

## 2025-04-10 DIAGNOSIS — I61.2 NONTRAUMATIC HEMORRHAGE OF LEFT CEREBRAL HEMISPHERE (HCC): ICD-10-CM

## 2025-04-10 DIAGNOSIS — I62.9 INTRACRANIAL HEMORRHAGE (HCC): Primary | ICD-10-CM

## 2025-04-10 PROBLEM — G93.6 CEREBRAL EDEMA (HCC): Status: ACTIVE | Noted: 2025-04-10

## 2025-04-10 PROBLEM — G93.5 UNCAL HERNIATION (HCC): Status: ACTIVE | Noted: 2025-04-10

## 2025-04-10 PROBLEM — S06.A0XA MIDLINE SHIFT OF BRAIN DUE TO HEMATOMA (HCC): Status: ACTIVE | Noted: 2025-04-10

## 2025-04-10 PROBLEM — R40.2430 GLASGOW COMA SCALE TOTAL SCORE 3-8 (HCC): Status: ACTIVE | Noted: 2025-04-10

## 2025-04-10 PROBLEM — I61.9 INTRACEREBRAL HEMORRHAGE, NONTRAUMATIC (HCC): Status: ACTIVE | Noted: 2025-04-10

## 2025-04-10 PROBLEM — S06.2XAA MIDLINE SHIFT OF BRAIN DUE TO HEMATOMA (HCC): Status: ACTIVE | Noted: 2025-04-10

## 2025-04-10 LAB
ABO + RH BLD: NORMAL
ALLEN TEST: POSITIVE
AMMONIA PLAS-SCNC: 23 UMOL/L (ref 11–51)
AMPHET UR QL SCN: NEGATIVE
ANION GAP SERPL CALCULATED.3IONS-SCNC: 15 MMOL/L (ref 9–16)
ANION GAP SERPL CALCULATED.3IONS-SCNC: 15 MMOL/L (ref 9–16)
APAP SERPL-MCNC: <5 UG/ML (ref 10–30)
ARM BAND NUMBER: NORMAL
ARTERIAL PATENCY WRIST A: ABNORMAL
ARTERIAL PATENCY WRIST A: ABNORMAL
B-HCG SERPL EIA 3RD IS-ACNC: 3.1 MIU/ML
BARBITURATES UR QL SCN: NEGATIVE
BASOPHILS # BLD: 0.09 K/UL (ref 0–0.2)
BASOPHILS NFR BLD: 1 % (ref 0–2)
BENZODIAZ UR QL: NEGATIVE
BLOOD BANK SAMPLE EXPIRATION: NORMAL
BLOOD GROUP ANTIBODIES SERPL: NEGATIVE
BODY TEMPERATURE: 34.9
BODY TEMPERATURE: 35.2
BUN BLD-MCNC: 12 MG/DL (ref 8–26)
BUN SERPL-MCNC: 11 MG/DL (ref 6–20)
BUN SERPL-MCNC: 9 MG/DL (ref 6–20)
CA-I BLD-SCNC: 1.15 MMOL/L (ref 1.13–1.33)
CA-I BLD-SCNC: 1.17 MMOL/L (ref 1.13–1.33)
CA-I BLD-SCNC: 1.32 MMOL/L (ref 1.15–1.33)
CALCIUM SERPL-MCNC: 8.9 MG/DL (ref 8.6–10.4)
CALCIUM SERPL-MCNC: 9.5 MG/DL (ref 8.6–10.4)
CANNABINOIDS UR QL SCN: POSITIVE
CHLORIDE BLD-SCNC: 105 MMOL/L (ref 98–107)
CHLORIDE SERPL-SCNC: 105 MMOL/L (ref 98–107)
CHLORIDE SERPL-SCNC: 113 MMOL/L (ref 98–107)
CHLORIDE, WHOLE BLOOD: 104 MMOL/L (ref 98–110)
CHLORIDE, WHOLE BLOOD: 108 MMOL/L (ref 98–110)
CK SERPL-CCNC: 83 U/L (ref 26–192)
CO2 BLD CALC-SCNC: 25 MMOL/L (ref 22–30)
CO2 SERPL-SCNC: 20 MMOL/L (ref 20–31)
CO2 SERPL-SCNC: 20 MMOL/L (ref 20–31)
COCAINE UR QL SCN: NEGATIVE
COHGB MFR BLD: 0.1 % (ref 0–5)
COHGB MFR BLD: 0.2 % (ref 0–5)
CREAT SERPL-MCNC: 0.7 MG/DL (ref 0.6–0.9)
CREAT SERPL-MCNC: 0.8 MG/DL (ref 0.6–0.9)
EGFR, POC: >90 ML/MIN/1.73M2
EOSINOPHIL # BLD: 0.34 K/UL (ref 0–0.44)
EOSINOPHILS RELATIVE PERCENT: 4 % (ref 1–4)
ERYTHROCYTE [DISTWIDTH] IN BLOOD BY AUTOMATED COUNT: 11.9 % (ref 11.8–14.4)
ETHANOL PERCENT: <0.01 %
ETHANOLAMINE SERPL-MCNC: <10 MG/DL (ref 0–0.08)
FENTANYL UR QL: NEGATIVE
FIO2 ON VENT: 35 %
FIO2 ON VENT: 60 %
FIO2: 50
GFR, ESTIMATED: >90 ML/MIN/1.73M2
GFR, ESTIMATED: >90 ML/MIN/1.73M2
GLUCOSE BLD-MCNC: 167 MG/DL (ref 65–105)
GLUCOSE BLD-MCNC: 204 MG/DL (ref 65–105)
GLUCOSE BLD-MCNC: 225 MG/DL (ref 74–100)
GLUCOSE SERPL-MCNC: 154 MG/DL (ref 74–99)
GLUCOSE SERPL-MCNC: 210 MG/DL (ref 74–99)
HCG SERPL QL: POSITIVE
HCO3 ARTERIAL: 18.3 MMOL/L (ref 22–27)
HCO3 ARTERIAL: 18.6 MMOL/L (ref 22–27)
HCO3 VENOUS: 25.4 MMOL/L (ref 22–29)
HCT VFR BLD AUTO: 36.4 % (ref 36.3–47.1)
HCT VFR BLD AUTO: 40 % (ref 36–46)
HCT VFR BLD CALC: 36 % (ref 36.3–47.1)
HCT VFR BLD CALC: 37 % (ref 36.3–47.1)
HEMOGLOBIN: 12.2 GM/DL (ref 11.9–15.1)
HEMOGLOBIN: 12.6 GM/DL (ref 11.9–15.1)
HGB BLD-MCNC: 12 G/DL (ref 11.9–15.1)
IMM GRANULOCYTES # BLD AUTO: 0 K/UL (ref 0–0.3)
IMM GRANULOCYTES NFR BLD: 0 %
INR PPP: 1
LACTIC ACID, WHOLE BLOOD: 3.7 MMOL/L (ref 0.7–2.1)
LACTIC ACID, WHOLE BLOOD: 3.9 MMOL/L (ref 0.7–2.1)
LYMPHOCYTES NFR BLD: 4.92 K/UL (ref 1.1–3.7)
LYMPHOCYTES RELATIVE PERCENT: 58 % (ref 24–43)
MCH RBC QN AUTO: 27.3 PG (ref 25.2–33.5)
MCHC RBC AUTO-ENTMCNC: 33 G/DL (ref 28.4–34.8)
MCV RBC AUTO: 82.9 FL (ref 82.6–102.9)
METHADONE UR QL: NEGATIVE
MODE: ABNORMAL
MONOCYTES NFR BLD: 0.77 K/UL (ref 0.1–1.2)
MONOCYTES NFR BLD: 9 % (ref 3–12)
MORPHOLOGY: NORMAL
MYOGLOBIN SERPL-MCNC: 22 NG/ML (ref 25–58)
NEGATIVE BASE EXCESS, ART: 0.7 MMOL/L (ref 0–2)
NEGATIVE BASE EXCESS, ART: 5.6 MMOL/L (ref 0–2)
NEGATIVE BASE EXCESS, ART: 6.5 MMOL/L (ref 0–2)
NEUTROPHILS NFR BLD: 28 % (ref 36–65)
NEUTS SEG NFR BLD: 2.38 K/UL (ref 1.5–8.1)
NRBC BLD-RTO: 0 PER 100 WBC
O2 DELIVERY DEVICE: ABNORMAL
O2 SAT, ARTERIAL: 96.8 % (ref 94–100)
O2 SAT, ARTERIAL: 99.6 % (ref 94–100)
O2 SAT, VEN: 81.7 % (ref 60–85)
OPIATES UR QL SCN: NEGATIVE
OXYCODONE UR QL SCN: NEGATIVE
PARTIAL THROMBOPLASTIN TIME: 24.4 SEC (ref 23–36.5)
PCO2 ARTERIAL: 32.5 MMHG (ref 32–45)
PCO2 ARTERIAL: 34.3 MMHG (ref 32–45)
PCO2 VENOUS: 42.3 MM HG (ref 41–51)
PCO2, ART, TEMP ADJ: 30 (ref 32–45)
PCO2, ART, TEMP ADJ: 31.3 (ref 32–45)
PCP UR QL SCN: NEGATIVE
PH ARTERIAL: 7.34 (ref 7.35–7.45)
PH ARTERIAL: 7.38 (ref 7.35–7.45)
PH VENOUS: 7.39 (ref 7.32–7.43)
PH, ART, TEMP ADJ: 7.38 (ref 7.35–7.45)
PH, ART, TEMP ADJ: 7.4 (ref 7.35–7.45)
PLATELET # BLD AUTO: 326 K/UL (ref 138–453)
PMV BLD AUTO: 10.5 FL (ref 8.1–13.5)
PO2 ARTERIAL: 265.1 MMHG (ref 75–95)
PO2 ARTERIAL: 92.6 MMHG (ref 75–95)
PO2 VENOUS: 46.8 MM HG (ref 30–50)
PO2, ART, TEMP ADJ: 256.7 MMHG (ref 75–95)
PO2, ART, TEMP ADJ: 81.3 MMHG (ref 75–95)
POC ANION GAP: 15 MMOL/L (ref 7–16)
POC CREATININE: 0.8 MG/DL (ref 0.51–1.19)
POC HCO3: 24.5 MMOL/L (ref 21–28)
POC HEMOGLOBIN (CALC): 13.5 G/DL (ref 12–16)
POC LACTIC ACID: 2.3 MMOL/L (ref 0.56–1.39)
POC O2 SATURATION: 99.7 % (ref 94–98)
POC PCO2: 41 MM HG (ref 35–48)
POC PH: 7.38 (ref 7.35–7.45)
POC PO2: 201.6 MM HG (ref 83–108)
POSITIVE BASE EXCESS, VEN: 0.2 MMOL/L (ref 0–3)
POTASSIUM BLD-SCNC: 2.7 MMOL/L (ref 3.5–4.5)
POTASSIUM SERPL-SCNC: 2.9 MMOL/L (ref 3.7–5.3)
POTASSIUM SERPL-SCNC: 4.2 MMOL/L (ref 3.7–5.3)
POTASSIUM, WHOLE BLOOD: 4 MMOL/L (ref 3.6–5)
POTASSIUM, WHOLE BLOOD: 4.2 MMOL/L (ref 3.6–5)
PROTHROMBIN TIME: 13.5 SEC (ref 11.7–14.9)
RBC # BLD AUTO: 4.39 M/UL (ref 3.95–5.11)
SALICYLATES SERPL-MCNC: <0.5 MG/DL (ref 0–10)
SAMPLE SITE: ABNORMAL
SODIUM BLD-SCNC: 144 MMOL/L (ref 138–146)
SODIUM SERPL-SCNC: 140 MMOL/L (ref 136–145)
SODIUM SERPL-SCNC: 144 MMOL/L (ref 136–145)
SODIUM SERPL-SCNC: 148 MMOL/L (ref 136–145)
SODIUM, WHOLE BLOOD: 140 MMOL/L (ref 136–145)
SODIUM, WHOLE BLOOD: 144 MMOL/L (ref 136–145)
TEST INFORMATION: ABNORMAL
TROPONIN I SERPL HS-MCNC: <6 NG/L (ref 0–14)
TSH SERPL DL<=0.05 MIU/L-ACNC: 0.59 UIU/ML (ref 0.27–4.2)
WBC OTHER # BLD: 8.5 K/UL (ref 3.5–11.3)

## 2025-04-10 PROCEDURE — 6360000002 HC RX W HCPCS: Performed by: NURSE ANESTHETIST, CERTIFIED REGISTERED

## 2025-04-10 PROCEDURE — 82803 BLOOD GASES ANY COMBINATION: CPT

## 2025-04-10 PROCEDURE — 2500000003 HC RX 250 WO HCPCS: Performed by: NURSE PRACTITIONER

## 2025-04-10 PROCEDURE — 3700000001 HC ADD 15 MINUTES (ANESTHESIA): Performed by: NEUROLOGICAL SURGERY

## 2025-04-10 PROCEDURE — 6360000002 HC RX W HCPCS: Performed by: NURSE PRACTITIONER

## 2025-04-10 PROCEDURE — 6370000000 HC RX 637 (ALT 250 FOR IP): Performed by: NEUROLOGICAL SURGERY

## 2025-04-10 PROCEDURE — 82565 ASSAY OF CREATININE: CPT

## 2025-04-10 PROCEDURE — 84702 CHORIONIC GONADOTROPIN TEST: CPT

## 2025-04-10 PROCEDURE — 88341 IMHCHEM/IMCYTCHM EA ADD ANTB: CPT

## 2025-04-10 PROCEDURE — 6360000002 HC RX W HCPCS: Performed by: NEUROLOGICAL SURGERY

## 2025-04-10 PROCEDURE — 3600000014 HC SURGERY LEVEL 4 ADDTL 15MIN: Performed by: NEUROLOGICAL SURGERY

## 2025-04-10 PROCEDURE — 80307 DRUG TEST PRSMV CHEM ANLYZR: CPT

## 2025-04-10 PROCEDURE — 85025 COMPLETE CBC W/AUTO DIFF WBC: CPT

## 2025-04-10 PROCEDURE — 37799 UNLISTED PX VASCULAR SURGERY: CPT

## 2025-04-10 PROCEDURE — 94761 N-INVAS EAR/PLS OXIMETRY MLT: CPT

## 2025-04-10 PROCEDURE — 80051 ELECTROLYTE PANEL: CPT

## 2025-04-10 PROCEDURE — 84295 ASSAY OF SERUM SODIUM: CPT

## 2025-04-10 PROCEDURE — 83605 ASSAY OF LACTIC ACID: CPT

## 2025-04-10 PROCEDURE — 6360000004 HC RX CONTRAST MEDICATION

## 2025-04-10 PROCEDURE — 74018 RADEX ABDOMEN 1 VIEW: CPT

## 2025-04-10 PROCEDURE — 99223 1ST HOSP IP/OBS HIGH 75: CPT | Performed by: NEUROLOGICAL SURGERY

## 2025-04-10 PROCEDURE — 6360000002 HC RX W HCPCS

## 2025-04-10 PROCEDURE — 99223 1ST HOSP IP/OBS HIGH 75: CPT | Performed by: PSYCHIATRY & NEUROLOGY

## 2025-04-10 PROCEDURE — 86900 BLOOD TYPING SEROLOGIC ABO: CPT

## 2025-04-10 PROCEDURE — 88307 TISSUE EXAM BY PATHOLOGIST: CPT

## 2025-04-10 PROCEDURE — APPSS30 APP SPLIT SHARED TIME 16-30 MINUTES: Performed by: NURSE PRACTITIONER

## 2025-04-10 PROCEDURE — 2700000000 HC OXYGEN THERAPY PER DAY

## 2025-04-10 PROCEDURE — 2580000003 HC RX 258: Performed by: NURSE PRACTITIONER

## 2025-04-10 PROCEDURE — 84443 ASSAY THYROID STIM HORMONE: CPT

## 2025-04-10 PROCEDURE — 3600000004 HC SURGERY LEVEL 4 BASE: Performed by: NEUROLOGICAL SURGERY

## 2025-04-10 PROCEDURE — 82140 ASSAY OF AMMONIA: CPT

## 2025-04-10 PROCEDURE — 96375 TX/PRO/DX INJ NEW DRUG ADDON: CPT

## 2025-04-10 PROCEDURE — 70450 CT HEAD/BRAIN W/O DYE: CPT

## 2025-04-10 PROCEDURE — 61510 CRNEC TREPH EXC BRN TUM STTL: CPT | Performed by: PHYSICIAN ASSISTANT

## 2025-04-10 PROCEDURE — 2500000003 HC RX 250 WO HCPCS: Performed by: NURSE ANESTHETIST, CERTIFIED REGISTERED

## 2025-04-10 PROCEDURE — 80179 DRUG ASSAY SALICYLATE: CPT

## 2025-04-10 PROCEDURE — 94002 VENT MGMT INPAT INIT DAY: CPT

## 2025-04-10 PROCEDURE — 85730 THROMBOPLASTIN TIME PARTIAL: CPT

## 2025-04-10 PROCEDURE — 82805 BLOOD GASES W/O2 SATURATION: CPT

## 2025-04-10 PROCEDURE — 82550 ASSAY OF CK (CPK): CPT

## 2025-04-10 PROCEDURE — 1200000000 HC SEMI PRIVATE

## 2025-04-10 PROCEDURE — 88342 IMHCHEM/IMCYTCHM 1ST ANTB: CPT

## 2025-04-10 PROCEDURE — 2580000003 HC RX 258: Performed by: NURSE ANESTHETIST, CERTIFIED REGISTERED

## 2025-04-10 PROCEDURE — C1763 CONN TISS, NON-HUMAN: HCPCS | Performed by: NEUROLOGICAL SURGERY

## 2025-04-10 PROCEDURE — 85610 PROTHROMBIN TIME: CPT

## 2025-04-10 PROCEDURE — 61781 SCAN PROC CRANIAL INTRA: CPT | Performed by: NEUROLOGICAL SURGERY

## 2025-04-10 PROCEDURE — 70546 MR ANGIOGRAPH HEAD W/O&W/DYE: CPT

## 2025-04-10 PROCEDURE — 80048 BASIC METABOLIC PNL TOTAL CA: CPT

## 2025-04-10 PROCEDURE — G0480 DRUG TEST DEF 1-7 CLASSES: HCPCS

## 2025-04-10 PROCEDURE — 88331 PATH CONSLTJ SURG 1 BLK 1SPC: CPT

## 2025-04-10 PROCEDURE — 88305 TISSUE EXAM BY PATHOLOGIST: CPT

## 2025-04-10 PROCEDURE — 84132 ASSAY OF SERUM POTASSIUM: CPT

## 2025-04-10 PROCEDURE — 36600 WITHDRAWAL OF ARTERIAL BLOOD: CPT

## 2025-04-10 PROCEDURE — 71045 X-RAY EXAM CHEST 1 VIEW: CPT

## 2025-04-10 PROCEDURE — 6360000002 HC RX W HCPCS: Performed by: EMERGENCY MEDICINE

## 2025-04-10 PROCEDURE — 99291 CRITICAL CARE FIRST HOUR: CPT | Performed by: STUDENT IN AN ORGANIZED HEALTH CARE EDUCATION/TRAINING PROGRAM

## 2025-04-10 PROCEDURE — 88360 TUMOR IMMUNOHISTOCHEM/MANUAL: CPT

## 2025-04-10 PROCEDURE — 6360000002 HC RX W HCPCS: Performed by: REGISTERED NURSE

## 2025-04-10 PROCEDURE — 85014 HEMATOCRIT: CPT

## 2025-04-10 PROCEDURE — 82553 CREATINE MB FRACTION: CPT

## 2025-04-10 PROCEDURE — 2500000003 HC RX 250 WO HCPCS

## 2025-04-10 PROCEDURE — 93005 ELECTROCARDIOGRAM TRACING: CPT

## 2025-04-10 PROCEDURE — 2709999900 HC NON-CHARGEABLE SUPPLY: Performed by: NEUROLOGICAL SURGERY

## 2025-04-10 PROCEDURE — 36556 INSERT NON-TUNNEL CV CATH: CPT

## 2025-04-10 PROCEDURE — 6360000004 HC RX CONTRAST MEDICATION: Performed by: NURSE PRACTITIONER

## 2025-04-10 PROCEDURE — 86850 RBC ANTIBODY SCREEN: CPT

## 2025-04-10 PROCEDURE — 61510 CRNEC TREPH EXC BRN TUM STTL: CPT | Performed by: NEUROLOGICAL SURGERY

## 2025-04-10 PROCEDURE — 00C00ZZ EXTIRPATION OF MATTER FROM BRAIN, OPEN APPROACH: ICD-10-PCS | Performed by: NEUROLOGICAL SURGERY

## 2025-04-10 PROCEDURE — 99285 EMERGENCY DEPT VISIT HI MDM: CPT

## 2025-04-10 PROCEDURE — 84484 ASSAY OF TROPONIN QUANT: CPT

## 2025-04-10 PROCEDURE — 84703 CHORIONIC GONADOTROPIN ASSAY: CPT

## 2025-04-10 PROCEDURE — 82947 ASSAY GLUCOSE BLOOD QUANT: CPT

## 2025-04-10 PROCEDURE — 3700000000 HC ANESTHESIA ATTENDED CARE: Performed by: NEUROLOGICAL SURGERY

## 2025-04-10 PROCEDURE — 70553 MRI BRAIN STEM W/O & W/DYE: CPT

## 2025-04-10 PROCEDURE — 2720000010 HC SURG SUPPLY STERILE: Performed by: NEUROLOGICAL SURGERY

## 2025-04-10 PROCEDURE — 70496 CT ANGIOGRAPHY HEAD: CPT

## 2025-04-10 PROCEDURE — 84520 ASSAY OF UREA NITROGEN: CPT

## 2025-04-10 PROCEDURE — 82330 ASSAY OF CALCIUM: CPT

## 2025-04-10 PROCEDURE — 85018 HEMOGLOBIN: CPT

## 2025-04-10 PROCEDURE — A9576 INJ PROHANCE MULTIPACK: HCPCS | Performed by: NURSE PRACTITIONER

## 2025-04-10 PROCEDURE — 86901 BLOOD TYPING SEROLOGIC RH(D): CPT

## 2025-04-10 PROCEDURE — 2500000003 HC RX 250 WO HCPCS: Performed by: NEUROLOGICAL SURGERY

## 2025-04-10 PROCEDURE — 83874 ASSAY OF MYOGLOBIN: CPT

## 2025-04-10 PROCEDURE — 06HY33Z INSERTION OF INFUSION DEVICE INTO LOWER VEIN, PERCUTANEOUS APPROACH: ICD-10-PCS | Performed by: NURSE PRACTITIONER

## 2025-04-10 PROCEDURE — 96374 THER/PROPH/DIAG INJ IV PUSH: CPT

## 2025-04-10 PROCEDURE — 80143 DRUG ASSAY ACETAMINOPHEN: CPT

## 2025-04-10 DEVICE — DURAGEN® PLUS DURAL REGENERATION MATRIX , 4 IN X 5 IN
Type: IMPLANTABLE DEVICE | Site: BRAIN | Status: FUNCTIONAL
Brand: DURAGEN® PLUS

## 2025-04-10 RX ORDER — POTASSIUM CHLORIDE 7.45 MG/ML
10 INJECTION INTRAVENOUS
Status: DISPENSED | OUTPATIENT
Start: 2025-04-10 | End: 2025-04-10

## 2025-04-10 RX ORDER — FENTANYL CITRATE 50 UG/ML
INJECTION, SOLUTION INTRAMUSCULAR; INTRAVENOUS
Status: DISCONTINUED
Start: 2025-04-10 | End: 2025-04-10 | Stop reason: WASHOUT

## 2025-04-10 RX ORDER — HYDRALAZINE HYDROCHLORIDE 20 MG/ML
10 INJECTION INTRAMUSCULAR; INTRAVENOUS
Status: DISCONTINUED | OUTPATIENT
Start: 2025-04-10 | End: 2025-04-11

## 2025-04-10 RX ORDER — ACETAMINOPHEN 325 MG/1
650 TABLET ORAL EVERY 6 HOURS PRN
Status: DISCONTINUED | OUTPATIENT
Start: 2025-04-10 | End: 2025-04-11

## 2025-04-10 RX ORDER — OXYCODONE HYDROCHLORIDE 5 MG/1
10 TABLET ORAL EVERY 4 HOURS PRN
Status: DISCONTINUED | OUTPATIENT
Start: 2025-04-10 | End: 2025-04-11

## 2025-04-10 RX ORDER — MANNITOL 20 G/100ML
1 INJECTION, SOLUTION INTRAVENOUS ONCE
Status: COMPLETED | OUTPATIENT
Start: 2025-04-10 | End: 2025-04-10

## 2025-04-10 RX ORDER — MAGNESIUM SULFATE IN WATER 40 MG/ML
2000 INJECTION, SOLUTION INTRAVENOUS ONCE
Status: COMPLETED | OUTPATIENT
Start: 2025-04-10 | End: 2025-04-10

## 2025-04-10 RX ORDER — ONDANSETRON 4 MG/1
4 TABLET, ORALLY DISINTEGRATING ORAL EVERY 8 HOURS PRN
Status: DISCONTINUED | OUTPATIENT
Start: 2025-04-10 | End: 2025-04-18 | Stop reason: HOSPADM

## 2025-04-10 RX ORDER — LIDOCAINE HYDROCHLORIDE AND EPINEPHRINE 10; 10 MG/ML; UG/ML
INJECTION, SOLUTION INFILTRATION; PERINEURAL PRN
Status: DISCONTINUED | OUTPATIENT
Start: 2025-04-10 | End: 2025-04-10 | Stop reason: ALTCHOICE

## 2025-04-10 RX ORDER — SODIUM CHLORIDE 0.9 % (FLUSH) 0.9 %
5-40 SYRINGE (ML) INJECTION PRN
Status: DISCONTINUED | OUTPATIENT
Start: 2025-04-10 | End: 2025-04-18 | Stop reason: HOSPADM

## 2025-04-10 RX ORDER — LEVETIRACETAM 500 MG/5ML
INJECTION, SOLUTION, CONCENTRATE INTRAVENOUS
Status: DISCONTINUED | OUTPATIENT
Start: 2025-04-10 | End: 2025-04-10 | Stop reason: SDUPTHER

## 2025-04-10 RX ORDER — SODIUM CHLORIDE 0.9 % (FLUSH) 0.9 %
10 SYRINGE (ML) INJECTION PRN
Status: DISCONTINUED | OUTPATIENT
Start: 2025-04-10 | End: 2025-04-18 | Stop reason: HOSPADM

## 2025-04-10 RX ORDER — NICARDIPINE HYDROCHLORIDE 0.1 MG/ML
INJECTION INTRAVENOUS
Status: COMPLETED
Start: 2025-04-10 | End: 2025-04-10

## 2025-04-10 RX ORDER — LABETALOL HYDROCHLORIDE 5 MG/ML
INJECTION, SOLUTION INTRAVENOUS
Status: DISCONTINUED | OUTPATIENT
Start: 2025-04-10 | End: 2025-04-10 | Stop reason: SDUPTHER

## 2025-04-10 RX ORDER — SODIUM CHLORIDE 9 MG/ML
INJECTION, SOLUTION INTRAVENOUS PRN
Status: DISCONTINUED | OUTPATIENT
Start: 2025-04-10 | End: 2025-04-18 | Stop reason: HOSPADM

## 2025-04-10 RX ORDER — NICARDIPINE HYDROCHLORIDE 0.1 MG/ML
2.5-15 INJECTION INTRAVENOUS CONTINUOUS
Status: DISCONTINUED | OUTPATIENT
Start: 2025-04-10 | End: 2025-04-10 | Stop reason: SDUPTHER

## 2025-04-10 RX ORDER — MORPHINE SULFATE 2 MG/ML
2 INJECTION, SOLUTION INTRAMUSCULAR; INTRAVENOUS
Status: DISCONTINUED | OUTPATIENT
Start: 2025-04-10 | End: 2025-04-11

## 2025-04-10 RX ORDER — ACETAMINOPHEN 650 MG/1
650 SUPPOSITORY RECTAL EVERY 6 HOURS PRN
Status: DISCONTINUED | OUTPATIENT
Start: 2025-04-10 | End: 2025-04-11

## 2025-04-10 RX ORDER — POTASSIUM CHLORIDE 29.8 MG/ML
20 INJECTION INTRAVENOUS
Status: COMPLETED | OUTPATIENT
Start: 2025-04-10 | End: 2025-04-10

## 2025-04-10 RX ORDER — SODIUM CHLORIDE AND POTASSIUM CHLORIDE 150; 900 MG/100ML; MG/100ML
INJECTION, SOLUTION INTRAVENOUS ONCE
Status: DISCONTINUED | OUTPATIENT
Start: 2025-04-10 | End: 2025-04-11

## 2025-04-10 RX ORDER — LABETALOL HYDROCHLORIDE 5 MG/ML
10 INJECTION, SOLUTION INTRAVENOUS
Status: DISCONTINUED | OUTPATIENT
Start: 2025-04-10 | End: 2025-04-11

## 2025-04-10 RX ORDER — FENTANYL CITRATE 50 UG/ML
INJECTION, SOLUTION INTRAMUSCULAR; INTRAVENOUS
Status: DISCONTINUED | OUTPATIENT
Start: 2025-04-10 | End: 2025-04-10 | Stop reason: SDUPTHER

## 2025-04-10 RX ORDER — OXYCODONE HYDROCHLORIDE 5 MG/1
5 TABLET ORAL EVERY 4 HOURS PRN
Status: DISCONTINUED | OUTPATIENT
Start: 2025-04-10 | End: 2025-04-11

## 2025-04-10 RX ORDER — SODIUM CHLORIDE 0.9 % (FLUSH) 0.9 %
5-40 SYRINGE (ML) INJECTION EVERY 12 HOURS SCHEDULED
Status: DISCONTINUED | OUTPATIENT
Start: 2025-04-10 | End: 2025-04-18 | Stop reason: HOSPADM

## 2025-04-10 RX ORDER — NALOXONE HYDROCHLORIDE 1 MG/ML
2 INJECTION INTRAMUSCULAR; INTRAVENOUS; SUBCUTANEOUS ONCE
Status: COMPLETED | OUTPATIENT
Start: 2025-04-10 | End: 2025-04-10

## 2025-04-10 RX ORDER — FENTANYL CITRATE 50 UG/ML
50 INJECTION, SOLUTION INTRAMUSCULAR; INTRAVENOUS ONCE
Status: COMPLETED | OUTPATIENT
Start: 2025-04-10 | End: 2025-04-10

## 2025-04-10 RX ORDER — ONDANSETRON 2 MG/ML
INJECTION INTRAMUSCULAR; INTRAVENOUS
Status: COMPLETED
Start: 2025-04-10 | End: 2025-04-10

## 2025-04-10 RX ORDER — MAGNESIUM SULFATE IN WATER 40 MG/ML
2000 INJECTION, SOLUTION INTRAVENOUS ONCE
Status: DISCONTINUED | OUTPATIENT
Start: 2025-04-10 | End: 2025-04-10

## 2025-04-10 RX ORDER — MANNITOL 20 G/100ML
0.25 INJECTION, SOLUTION INTRAVENOUS ONCE
Status: DISCONTINUED | OUTPATIENT
Start: 2025-04-10 | End: 2025-04-10

## 2025-04-10 RX ORDER — PHENYLEPHRINE HYDROCHLORIDE 10 MG/ML
INJECTION INTRAVENOUS
Status: DISCONTINUED | OUTPATIENT
Start: 2025-04-10 | End: 2025-04-10 | Stop reason: SDUPTHER

## 2025-04-10 RX ORDER — DEXAMETHASONE SODIUM PHOSPHATE 4 MG/ML
4 INJECTION, SOLUTION INTRA-ARTICULAR; INTRALESIONAL; INTRAMUSCULAR; INTRAVENOUS; SOFT TISSUE EVERY 6 HOURS
Status: DISCONTINUED | OUTPATIENT
Start: 2025-04-10 | End: 2025-04-13

## 2025-04-10 RX ORDER — PROPOFOL 10 MG/ML
5-50 INJECTION, EMULSION INTRAVENOUS CONTINUOUS
Status: DISCONTINUED | OUTPATIENT
Start: 2025-04-10 | End: 2025-04-11

## 2025-04-10 RX ORDER — IOPAMIDOL 755 MG/ML
75 INJECTION, SOLUTION INTRAVASCULAR
Status: COMPLETED | OUTPATIENT
Start: 2025-04-10 | End: 2025-04-10

## 2025-04-10 RX ORDER — SODIUM CHLORIDE 234 MG/ML
30 INJECTION, SOLUTION INTRAVENOUS ONCE
Status: COMPLETED | OUTPATIENT
Start: 2025-04-10 | End: 2025-04-10

## 2025-04-10 RX ORDER — FENTANYL CITRATE 50 UG/ML
INJECTION, SOLUTION INTRAMUSCULAR; INTRAVENOUS
Status: COMPLETED
Start: 2025-04-10 | End: 2025-04-10

## 2025-04-10 RX ORDER — ONDANSETRON 4 MG/1
4 TABLET, ORALLY DISINTEGRATING ORAL EVERY 8 HOURS PRN
Status: DISCONTINUED | OUTPATIENT
Start: 2025-04-10 | End: 2025-04-11

## 2025-04-10 RX ORDER — GINSENG 100 MG
CAPSULE ORAL PRN
Status: DISCONTINUED | OUTPATIENT
Start: 2025-04-10 | End: 2025-04-10 | Stop reason: ALTCHOICE

## 2025-04-10 RX ORDER — ACETAMINOPHEN 650 MG
TABLET, EXTENDED RELEASE ORAL PRN
Status: DISCONTINUED | OUTPATIENT
Start: 2025-04-10 | End: 2025-04-10 | Stop reason: ALTCHOICE

## 2025-04-10 RX ORDER — SODIUM CHLORIDE 9 MG/ML
INJECTION, SOLUTION INTRAVENOUS CONTINUOUS
Status: DISCONTINUED | OUTPATIENT
Start: 2025-04-10 | End: 2025-04-16

## 2025-04-10 RX ORDER — PROPOFOL 10 MG/ML
INJECTION, EMULSION INTRAVENOUS
Status: COMPLETED
Start: 2025-04-10 | End: 2025-04-10

## 2025-04-10 RX ORDER — NALOXONE HYDROCHLORIDE 1 MG/ML
INJECTION INTRAMUSCULAR; INTRAVENOUS; SUBCUTANEOUS
Status: COMPLETED
Start: 2025-04-10 | End: 2025-04-10

## 2025-04-10 RX ORDER — CYCLOBENZAPRINE HCL 10 MG
10 TABLET ORAL 3 TIMES DAILY PRN
Status: DISCONTINUED | OUTPATIENT
Start: 2025-04-10 | End: 2025-04-11

## 2025-04-10 RX ORDER — DEXAMETHASONE SODIUM PHOSPHATE 10 MG/ML
10 INJECTION, SOLUTION INTRAMUSCULAR; INTRAVENOUS ONCE
Status: COMPLETED | OUTPATIENT
Start: 2025-04-10 | End: 2025-04-10

## 2025-04-10 RX ORDER — SODIUM CHLORIDE, SODIUM LACTATE, POTASSIUM CHLORIDE, CALCIUM CHLORIDE 600; 310; 30; 20 MG/100ML; MG/100ML; MG/100ML; MG/100ML
INJECTION, SOLUTION INTRAVENOUS
Status: DISCONTINUED | OUTPATIENT
Start: 2025-04-10 | End: 2025-04-10 | Stop reason: SDUPTHER

## 2025-04-10 RX ORDER — ONDANSETRON 2 MG/ML
4 INJECTION INTRAMUSCULAR; INTRAVENOUS EVERY 6 HOURS PRN
Status: DISCONTINUED | OUTPATIENT
Start: 2025-04-10 | End: 2025-04-11

## 2025-04-10 RX ORDER — NICARDIPINE HYDROCHLORIDE 0.1 MG/ML
2.5-15 INJECTION INTRAVENOUS CONTINUOUS
Status: DISCONTINUED | OUTPATIENT
Start: 2025-04-10 | End: 2025-04-11

## 2025-04-10 RX ORDER — ONDANSETRON 2 MG/ML
4 INJECTION INTRAMUSCULAR; INTRAVENOUS EVERY 6 HOURS PRN
Status: DISCONTINUED | OUTPATIENT
Start: 2025-04-10 | End: 2025-04-18 | Stop reason: HOSPADM

## 2025-04-10 RX ORDER — FENTANYL CITRATE 50 UG/ML
100 INJECTION, SOLUTION INTRAMUSCULAR; INTRAVENOUS ONCE
Status: COMPLETED | OUTPATIENT
Start: 2025-04-10 | End: 2025-04-10

## 2025-04-10 RX ORDER — MORPHINE SULFATE 2 MG/ML
4 INJECTION, SOLUTION INTRAMUSCULAR; INTRAVENOUS
Status: DISCONTINUED | OUTPATIENT
Start: 2025-04-10 | End: 2025-04-11

## 2025-04-10 RX ORDER — ROCURONIUM BROMIDE 10 MG/ML
INJECTION, SOLUTION INTRAVENOUS
Status: DISCONTINUED | OUTPATIENT
Start: 2025-04-10 | End: 2025-04-10 | Stop reason: SDUPTHER

## 2025-04-10 RX ORDER — ONDANSETRON 2 MG/ML
4 INJECTION INTRAMUSCULAR; INTRAVENOUS ONCE
Status: COMPLETED | OUTPATIENT
Start: 2025-04-10 | End: 2025-04-10

## 2025-04-10 RX ORDER — MAGNESIUM HYDROXIDE 1200 MG/15ML
LIQUID ORAL CONTINUOUS PRN
Status: COMPLETED | OUTPATIENT
Start: 2025-04-10 | End: 2025-04-10

## 2025-04-10 RX ADMIN — FENTANYL CITRATE 50 MCG: 50 INJECTION, SOLUTION INTRAMUSCULAR; INTRAVENOUS at 12:31

## 2025-04-10 RX ADMIN — SODIUM CHLORIDE, PRESERVATIVE FREE 10 ML: 5 INJECTION INTRAVENOUS at 10:50

## 2025-04-10 RX ADMIN — FENTANYL CITRATE 50 MCG: 50 INJECTION, SOLUTION INTRAMUSCULAR; INTRAVENOUS at 16:47

## 2025-04-10 RX ADMIN — FENTANYL CITRATE 25 MCG: 50 INJECTION, SOLUTION INTRAMUSCULAR; INTRAVENOUS at 16:31

## 2025-04-10 RX ADMIN — LEVETIRACETAM 1000 MG: 100 INJECTION, SOLUTION INTRAVENOUS at 14:01

## 2025-04-10 RX ADMIN — NICARDIPINE HYDROCHLORIDE 5 MG/HR: 0.1 INJECTION INTRAVENOUS at 07:53

## 2025-04-10 RX ADMIN — PHENYLEPHRINE HYDROCHLORIDE 25 MCG/MIN: 10 INJECTION INTRAVENOUS at 15:17

## 2025-04-10 RX ADMIN — PHENYLEPHRINE HYDROCHLORIDE 100 MCG: 10 INJECTION INTRAVENOUS at 14:42

## 2025-04-10 RX ADMIN — MANNITOL 72.6 G: 20 INJECTION, SOLUTION INTRAVENOUS at 12:23

## 2025-04-10 RX ADMIN — ONDANSETRON 4 MG: 2 INJECTION, SOLUTION INTRAMUSCULAR; INTRAVENOUS at 08:15

## 2025-04-10 RX ADMIN — NICARDIPINE HYDROCHLORIDE 15 MG/HR: 0.1 INJECTION INTRAVENOUS at 19:44

## 2025-04-10 RX ADMIN — NICARDIPINE HYDROCHLORIDE 10 MG/HR: 0.1 INJECTION INTRAVENOUS at 23:11

## 2025-04-10 RX ADMIN — NICARDIPINE HYDROCHLORIDE 5 MG/HR: 0.1 INJECTION INTRAVENOUS at 18:11

## 2025-04-10 RX ADMIN — NALOXONE HYDROCHLORIDE 2 MG: 1 INJECTION PARENTERAL at 07:35

## 2025-04-10 RX ADMIN — DEXAMETHASONE SODIUM PHOSPHATE 4 MG: 4 INJECTION INTRA-ARTICULAR; INTRALESIONAL; INTRAMUSCULAR; INTRAVENOUS; SOFT TISSUE at 23:11

## 2025-04-10 RX ADMIN — SODIUM CHLORIDE: 0.9 INJECTION, SOLUTION INTRAVENOUS at 19:40

## 2025-04-10 RX ADMIN — Medication 10 MG: at 20:50

## 2025-04-10 RX ADMIN — SODIUM CHLORIDE: 0.9 INJECTION, SOLUTION INTRAVENOUS at 18:13

## 2025-04-10 RX ADMIN — MAGNESIUM SULFATE HEPTAHYDRATE 2000 MG: 40 INJECTION, SOLUTION INTRAVENOUS at 18:21

## 2025-04-10 RX ADMIN — Medication 5 MG: at 17:00

## 2025-04-10 RX ADMIN — FENTANYL CITRATE 25 MCG: 50 INJECTION, SOLUTION INTRAMUSCULAR; INTRAVENOUS at 14:12

## 2025-04-10 RX ADMIN — DEXAMETHASONE SODIUM PHOSPHATE 4 MG: 4 INJECTION INTRA-ARTICULAR; INTRALESIONAL; INTRAMUSCULAR; INTRAVENOUS; SOFT TISSUE at 18:13

## 2025-04-10 RX ADMIN — ONDANSETRON 4 MG: 2 INJECTION INTRAMUSCULAR; INTRAVENOUS at 08:15

## 2025-04-10 RX ADMIN — SODIUM CHLORIDE: 0.9 INJECTION, SOLUTION INTRAVENOUS at 23:25

## 2025-04-10 RX ADMIN — MORPHINE SULFATE 4 MG: 2 INJECTION, SOLUTION INTRAMUSCULAR; INTRAVENOUS at 20:35

## 2025-04-10 RX ADMIN — DEXAMETHASONE SODIUM PHOSPHATE 10 MG: 10 INJECTION, SOLUTION INTRAMUSCULAR; INTRAVENOUS at 09:17

## 2025-04-10 RX ADMIN — ROCURONIUM BROMIDE 30 MG: 10 INJECTION INTRAVENOUS at 14:28

## 2025-04-10 RX ADMIN — POTASSIUM CHLORIDE 20 MEQ: 29.8 INJECTION INTRAVENOUS at 18:36

## 2025-04-10 RX ADMIN — VASOPRESSIN 120 MEQ: 20 INJECTION, SOLUTION INTRAVENOUS at 13:00

## 2025-04-10 RX ADMIN — PROPOFOL 20 MCG/KG/MIN: 10 INJECTION, EMULSION INTRAVENOUS at 09:18

## 2025-04-10 RX ADMIN — FENTANYL CITRATE 100 MCG: 50 INJECTION, SOLUTION INTRAMUSCULAR; INTRAVENOUS at 09:20

## 2025-04-10 RX ADMIN — POTASSIUM CHLORIDE 20 MEQ: 29.8 INJECTION INTRAVENOUS at 19:42

## 2025-04-10 RX ADMIN — NICARDIPINE HYDROCHLORIDE 15 MG/HR: 0.1 INJECTION INTRAVENOUS at 12:20

## 2025-04-10 RX ADMIN — NICARDIPINE HYDROCHLORIDE 10 MG/HR: 0.1 INJECTION INTRAVENOUS at 21:06

## 2025-04-10 RX ADMIN — PROPOFOL 70 MG: 10 INJECTION, EMULSION INTRAVENOUS at 13:25

## 2025-04-10 RX ADMIN — ROCURONIUM BROMIDE 20 MG: 10 INJECTION INTRAVENOUS at 13:41

## 2025-04-10 RX ADMIN — POTASSIUM CHLORIDE 10 MEQ: 7.46 INJECTION, SOLUTION INTRAVENOUS at 09:28

## 2025-04-10 RX ADMIN — ROCURONIUM BROMIDE 50 MG: 10 INJECTION INTRAVENOUS at 13:05

## 2025-04-10 RX ADMIN — SODIUM CHLORIDE, PRESERVATIVE FREE 40 MG: 5 INJECTION INTRAVENOUS at 18:31

## 2025-04-10 RX ADMIN — GADOTERIDOL 20 ML: 279.3 INJECTION, SOLUTION INTRAVENOUS at 10:49

## 2025-04-10 RX ADMIN — MANNITOL 72.6 G: 20 INJECTION, SOLUTION INTRAVENOUS at 08:36

## 2025-04-10 RX ADMIN — PHENYLEPHRINE HYDROCHLORIDE 100 MCG: 10 INJECTION INTRAVENOUS at 14:52

## 2025-04-10 RX ADMIN — IOPAMIDOL 75 ML: 755 INJECTION, SOLUTION INTRAVENOUS at 07:36

## 2025-04-10 RX ADMIN — NALOXONE HYDROCHLORIDE 2 MG: 1 INJECTION INTRAMUSCULAR; INTRAVENOUS; SUBCUTANEOUS at 07:35

## 2025-04-10 RX ADMIN — Medication 5 MG: at 17:03

## 2025-04-10 RX ADMIN — SODIUM CHLORIDE, POTASSIUM CHLORIDE, SODIUM LACTATE AND CALCIUM CHLORIDE: 600; 310; 30; 20 INJECTION, SOLUTION INTRAVENOUS at 13:05

## 2025-04-10 ASSESSMENT — PULMONARY FUNCTION TESTS
PIF_VALUE: 20
PIF_VALUE: 19
PIF_VALUE: 18
PIF_VALUE: 21
PIF_VALUE: 20
PIF_VALUE: 17

## 2025-04-10 NOTE — PROGRESS NOTES
Charting Intubations and reintubations    ETT Size: 7.5  ETT placement: 21cm at the lip    Tube placement verified by:   Auscultation: Y  Positive color change on ETCO2 detector: Y  CXR: To Follow    Reason for intubation: AW Protection    Intubated by: Dr. Carlisle

## 2025-04-10 NOTE — ED NOTES
Pt intubated with 7.5 ETT 21cm at the lips by Dr. Carlisle.   Bilateral breath sounds noted by Dr. Hines, Positive color change noted

## 2025-04-10 NOTE — PROGRESS NOTES
04/10/25 0925   ETT    Placement Date/Time: 04/10/25 0900   Present on Admission/Arrival: No  Placed By: In ED;Licensed provider  Placement Verified By: Auscultation;Capnometry;Chest X-ray;Colorimetric ETCO2 device  Preoxygenation: Yes  Mask Ventilation: Mask ventilation no...   Secured At (S)  23 cm   Measured From (S)  Lips   ETT Placement Center   Secured By Commercial tube carrasco   Site Assessment Dry     ETT advanced 2cm from 21cm at the lip to 23cm at the lip post CXR per Dr Hines.

## 2025-04-10 NOTE — CODE DOCUMENTATION
Pt arrived to ED24 via Medic 5.   EMS states the pt was at home getting ready for work, when she came back inside and started complaining of a severe headache. EMS said the  stated the pt was screaming when she started to have gargled speech and weakness throughout her limbs.  states the pt then fell to the ground. EMS states the pt was initially conscious then on arrival pt became unresponsive to voice and pain.  EMS states the pt has a history of being bradycardic.   On arrival pt is unresponsive to painful and verbal stimuli. Pt has a slight facial droop and flaccid on right side.  Stroke alert critical called PTA. Neuro team at bedside  Pt placed on cont cardiac monitor, ekg completed, iv established, labs drawn, labeled and will send to lab via orders

## 2025-04-10 NOTE — ANESTHESIA PRE PROCEDURE
MD Carter at [facility]       Social History:    Social History     Tobacco Use   • Smoking status: Never   • Smokeless tobacco: Never   Substance Use Topics   • Alcohol use: No                                Counseling given: Not Answered      Vital Signs (Current):   Vitals:    04/10/25 0944 04/10/25 0959 04/10/25 1014 04/10/25 1145   BP: 123/70 123/70 102/70    Pulse: 67 58 (!) 49 66   Resp: 18 18 18 18   Temp: (!) 95.7 °F (35.4 °C) (!) 96 °F (35.6 °C) (!) 96 °F (35.6 °C)    SpO2: 100% 100% 100% 100%   Weight:       Height:                                                  BP Readings from Last 3 Encounters:   04/10/25 102/70   07/31/24 (!) 130/95   10/07/23 (!) 141/100       NPO Status:                                                                                 BMI:   Wt Readings from Last 3 Encounters:   04/10/25 72.6 kg (160 lb)   02/19/25 72.6 kg (160 lb)   07/31/24 72.6 kg (160 lb)     Body mass index is 25.06 kg/m².    CBC:   Lab Results   Component Value Date/Time    WBC 8.5 04/10/2025 07:34 AM    RBC 4.39 04/10/2025 07:34 AM    RBC 4.30 07/18/2023 08:24 AM    HGB 12.0 04/10/2025 07:34 AM    HCT 36.4 04/10/2025 07:34 AM    MCV 82.9 04/10/2025 07:34 AM    RDW 11.9 04/10/2025 07:34 AM     04/10/2025 07:34 AM       CMP:   Lab Results   Component Value Date/Time     04/10/2025 07:34 AM    K 2.9 04/10/2025 07:34 AM     04/10/2025 07:34 AM    CO2 20 04/10/2025 07:34 AM    BUN 11 04/10/2025 07:34 AM    CREATININE 0.8 04/10/2025 07:34 AM    CREATININE 0.8 04/10/2025 07:30 AM    GFRAA 120.0 09/11/2024 09:14 AM    LABGLOM >90 04/10/2025 07:34 AM    GLUCOSE 210 04/10/2025 07:34 AM    GLUCOSE 98 09/11/2024 09:14 AM    CALCIUM 9.5 04/10/2025 07:34 AM    BILITOT 0.3 07/31/2024 07:53 PM    ALKPHOS 115 07/31/2024 07:53 PM    AST 19 07/31/2024 07:53 PM    ALT 16 07/31/2024 07:53 PM       POC Tests:   Recent Labs     04/10/25  0730   POCGLU 225*   POCNA 144   POCK 2.7*   POCCL 105   POCBUN 12

## 2025-04-10 NOTE — PLAN OF CARE
Problem: Respiratory - Adult  Goal: Achieves optimal ventilation and oxygenation  4/10/2025 1953 by Cecile Lopez RCP  Outcome: Progressing  Problem: OXYGENATION/RESPIRATORY FUNCTION  Goal: Patient will maintain patent airway  Outcome: Ongoing  Goal: Patient will achieve/maintain normal respiratory rate/effort  Respiratory rate and effort will be within normal limits for the patient  Outcome: Ongoing    Problem: MECHANICAL VENTILATION  Goal: Patient will maintain patent airway  Outcome: Ongoing  Goal: Oral health is maintained or improved  Outcome: Ongoing  Goal: ET tube will be managed safely  Outcome: Ongoing  Goal: Ability to express needs and understand communication  Outcome: Ongoing  Goal: Mobility/activity is maintained at optimum level for patient  Outcome: Ongoing    Problem: ASPIRATION PRECAUTIONS  Goal: Patient’s risk of aspiration is minimized  Outcome: Ongoing    Problem: SKIN INTEGRITY  Goal: Skin integrity is maintained or improved  Outcome: Ongoing

## 2025-04-10 NOTE — ED PROVIDER NOTES
[facility]     Emergency Department     Faculty Attestation    I performed a history and physical examination of the patient and discussed management with the resident. I have reviewed and agree with the resident’s findings including all diagnostic interpretations, and treatment plans as written at the time of my review. Any areas of disagreement are noted on the chart. I was personally present for the key portions of any procedures. I have documented in the chart those procedures where I was not present during the key portions. For Physician Assistant/ Nurse Practitioner cases/documentation I have personally evaluated this patient and have completed at least one if not all key elements of the E/M (history, physical exam, and MDM). Additional findings are as noted.    PtName: Jack Gómez  MRN: [id number]  Birthdate 1985  Date of evaluation: 4/10/25  Note Started: 7:35 AM EDT    Primary Care Physician: Debbie Villareal MD        History: This is a 39 y.o. female who presents to the Emergency Department with complaint of altered mental status.  Last known well was approximately 7 AM.  EMS patient was complaining of a severe headache at work and then \"went down\".  Per EMS serum glucose was in the 180s    Physical:   blood pressure is 170/88 (abnormal) and her pulse is 44 (abnormal). Her respiration is 10 and oxygen saturation is 95%.  Patient is very somnolent occasional open eyes to verbal stimulus she is globally flaccid.  Heart is bradycardic    Impression: Altered mental status    Plan: Stroke alert critical was paged out on patient prior to arrival.  CT scans x-rays EKG CBC, CMP, ammonia,      EKG Interpretation    Interpreted by me  Sinus bradycardia with ventricular to 50, normal RI interval, normal QRS duration, septal infarct, age determined, normal QT corrected  Compared to EKG of July 31, 2024, ventricular is decreased by 39 right axis

## 2025-04-10 NOTE — ED NOTES
The following labs were labeled with appropriate pt sticker and tubed to lab:     [x] Blue     [x] Lavender   [] on ice  [x] Green/yellow  [x] Green/black [] on ice  [] Hidalgo  [] on ice  [x] Yellow  [x] Red  [] Pink  [] Type/ Screen  [] ABG  [] VBG    [] COVID-19 swab    [] Rapid  [] PCR  [] Flu swab  [] Peds Viral Panel     [] Urine Sample  [] Fecal Sample  [] Pelvic Cultures  [] Blood Cultures  [] X 2  [] STREP Cultures  [] Wound Cultures

## 2025-04-10 NOTE — OP NOTE
Operative Note      Patient: Jack Gómez  YOB: 1985  MRN: [id number]    Date of Procedure: 4/10/2025    Diagnosis.  Spontaneous left-sided intracerebral hematoma with surrounding vasogenic edema midline shift and mass effect on the brain.    Post-Op Diagnosis: Same       Indications for the procedure.  Patient with large left side intracerebral hematoma with significant mass effect midline shift of the brain and uncal herniation that was reversible.  Decision made to proceed with urgent evacuation of the hematoma and possible resection of mass if indicated.  Risk benefits discussed with the family.    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  Use of spinal neuronavigation  Use of operative microscope    Surgeon(s):  Amanda Palmer DO    Assistant:   Physician Assistant: Miguel Hughes PA-C    Anesthesia: General    Estimated Blood Loss (mL): 200     Complications: None    Specimens:   ID Type Source Tests Collected by Time Destination   A : LEFT TEMPORAL MASS Tissue Brain SURGICAL PATHOLOGY Amanda Palmer DO 4/10/2025 1531    B : LEFT TEMPORAL HEMATOMA Tissue Brain SURGICAL PATHOLOGY Amanda Palmer DO 4/10/2025 1458    C : LEFT TEMPORAL HEMATOMA #2 Tissue Brain SURGICAL PATHOLOGY Amanda Palmer  4/10/2025 1533        Implants:  Implant Name Type Inv. Item Serial No.  Lot No. LRB No. Used Action   GRAFT DURA [id number] ULTRAPURE DURAGN + - [id number]  GRAFT DURA [id number] ULTRAPURE DURAGN +  [employer] LUX- [id number] Left 1 Implanted         Drains:   Closed/Suction Drain Left Scalp Bulb (Active)   Output (ml) 40 ml 04/10/25 1857       NG/OG/NJ/NE Tube Orogastric 16 fr Right mouth (Active)       Urinary Catheter 04/10/25 Celaya-Temperature (Active)   $ Urethral catheter insertion $ Not inserted for procedure 04/10/25 0852   Output (mL) 500 mL 04/10/25 1857       Findings:  Infection Present At Time Of Surgery

## 2025-04-10 NOTE — ED NOTES
Dr. Carlisle at bedside preparing to intubate.   Rt at bedside, Dr. Hines at bedside, ALENA Carrington at bedside

## 2025-04-10 NOTE — PROGRESS NOTES
Bone flap removed at 1557. Packaged in bone flap kit per 's instructions at 1602. Bone flap labeled with patient sticker. Order placed for storage. Bone flap placed in freezer at 1604.

## 2025-04-10 NOTE — PROCEDURES
PROCEDURE NOTE  Date: 4/10/2025   Name: Jack Gómez  YOB: 1985    CENTRAL LINE    Date/Time: 4/10/2025 4:08 PM    Performed by: Saumya Linn APRN - CNP  Authorized by: Saumya Linn APRN - CNP  Consent: The procedure was performed in an emergent situation.  Time out: Immediately prior to procedure a \"time out\" was called to verify the correct patient, procedure, equipment, support staff and site/side marked as required.  Indications: vascular access  Preparation: skin prepped with 2% chlorhexidine and skin prepped with ChloraPrep  Skin prep agent dried: skin prep agent completely dried prior to procedure  Sterile barriers: all five maximum sterile barriers used - cap, mask, sterile gown, sterile gloves, and large sterile sheet  Hand hygiene: hand hygiene performed prior to central venous catheter insertion  Location details: right femoral  Catheter type: triple lumen  Catheter size: 7 Fr  Pre-procedure: landmarks identified  Ultrasound guidance: yes  Number of attempts: 1  Successful placement: yes  Post-procedure: line sutured  Patient tolerance: patient tolerated the procedure well with no immediate complications        ALEX Rinaldi CNP  Neuro Critical Care  04/10/25 4:09 PM

## 2025-04-10 NOTE — SIGNIFICANT EVENT
Patient arrived to the Neuro ICU following MRI and was noted to have left pupil dilated ~4.5mm and non-reactive to light.  Hyperventilation started.  HOB elevated further.  Neurosurgery notified.  Right femoral CVC placed emergently.  23.4% hypertonic saline pushed.  Additional 1g/kg Mannitol started.  Left pupil returned to 2mm, briskly reactive.  Patient taken urgently to OR with plans for hematoma evacuation.    ALEX Rinaldi - CNP  Neuro Critical Care  04/10/25 4:21 PM

## 2025-04-10 NOTE — H&P
Neuro ICU History & Physical    Patient Name: Jack Gómez  Patient : 1985  Room/Bed:   Code Status: Full  Allergies:   Allergies   Allergen Reactions    Latex Itching    Reglan [Metoclopramide] Anxiety       CHIEF COMPLAINT     headache    HPI    History Obtained From:     39 female with past medical history of hysterectomy currently taking estradiol presented to ED as a critical stroke alert.  She works at [employer] and was trying to get ready for work when she had sudden onset slurring of speech, aphasia and she went flaccid and fell down.  She was brought to ED via EMS.  On arrival to ED, she had GCS of 9, E2, V3 M4 and was not following any commands.  She was immediately taken to the scanner and was found to have left posterior temporal lobe hemorrhage with left-to-right midline shift of 8 mm with partial effacement of suprasellar cistern.  She does not take any blood thinners at home.  Post scanner, her exam improved and she started having spontaneous movement of the left side and right lower extremity.  SBP at the time of presentation was 170.  She was started on Cardene drip and mannitol 1 g/kg bolus was administered.  Neurosurgery and neurocritical care were consulted.  Due to concerns of of that bleed likely secondary to mass versus venous infarct, MRI brain and MRV with and without contrast ordered and patient is electively intubated for that.  After the MRI was done the patient was brought back to the unit she experienced an episode of left pupil dilation at 4.2 mm unreactive.  She was emergently given mannitol 1 g/kg and saline but 23.4% through a central line placed emergently on the spot.  CO2 was maintained between 35 and 40 the head of the bed was set up.  She was then taken emergently to surgery        Admitted to ICU From:   Reason for ICU Admission:        PATIENT HISTORY   Past Medical History:        Diagnosis Date    Anxiety     GERD (gastroesophageal  structure is no longer present on the   additional images and NG tube has been passed since the earlier images.         Final   There is cylindrical structure about 10 cm in length and 4 mm wide projecting   in the left upper abdomen of uncertain etiology. Part of it is radiopaque.   Indeterminate on this single AP view whether this is within or external to   the patient. Please correlate with direct inspection.      Otherwise no evidence for metallic foreign body within the patient.         CT HEAD WO CONTRAST   Final Result   Acute intraparenchymal hemorrhage in the posterior left temporal lobe   measuring 6.0 x 4.2 x 4.0 cm with surrounding edema.  There is left-to-right   midline shift measuring 8 mm with partial effacement of the suprasellar   cistern.      Results discussed with Dr. Carlisle at 0803 hours on 4/10/2025.         CTA HEAD NECK W CONTRAST   Final Result   1. No evidence of active bleeding.   2. 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   3. Intraparenchymal hemorrhage in the posterior left temporal lobe with   associated left-to-right midline shift, not appreciably changed.         CT HEAD WO CONTRAST    (Results Pending)   MRI BRAIN W WO CONTRAST    (Results Pending)         ASSESSMENT AND PLAN:         ASSESSMENT:   39-year-old female with history of hysterectomy previously on estradiol presented as stroke alert with sudden aphasia, slurred speech, and collapse. CT showed left temporal lobe hemorrhage with 8 mm midline shift. GCS was 9. Cardene and mannitol given. MRI/MRV ordered. Post-imaging, left pupil became dilated and unreactive. Mannitol and hypertonic saline administered. CO? maintained at 35-40, head elevated. Emergent neurosurgery performed. No history of blood thinners.      PLAN/MEDICAL DECISION MAKING:      NEUROLOGIC:    - Neuro checks per protocol    CARDIOVASCULAR:  - Continue telemetry    PULMONARY:  - Keep O2 sats > 92%    RENAL/FLUID/ELECTROLYTE:  Recent Labs

## 2025-04-10 NOTE — CONSULTS
Department of Neurosurgery                                            Nurse Practitioner Consult Note      Reason for Consult:  brain mass vs venous infarct   Requesting Physician:    Neurosurgeon:   [x] Dr. Palmer  [] Dr. Jaime  [] Dr. France  [] Dr. Ramirez    Neurosurgery notified of consult 0815  Neurosurgery arrival to bedside 0820    History Obtained From:  electronic medical record    CHIEF COMPLAINT:         Chief Complaint   Patient presents with    Cerebrovascular Accident       HISTORY OF PRESENT ILLNESS:       The patient is a 39 y.o. female who has history of hysterectomy currently on Estradiol who presents with aphasia this morning prior to going to work and she had an apparent fall. EMS was called and she was taken to [facility]. She had CT head completed showing left posterior temporal lobe hemorrhage with surrounding edema and left to right midline shift. She is very drowsy but opens eyes briefly to voice. She is complaining of a headache, Initial GCS 12. Initially she was unable to move all extremities which improved in the ED and is able to move left side, she was started on Cardene drip as well for SBP control  (170 initial SBP)  PAST MEDICAL HISTORY :       Past Medical History:        Diagnosis Date    Anxiety     GERD (gastroesophageal reflux disease)     Nausea     PONV (postoperative nausea and vomiting)     Ulcer of gastric fundus        Past Surgical History:        Procedure Laterality Date    CLEFT LIP REPAIR      COSMETIC SURGERY      rinoplasty    HYSTERECTOMY (CERVIX STATUS UNKNOWN)      OVARY REMOVAL Left     TUBAL LIGATION Bilateral 20134    UPPER GASTROINTESTINAL ENDOSCOPY  12/2/15    UPPER GASTROINTESTINAL ENDOSCOPY N/A 5/7/2021    EGD BIOPSY performed by Ruddy Ivan MD at [facility] OR    UPPER GASTROINTESTINAL ENDOSCOPY N/A 8/3/2021    EGD BIOPSY performed by Diego Machado MD at [facility]       Social History:   Social History     Socioeconomic History     for left temporal craniotomy - spoke with family regarding plan    Neuro checks per protocol  Hold all antiplatelets and anticoagulants  Additional recommendations may follow    Please contact neurosurgery with any changes in patients neurologic status.     Thank you for your consult.       ALEX Larsen - NP     Neuroscience [place]   4/10/2025  7:49 AM

## 2025-04-10 NOTE — PROGRESS NOTES
[facility]Trauma [place]    PATIENT NAME: Jack Gómez    Shift date: 04/10/2025  Shift day: Thursday   Shift # 1    Room # 24/24     Name: Jack Gómez            Age: 39 y.o.  Gender: female          Hoahaoism: [facility]   Place of Sikh:     Trauma/Incident type: Stroke Alert/Aneurism  Admit Date & Time: 4/10/2025  7:28 AM  TRAUMA NAME: None    ADVANCE DIRECTIVES IN CHART?  No    NAME OF DECISION MAKER:     RELATIONSHIP OF DECISION MAKER TO PATIENT:     PATIENT/EVENT DESCRIPTION:  Jack óGmez is a 39 y.o. female who arrived via ground ambulance as stroke alert. On arrival, patient was diagnosed with brain aneurism. Patient to be admitted to 24/24.      SPIRITUAL ASSESSMENT-INTERVENTION-OUTCOME:  No spiritual assessment was carried out because patient was not communicating. However, family was present and open to spiritual care.  provided ministry of presence, offered support, prayed with family and reassured them that patient was in good hands. Family expressed appreciation for the spiritual and emotional support they received.      PATIENT BELONGINGS:  No belongings noted    ANY BELONGINGS OF SIGNIFICANT VALUE NOTED:  Unknown    REGISTRATION STAFF NOTIFIED?  Yes    WHAT IS YOUR SPIRITUAL CARE PLAN FOR THIS PATIENT?:  Follow up visist recommended for ongoing assessment of patient's condition and for more prayers and support.     Electronically signed by Chaplain Ting, on 4/10/2025 at 9:01 AM.  [facility]  [phone]

## 2025-04-10 NOTE — ED NOTES
Report given to True, Neuro ICU RN.   All questions answered. Pt to go to MRI, True meeting writer and Pt in MRI when ready.

## 2025-04-10 NOTE — SIGNIFICANT EVENT
Neurosurgery was informed that patients left pupil was 4mmg nonreactive and right was 3mm reactive  She is not breathing above the ventilator at this time  She was given 1g/kg Mannitol  Arterial line being place by NCC currently   She appeared to localize still with LUE to painful stimulation while the A line was being placed     Using pupilometer her pupils right was brisk and 2.68mm and left was also brisk 3.07mm in size   Consent was obtained from mother fir emergent left craniotomy for hematoma evacuation     Electronically signed by ALEX Larsen NP on 4/10/2025 at 12:49 PM

## 2025-04-10 NOTE — CARE COORDINATION
Attempt made to see patient for initial interview.  Patient currently intubated, no family available, will try again as able

## 2025-04-10 NOTE — CONSULTS
Stroke Neurology Consult Note  Stroke Alert @ 07:24 AM  Arrival at bedside @ 07:26 AM    Reason for Consult:  ED Stroke Alert  Requesting Physician:  ED team   Endovascular Neurosurgeon: Alan Hopper MD  Stroke Team: Alan Hopper MD  History Obtained From: electronic medical record  Chief Complaint:  Acute neurological deficits   Allergies:  Latex and Reglan [metoclopramide]    History of Presenting Illness     39 female with past medical history of hysterectomy currently taking estradiol presented to ED as a critical stroke alert.  She works at [employer] and was trying to get ready for work when she had sudden onset slurring of speech, aphasia and she went flaccid and fell down.  She was brought to ED via EMS.  On arrival to ED, she had GCS of 9, E2, V3 M4 and was not following any commands.  She was immediately taken to the scanner and was found to have left posterior temporal lobe hemorrhage with left-to-right midline shift of 8 mm with partial effacement of suprasellar cistern.  She does not take any blood thinners at home.  Post scanner, her exam improved and she started having spontaneous movement of the left side and right lower extremity.  SBP at the time of presentation was 170.  She was started on Cardene drip and mannitol 1 g/kg bolus was administered.  Neurosurgery and neurocritical care were consulted.  Due to concerns of of that bleed likely secondary to mass versus venous infarct, MRI brain and MRV with and without contrast ordered and patient is electively intubated for that.    LKW: 07: 00 AM  NIHSS: 14  Modified Cle[place]ne Scale: pre 0, current 4  SBP: 170  Glucose: 225  CT head without contrast: left posterior temporal lobe hemorrhage with left-to-right midline shift of 8 mm with partial effacement of suprasellar cistern  TNK: Not given  Endovascular: Not consulted    Review of Systems  Unable to obtain  Past Histories          Diagnosis Date    Anxiety     GERD (gastroesophageal reflux  criteria: No LVO on Imaging       Assessment & Plan       39 female with past medical history of hysterectomy currently taking estradiol presented to ED as a critical stroke alert after having sudden onset aphasia, slurring of speech and having flaccidity of all extremity which led to fall.  Initial NIH of 14, was found to have left temporal posterior lobe hemorrhage.      Impression:  Left temporal posterior lobe hemorrhage: Likely secondary to mass with bleed versus venous infarct versus subacute stroke        Disposition: NICU Status - (1B)      Stroke admission order set: [id number] - OPAL Intercerebral Hemorrhage Admission       Plan:   Blood pressure goal: Systolic blood pressure goal of 100 to 140  Cardene drip to maintain SBP goal less than 140  Mannitol 1 g/kg  MRI brain with and without contrast with stealth protocol  MRV head with and without contrast  Neurocritical care and neurosurgery consult      Discussed with Alan Hopper MD.    Shon Fenton MD   4/10/2025  9:54 AM

## 2025-04-10 NOTE — PROGRESS NOTES
[facility]  Speech Language Pathology    Date: 4/10/2025  Patient Name: Jack Gómez  YOB: 1985   AGE: 39 y.o.  MRN: [id number]        Patient Not Available for Speech Therapy     Due to:  [] Testing  [] Hemodialysis  [] Cancelled by RN  [] Surgery   [x] Intubation/Sedation/Pain Medication  [] Medical instability  [] Other:    Next scheduled treatment: 4/11  Completed by: Christiano Gil  Clinician    Cosigned By: Brenda Rose M.S.CCC/SLP

## 2025-04-10 NOTE — ED PROVIDER NOTES
[facility] NEURO ICU  Emergency Department Encounter  Emergency Medicine Resident     Pt Name:Jack Gómez  MRN: [id number]  Birthdate 1985  Date of evaluation: 4/10/25  PCP:  Debbie Villareal MD  Note Started: 10:20 AM EDT    CHIEF COMPLAINT       Chief Complaint   Patient presents with    Cerebrovascular Accident       HISTORY OF PRESENT ILLNESS  (Location/Symptom, Timing/Onset, Context/Setting, Quality, Duration, Modifying Factors, Severity.)      HPI severely limited secondary to decreased level of responsiveness and no family or friends readily available during initial evaluation.    Jack Gómez is a 39 y.o. female who presents via EMS for altered mental status.  Patient is a , was at getting ready for work when she began complaining of a sudden onset headache followed by unresponsiveness.   for EMS.  Patient reportedly did not hit her head, no reported traumatic injury.     Will open eyes to painful stimuli, no movement of extremities.    PAST MEDICAL / SURGICAL / SOCIAL / FAMILY HISTORY      has a past medical history of Anxiety, GERD (gastroesophageal reflux disease), Nausea, PONV (postoperative nausea and vomiting), and Ulcer of gastric fundus.       has a past surgical history that includes Tubal ligation (Bilateral, 20134); Ovary removal (Left); Upper gastrointestinal endoscopy (12/2/15); Hysterectomy; Upper gastrointestinal endoscopy (N/A, 5/7/2021); Cosmetic surgery; Cleft lip repair; Upper gastrointestinal endoscopy (N/A, 8/3/2021); and craniotomy (Left, 4/10/2025).      Social History     Socioeconomic History    Marital status:      Spouse name: Not on file    Number of children: Not on file    Years of education: Not on file    Highest education level: Not on file   Occupational History    Not on file   Tobacco Use    Smoking status: Never    Smokeless tobacco: Never   Vaping Use    Vaping status: Never Used   Substance and Sexual Activity     imaging of head, ammonia, TSH, basic labs, admission.     Amount and/or Complexity of Data Reviewed  Labs: ordered. Decision-making details documented in ED Course.  Radiology: ordered.  ECG/medicine tests: ordered.    Risk  Prescription drug management.  Decision regarding hospitalization.        EMERGENCY DEPARTMENT COURSE:    ED Course as of 04/13/25 0801   Thu Apr 10, 2025   0908 Potassium(!!): 2.9 [BG]   0909 Will replete with 40 oral per OG tube and 20 IV. [BG]      ED Course User Index  [BG] Milo Carlisle MD       PROCEDURES:    PROCEDURE NOTE - EMERGENCY INTUBATION    PATIENT NAME: Jack Gómez  MEDICAL RECORD NO. [id number]  DATE: 4/13/2025  ATTENDING PHYSICIAN: Dr. Hines    PREOPERATIVE DIAGNOSIS:  Acute Respiratory Failure  POSTOPERATIVE DIAGNOSIS:  Same  PROCEDURE PERFORMED:  Emergency endotracheal intubation  PERFORMING PHYSICIAN: Milo Carlisle MD    MEDICATIONS: None    DISCUSSION:  Jack Gómez is a 39 y.o.-year-old female who requires intubation and ventilatory support due to airway protection.  The history and physical examination were reviewed and confirmed.      CONSENT: The spouse was and patient's mother was counseled regarding the procedure, its indications, risks, potential complications and alternatives, and any questions were answered. Consent was obtained to proceed.     PROCEDURE:  A timeout was initiated by the bedside nurse and was confirmed by those present.  The patient was placed in the appropriate position.  Cricoid pressure was not required.  Intubation was performed utilizing video laryngoscopy a 7.5 cuffed endotracheal tube.  The cuff was then inflated and the tube was secured appropriately at a distance of 21 cm to the dental ridge.  Initial confirmation of placement included bilateral breath sounds, an end tidal CO2 detector, and absence of sounds over the stomach.  A chest x-ray to verify correct placement of the tube showed appropriate tube position.    The

## 2025-04-11 ENCOUNTER — APPOINTMENT (OUTPATIENT)
Dept: MRI IMAGING | Age: 40
End: 2025-04-11
Payer: COMMERCIAL

## 2025-04-11 PROBLEM — G93.5 NONTRAUMATIC HEMATOMA OF BRAIN WITH COMPRESSION AND MIDLINE SHIFT OF BRAIN (HCC): Status: ACTIVE | Noted: 2025-04-10

## 2025-04-11 LAB
ANION GAP SERPL CALCULATED.3IONS-SCNC: 13 MMOL/L (ref 9–16)
ANION GAP SERPL CALCULATED.3IONS-SCNC: 14 MMOL/L (ref 9–16)
BACTERIA URNS QL MICRO: NORMAL
BILIRUB UR QL STRIP: NEGATIVE
BUN SERPL-MCNC: 11 MG/DL (ref 6–20)
BUN SERPL-MCNC: 9 MG/DL (ref 6–20)
CALCIUM SERPL-MCNC: 9.1 MG/DL (ref 8.6–10.4)
CALCIUM SERPL-MCNC: 9.3 MG/DL (ref 8.6–10.4)
CASTS #/AREA URNS LPF: NORMAL /LPF (ref 0–8)
CHLORIDE SERPL-SCNC: 113 MMOL/L (ref 98–107)
CHLORIDE SERPL-SCNC: 114 MMOL/L (ref 98–107)
CHOLEST SERPL-MCNC: 155 MG/DL (ref 0–199)
CHOLESTEROL/HDL RATIO: 4.6
CLARITY UR: CLEAR
CO2 SERPL-SCNC: 19 MMOL/L (ref 20–31)
CO2 SERPL-SCNC: 19 MMOL/L (ref 20–31)
COLOR UR: YELLOW
CREAT SERPL-MCNC: 0.7 MG/DL (ref 0.6–0.9)
CREAT SERPL-MCNC: 0.7 MG/DL (ref 0.6–0.9)
EPI CELLS #/AREA URNS HPF: NORMAL /HPF (ref 0–5)
ERYTHROCYTE [DISTWIDTH] IN BLOOD BY AUTOMATED COUNT: 12.5 % (ref 11.8–14.4)
ERYTHROCYTE [DISTWIDTH] IN BLOOD BY AUTOMATED COUNT: 12.7 % (ref 11.8–14.4)
EST. AVERAGE GLUCOSE BLD GHB EST-MCNC: 100 MG/DL
FIO2: 30
GFR, ESTIMATED: >90 ML/MIN/1.73M2
GFR, ESTIMATED: >90 ML/MIN/1.73M2
GLUCOSE SERPL-MCNC: 151 MG/DL (ref 74–99)
GLUCOSE SERPL-MCNC: 169 MG/DL (ref 74–99)
GLUCOSE UR STRIP-MCNC: NEGATIVE MG/DL
HBA1C MFR BLD: 5.1 % (ref 4–6)
HCT VFR BLD AUTO: 32.9 % (ref 36.3–47.1)
HCT VFR BLD AUTO: 34.5 % (ref 36.3–47.1)
HDLC SERPL-MCNC: 34 MG/DL
HGB BLD-MCNC: 10.5 G/DL (ref 11.9–15.1)
HGB BLD-MCNC: 11.2 G/DL (ref 11.9–15.1)
HGB UR QL STRIP.AUTO: NEGATIVE
KETONES UR STRIP-MCNC: NEGATIVE MG/DL
LACTIC ACID, WHOLE BLOOD: 2.2 MMOL/L (ref 0.7–2.1)
LDLC SERPL CALC-MCNC: 91 MG/DL (ref 0–100)
LEUKOCYTE ESTERASE UR QL STRIP: NEGATIVE
MAGNESIUM SERPL-MCNC: 2.5 MG/DL (ref 1.6–2.6)
MCH RBC QN AUTO: 26.9 PG (ref 25.2–33.5)
MCH RBC QN AUTO: 27.3 PG (ref 25.2–33.5)
MCHC RBC AUTO-ENTMCNC: 31.9 G/DL (ref 28.4–34.8)
MCHC RBC AUTO-ENTMCNC: 32.5 G/DL (ref 28.4–34.8)
MCV RBC AUTO: 84.1 FL (ref 82.6–102.9)
MCV RBC AUTO: 84.1 FL (ref 82.6–102.9)
NEGATIVE BASE EXCESS, ART: 2.5 MMOL/L (ref 0–2)
NITRITE UR QL STRIP: NEGATIVE
NRBC BLD-RTO: 0 PER 100 WBC
NRBC BLD-RTO: 0 PER 100 WBC
O2 DELIVERY DEVICE: ABNORMAL
PH UR STRIP: 5.5 [PH] (ref 5–8)
PLATELET # BLD AUTO: 270 K/UL (ref 138–453)
PLATELET # BLD AUTO: 284 K/UL (ref 138–453)
PMV BLD AUTO: 10.2 FL (ref 8.1–13.5)
PMV BLD AUTO: 10.5 FL (ref 8.1–13.5)
POC HCO3: 20.3 MMOL/L (ref 21–28)
POC O2 SATURATION: 99.3 % (ref 94–98)
POC PCO2: 28.3 MM HG (ref 35–48)
POC PH: 7.46 (ref 7.35–7.45)
POC PO2: 136.2 MM HG (ref 83–108)
POTASSIUM SERPL-SCNC: 3.7 MMOL/L (ref 3.7–5.3)
POTASSIUM SERPL-SCNC: 3.8 MMOL/L (ref 3.7–5.3)
PROT UR STRIP-MCNC: NEGATIVE MG/DL
RBC # BLD AUTO: 3.91 M/UL (ref 3.95–5.11)
RBC # BLD AUTO: 4.1 M/UL (ref 3.95–5.11)
RBC #/AREA URNS HPF: NORMAL /HPF (ref 0–4)
SODIUM SERPL-SCNC: 146 MMOL/L (ref 136–145)
SODIUM SERPL-SCNC: 146 MMOL/L (ref 136–145)
SP GR UR STRIP: 1.02 (ref 1–1.03)
TRIGL SERPL-MCNC: 152 MG/DL
UROBILINOGEN UR STRIP-ACNC: NORMAL EU/DL (ref 0–1)
VLDLC SERPL CALC-MCNC: 30 MG/DL (ref 1–30)
WBC #/AREA URNS HPF: NORMAL /HPF (ref 0–5)
WBC OTHER # BLD: 11.3 K/UL (ref 3.5–11.3)
WBC OTHER # BLD: 12.8 K/UL (ref 3.5–11.3)

## 2025-04-11 PROCEDURE — 37799 UNLISTED PX VASCULAR SURGERY: CPT

## 2025-04-11 PROCEDURE — 82803 BLOOD GASES ANY COMBINATION: CPT

## 2025-04-11 PROCEDURE — 6360000002 HC RX W HCPCS: Performed by: NURSE PRACTITIONER

## 2025-04-11 PROCEDURE — 99291 CRITICAL CARE FIRST HOUR: CPT | Performed by: STUDENT IN AN ORGANIZED HEALTH CARE EDUCATION/TRAINING PROGRAM

## 2025-04-11 PROCEDURE — 2580000003 HC RX 258: Performed by: NURSE PRACTITIONER

## 2025-04-11 PROCEDURE — 83605 ASSAY OF LACTIC ACID: CPT

## 2025-04-11 PROCEDURE — 81001 URINALYSIS AUTO W/SCOPE: CPT

## 2025-04-11 PROCEDURE — 2500000003 HC RX 250 WO HCPCS: Performed by: NURSE PRACTITIONER

## 2025-04-11 PROCEDURE — 6360000002 HC RX W HCPCS: Performed by: REGISTERED NURSE

## 2025-04-11 PROCEDURE — 85027 COMPLETE CBC AUTOMATED: CPT

## 2025-04-11 PROCEDURE — 70553 MRI BRAIN STEM W/O & W/DYE: CPT

## 2025-04-11 PROCEDURE — 6360000004 HC RX CONTRAST MEDICATION: Performed by: NURSE PRACTITIONER

## 2025-04-11 PROCEDURE — A9576 INJ PROHANCE MULTIPACK: HCPCS | Performed by: NURSE PRACTITIONER

## 2025-04-11 PROCEDURE — 6370000000 HC RX 637 (ALT 250 FOR IP): Performed by: NURSE PRACTITIONER

## 2025-04-11 PROCEDURE — 1200000000 HC SEMI PRIVATE

## 2025-04-11 PROCEDURE — 80048 BASIC METABOLIC PNL TOTAL CA: CPT

## 2025-04-11 PROCEDURE — 94761 N-INVAS EAR/PLS OXIMETRY MLT: CPT

## 2025-04-11 PROCEDURE — 83735 ASSAY OF MAGNESIUM: CPT

## 2025-04-11 PROCEDURE — 80061 LIPID PANEL: CPT

## 2025-04-11 PROCEDURE — 2000000000 HC ICU R&B

## 2025-04-11 PROCEDURE — 2700000000 HC OXYGEN THERAPY PER DAY

## 2025-04-11 PROCEDURE — 94003 VENT MGMT INPAT SUBQ DAY: CPT

## 2025-04-11 PROCEDURE — 83036 HEMOGLOBIN GLYCOSYLATED A1C: CPT

## 2025-04-11 RX ORDER — ACETAMINOPHEN 650 MG/1
650 SUPPOSITORY RECTAL EVERY 6 HOURS PRN
Status: DISCONTINUED | OUTPATIENT
Start: 2025-04-11 | End: 2025-04-18 | Stop reason: HOSPADM

## 2025-04-11 RX ORDER — ACETAMINOPHEN 650 MG/1
650 SUPPOSITORY RECTAL EVERY 6 HOURS PRN
Status: DISCONTINUED | OUTPATIENT
Start: 2025-04-11 | End: 2025-04-11

## 2025-04-11 RX ORDER — OXYCODONE HYDROCHLORIDE 5 MG/1
10 TABLET ORAL EVERY 4 HOURS PRN
Refills: 0 | Status: DISCONTINUED | OUTPATIENT
Start: 2025-04-11 | End: 2025-04-11

## 2025-04-11 RX ORDER — OXYCODONE HYDROCHLORIDE 5 MG/1
5 TABLET ORAL EVERY 4 HOURS PRN
Refills: 0 | Status: DISCONTINUED | OUTPATIENT
Start: 2025-04-11 | End: 2025-04-11

## 2025-04-11 RX ORDER — ACETAMINOPHEN 325 MG/1
650 TABLET ORAL EVERY 6 HOURS PRN
Status: DISCONTINUED | OUTPATIENT
Start: 2025-04-11 | End: 2025-04-11

## 2025-04-11 RX ORDER — ACETAMINOPHEN 325 MG/1
650 TABLET ORAL EVERY 6 HOURS PRN
Status: DISCONTINUED | OUTPATIENT
Start: 2025-04-11 | End: 2025-04-18 | Stop reason: HOSPADM

## 2025-04-11 RX ORDER — LABETALOL HYDROCHLORIDE 5 MG/ML
10 INJECTION, SOLUTION INTRAVENOUS
Status: DISCONTINUED | OUTPATIENT
Start: 2025-04-11 | End: 2025-04-18 | Stop reason: HOSPADM

## 2025-04-11 RX ORDER — CYCLOBENZAPRINE HCL 10 MG
10 TABLET ORAL 3 TIMES DAILY PRN
Status: DISCONTINUED | OUTPATIENT
Start: 2025-04-11 | End: 2025-04-16

## 2025-04-11 RX ORDER — HYDRALAZINE HYDROCHLORIDE 20 MG/ML
10 INJECTION INTRAMUSCULAR; INTRAVENOUS
Status: DISCONTINUED | OUTPATIENT
Start: 2025-04-11 | End: 2025-04-18 | Stop reason: HOSPADM

## 2025-04-11 RX ORDER — LEVETIRACETAM 500 MG/5ML
500 INJECTION, SOLUTION, CONCENTRATE INTRAVENOUS EVERY 12 HOURS
Status: DISCONTINUED | OUTPATIENT
Start: 2025-04-11 | End: 2025-04-15

## 2025-04-11 RX ORDER — OXYCODONE HYDROCHLORIDE 5 MG/1
10 TABLET ORAL EVERY 4 HOURS PRN
Refills: 0 | Status: DISCONTINUED | OUTPATIENT
Start: 2025-04-11 | End: 2025-04-18 | Stop reason: HOSPADM

## 2025-04-11 RX ORDER — OXYCODONE HYDROCHLORIDE 5 MG/1
5 TABLET ORAL EVERY 4 HOURS PRN
Refills: 0 | Status: DISCONTINUED | OUTPATIENT
Start: 2025-04-11 | End: 2025-04-18 | Stop reason: HOSPADM

## 2025-04-11 RX ADMIN — GADOTERIDOL 14 ML: 279.3 INJECTION, SOLUTION INTRAVENOUS at 09:57

## 2025-04-11 RX ADMIN — SODIUM CHLORIDE, PRESERVATIVE FREE 40 MG: 5 INJECTION INTRAVENOUS at 08:28

## 2025-04-11 RX ADMIN — LEVETIRACETAM 500 MG: 100 INJECTION INTRAVENOUS at 21:20

## 2025-04-11 RX ADMIN — SODIUM CHLORIDE, PRESERVATIVE FREE 10 ML: 5 INJECTION INTRAVENOUS at 21:23

## 2025-04-11 RX ADMIN — PROPOFOL 10 MCG/KG/MIN: 10 INJECTION, EMULSION INTRAVENOUS at 08:59

## 2025-04-11 RX ADMIN — DEXAMETHASONE SODIUM PHOSPHATE 4 MG: 4 INJECTION INTRA-ARTICULAR; INTRALESIONAL; INTRAMUSCULAR; INTRAVENOUS; SOFT TISSUE at 21:20

## 2025-04-11 RX ADMIN — DEXAMETHASONE SODIUM PHOSPHATE 4 MG: 4 INJECTION INTRA-ARTICULAR; INTRALESIONAL; INTRAMUSCULAR; INTRAVENOUS; SOFT TISSUE at 11:06

## 2025-04-11 RX ADMIN — HYDRALAZINE HYDROCHLORIDE 10 MG: 20 INJECTION INTRAMUSCULAR; INTRAVENOUS at 19:34

## 2025-04-11 RX ADMIN — DEXAMETHASONE SODIUM PHOSPHATE 4 MG: 4 INJECTION INTRA-ARTICULAR; INTRALESIONAL; INTRAMUSCULAR; INTRAVENOUS; SOFT TISSUE at 16:31

## 2025-04-11 RX ADMIN — ACETAMINOPHEN 650 MG: 325 TABLET ORAL at 16:28

## 2025-04-11 RX ADMIN — Medication 10 MG: at 12:09

## 2025-04-11 RX ADMIN — DEXAMETHASONE SODIUM PHOSPHATE 4 MG: 4 INJECTION INTRA-ARTICULAR; INTRALESIONAL; INTRAMUSCULAR; INTRAVENOUS; SOFT TISSUE at 05:22

## 2025-04-11 RX ADMIN — OXYCODONE 10 MG: 5 TABLET ORAL at 05:22

## 2025-04-11 RX ADMIN — Medication 10 MG: at 10:32

## 2025-04-11 RX ADMIN — OXYCODONE 10 MG: 5 TABLET ORAL at 00:34

## 2025-04-11 RX ADMIN — Medication 10 MG: at 21:20

## 2025-04-11 RX ADMIN — SODIUM CHLORIDE 2.5 MG/HR: 0.9 INJECTION, SOLUTION INTRAVENOUS at 12:31

## 2025-04-11 RX ADMIN — ACETAMINOPHEN 650 MG: 325 TABLET ORAL at 00:34

## 2025-04-11 RX ADMIN — HYDRALAZINE HYDROCHLORIDE 10 MG: 20 INJECTION INTRAMUSCULAR; INTRAVENOUS at 11:06

## 2025-04-11 RX ADMIN — SODIUM CHLORIDE, PRESERVATIVE FREE 10 ML: 5 INJECTION INTRAVENOUS at 08:33

## 2025-04-11 RX ADMIN — OXYCODONE 10 MG: 5 TABLET ORAL at 11:27

## 2025-04-11 RX ADMIN — LEVETIRACETAM 500 MG: 100 INJECTION INTRAVENOUS at 11:06

## 2025-04-11 RX ADMIN — Medication 10 MG: at 08:40

## 2025-04-11 RX ADMIN — Medication 10 MG: at 05:31

## 2025-04-11 ASSESSMENT — PULMONARY FUNCTION TESTS
PIF_VALUE: 22
PIF_VALUE: 13
PIF_VALUE: 22
PIF_VALUE: 13
PIF_VALUE: 20
PIF_VALUE: 14
PIF_VALUE: 13
PIF_VALUE: 13
PIF_VALUE: 21
PIF_VALUE: 21
PIF_VALUE: 13

## 2025-04-11 NOTE — PLAN OF CARE
Problem: Safety - Medical Restraint  Goal: Remains free of injury from restraints (Restraint for Interference with Medical Device)  Description: INTERVENTIONS:  1. Determine that other, less restrictive measures have been tried or would not be effective before applying the restraint  2. Evaluate the patient's condition at the time of restraint application  3. Inform patient/family regarding the reason for restraint  4. Q2H: Monitor safety, psychosocial status, comfort, nutrition and hydration  Outcome: Progressing  Flowsheets  Taken 4/11/2025 0000  Remains free of injury from restraints (restraint for interference with medical device): Every 2 hours: Monitor safety, psychosocial status, comfort, nutrition and hydration  Taken 4/10/2025 2200  Remains free of injury from restraints (restraint for interference with medical device): Every 2 hours: Monitor safety, psychosocial status, comfort, nutrition and hydration  Taken 4/10/2025 2000  Remains free of injury from restraints (restraint for interference with medical device): Every 2 hours: Monitor safety, psychosocial status, comfort, nutrition and hydration     Problem: Discharge Planning  Goal: Discharge to home or other facility with appropriate resources  Outcome: Progressing     Problem: Respiratory - Adult  Goal: Achieves optimal ventilation and oxygenation  4/11/2025 0308 by Polly Patel RN  Outcome: Progressing  4/10/2025 1953 by Cecile Lopez RCP  Outcome: Progressing     Problem: Neurosensory - Adult  Goal: Achieves stable or improved neurological status  Outcome: Progressing  Goal: Absence of seizures  Outcome: Progressing  Goal: Remains free of injury related to seizures activity  Outcome: Progressing  Goal: Achieves maximal functionality and self care  Outcome: Progressing     Problem: Cardiovascular - Adult  Goal: Maintains optimal cardiac output and hemodynamic stability  Outcome: Progressing  Goal: Absence of cardiac dysrhythmias or at  reorientation, refocusing and direction  4. Decrease environmental stimuli, including noise as appropriate  5. Monitor and intervene to maintain adequate nutrition, hydration, elimination, sleep and activity  6. If unable to ensure safety without constant attention obtain sitter and review sitter guidelines with assigned personnel  7. Initiate Psychosocial CNS and Spiritual Care consult, as indicated  Outcome: Progressing     Problem: Pain  Goal: Verbalizes/displays adequate comfort level or baseline comfort level  Outcome: Progressing  Flowsheets (Taken 4/11/2025 0034)  Verbalizes/displays adequate comfort level or baseline comfort level:   Assess pain using appropriate pain scale   Administer analgesics based on type and severity of pain and evaluate response

## 2025-04-11 NOTE — PROGRESS NOTES
Occupational Therapy    [facility]  Occupational Therapy Not Seen Note    DATE: 2025    NAME: Jack Gómez  MRN: [id number]   : 1985      Patient not seen this date for Occupational Therapy due to:    Patient is not appropriate for active participation in EOB/OOB OT evaluation/treatment at this time d/t intubated and on some sedation. Will check back as time allows or .       Electronically signed by Leydi Brasher OT on 2025 at 9:30 AM

## 2025-04-11 NOTE — PROGRESS NOTES
Neurosurgery [facility]/Resident    Daily Progress Note   Chief Complaint   Patient presents with    Cerebrovascular Accident     4/11/2025  2:58 PM    Chart reviewed. Returned from OR yesterday. Remains intubated and lightly sedated on propofol.   Post op CT head done post op shows post op changes. MRI brain done this AM and reviewed with Dr Palmer.     Vitals:    04/11/25 1300 04/11/25 1310 04/11/25 1315 04/11/25 1333   BP:       Pulse: (!) 108 93 89 (!) 102   Resp: 18 14 13 17   Temp:       TempSrc:       SpO2: 100% 100% 100% 98%   Weight:       Height:             PE:   Intubated, propofol   Opens eyes  Does not follow commands, but does wave at family  Motor   Moving all extremities equally     Drain output   RODOLFO drain 150 ml/12h  Incision cranial dressing, c/d/i      Lab Results   Component Value Date    WBC 12.8 (H) 04/11/2025    HGB 10.5 (L) 04/11/2025    HCT 32.9 (L) 04/11/2025     04/11/2025    CHOL 155 04/11/2025    TRIG 152 (H) 04/11/2025    HDL 34 (L) 04/11/2025    ALT 16 07/31/2024    AST 19 07/31/2024     (H) 04/11/2025    K 3.7 04/11/2025     (H) 04/11/2025    CREATININE 0.7 04/11/2025    BUN 11 04/11/2025    CO2 19 (L) 04/11/2025    TSH 0.59 04/10/2025    INR 1.0 04/10/2025    LABA1C 4.8 06/22/2021    CRP 4.5 05/07/2021    SEDRATE 12 05/07/2021       Radiology   MRI OF THE BRAIN WITHOUT AND WITH CONTRAST  4/11/2025 9:31 am     TECHNIQUE:  Multiplanar multisequence MRI of the head/brain was performed without and  with the administration of intravenous contrast.     COMPARISON:  CT head performed 04/10/2025.     HISTORY:  ORDERING SYSTEM PROVIDED HISTORY: s/p craniotomy for tumor resection  TECHNOLOGIST PROVIDED HISTORY:  s/p craniotomy for tumor resection  Reason for Exam: s/p craniotomy for tumor resection     FINDINGS:  INTRACRANIAL STRUCTURES/VENTRICLES:  The sellar and suprasellar structures,  optic chiasm, corpus callosum, pineal gland, tectum, and midline brainstem  structures are

## 2025-04-11 NOTE — PROGRESS NOTES
chloride flush  5-40 mL IntraVENous 2 times per day    0.9% NaCl with KCl 20 mEq   IntraVENous Once    dexAMETHasone  4 mg IntraVENous Q6H    pantoprazole (PROTONIX) 40 mg in sodium chloride (PF) 0.9 % 10 mL injection  40 mg IntraVENous Daily    sodium chloride flush  5-40 mL IntraVENous 2 times per day     CONTINUOUS INFUSIONS:   sodium chloride      propofol 75 mcg/kg/min (04/10/25 1638)    sodium chloride 100 mL/hr at 25 0604    sodium chloride 10 mL/hr at 25 0604     PRN MEDICATIONS:   hydrALAZINE, labetalol, sodium chloride flush, sodium chloride, acetaminophen **OR** acetaminophen, ondansetron **OR** ondansetron, sodium chloride flush, sodium chloride flush, sodium chloride, ondansetron **OR** ondansetron, oxyCODONE **OR** oxyCODONE, morphine **OR** morphine, cyclobenzaprine    VITALS:  Temperature Range: Temp: 99.3 °F (37.4 °C) Temp  Av.7 °F (35.9 °C)  Min: 94.8 °F (34.9 °C)  Max: 100.4 °F (38 °C)  BP Range: Systolic (24hrs), Av , Min:102 , Max:164     Diastolic (24hrs), Av, Min:49, Max:119    Pulse Range: Pulse  Av.3  Min: 46  Max: 126  Respiration Range: Resp  Av.5  Min: 13  Max: 40  Current Pulse Ox: SpO2: 99 %  24HR Pulse Ox Range: SpO2  Av.2 %  Min: 86 %  Max: 100 %  Patient Vitals for the past 12 hrs:   BP Temp Temp src Pulse Resp SpO2   25 0600 -- 99.3 °F (37.4 °C) Oral 66 18 99 %   25 0545 -- -- -- 76 21 100 %   25 0530 -- -- -- 83 18 100 %   25 0515 -- -- -- 90 20 100 %   25 0500 -- -- -- 69 18 99 %   25 0445 -- -- -- 70 18 99 %   25 0430 -- -- -- 71 18 100 %   255 -- -- -- 70 18 99 %   250 127/71 99.1 °F (37.3 °C) Oral 70 18 99 %   25 0345 -- -- -- 75 18 99 %   25 0330 -- -- -- (!) 105 23 100 %   258 -- -- -- (!) 104 19 99 %   25 -- -- -- 77 18 98 %   25 0300 -- -- -- 86 18 100 %   25 0245 -- -- -- 78 18 98 %   25 0230 -- -- -- 78 18 98 %  Confirmation of course of NG/OG/NE tube and location of tip of tube Portable?->Yes Reason for Exam: supine FINDINGS: Chest: The endotracheal tube terminates 6.7 cm above the je.  The enteric tube courses off the field of view in the upper abdomen.The cardiac and mediastinal contours appear unchanged. Shallow inflation.  Minimal linear opacities in the lung bases again noted.  No new airspace disease identified in the interval. No evidence for pneumothorax. Abdomen: The enteric tube tip and side hole project at the level of the stomach.  Paucity of bowel gas.     CHEST: 1. Endotracheal tube terminates 6.7 cm above the je. 2. Shallow inflation with basilar subsegmental atelectasis. ABDOMEN: Enteric tube tip and side hole project at the level of the stomach.     XR ABDOMEN FOR NG/OG/NE TUBE PLACEMENT  Result Date: 4/10/2025  EXAMINATION: ONE XRAY VIEW OF THE CHEST; ONE SUPINE XRAY VIEW(S) OF THE ABDOMEN 4/10/2025 9:28 am COMPARISON: Chest radiograph and abdominal radiograph today. HISTORY: ORDERING SYSTEM PROVIDED HISTORY: verify ETT TECHNOLOGIST PROVIDED HISTORY: verify ETT Reason for Exam: supine; ORDERING SYSTEM PROVIDED HISTORY: Confirmation of course of NG/OG/NE tube and location of tip of tube TECHNOLOGIST PROVIDED HISTORY: Confirmation of course of NG/OG/NE tube and location of tip of tube Portable?->Yes Reason for Exam: supine FINDINGS: Chest: The endotracheal tube terminates 6.7 cm above the je.  The enteric tube courses off the field of view in the upper abdomen.The cardiac and mediastinal contours appear unchanged. Shallow inflation.  Minimal linear opacities in the lung bases again noted.  No new airspace disease identified in the interval. No evidence for pneumothorax. Abdomen: The enteric tube tip and side hole project at the level of the stomach.  Paucity of bowel gas.     CHEST: 1. Endotracheal tube terminates 6.7 cm above the je. 2. Shallow inflation with basilar subsegmental

## 2025-04-11 NOTE — CARE COORDINATION
Case Management Assessment  Initial Evaluation    Date/Time of Evaluation: 4/11/2025 5:34 PM  Assessment Completed by: Maxine Leach RN    If patient is discharged prior to next notation, then this note serves as note for discharge by case management.    Patient Name: Jack Gómez                   YOB: 1985  Diagnosis: Intracranial hemorrhage (HCC) [I62.9]  Intracerebral hemorrhage, nontraumatic (HCC) [I61.9]  Altered mental status, unspecified altered mental status type [R41.82]                   Date / Time: 4/10/2025  7:28 AM    Patient Admission Status: Inpatient   Readmission Risk (Low < 19, Mod (19-27), High > 27): Readmission Risk Score: 11.1    Current PCP: Debbie Villareal MD  PCP verified by CM? (P) Yes    Chart Reviewed: Yes      History Provided by: (P) Child/Family  Patient Orientation: (P) Sedated    Patient Cognition:      Hospitalization in the last 30 days (Readmission):  No    If yes, Readmission Assessment in  Navigator will be completed.    Advance Directives:      Code Status: Full Code   Patient's Primary Decision Maker is: (P) Legal Next of Kin      Discharge Planning:    Patient lives with: (P) Family Members Type of Home: (P) House  Primary Care Giver: (P) Self  Patient Support Systems include: (P) Family Members, Parent, Friends/Neighbors   Current Financial resources:    Current community resources:    Current services prior to admission: (P) None            Current DME:              Type of Home Care services:  (P) None    ADLS  Prior functional level: (P) Independent in ADLs/IADLs  Current functional level:      PT AM-PAC:   /24  OT AM-PAC:   /24    Family can provide assistance at DC:    Would you like Case Management to discuss the discharge plan with any other family members/significant others, and if so, who?    Plans to Return to Present Housing: (P) Unknown at present  Other Identified Issues/Barriers to RETURNING to current housing: safety  Potential

## 2025-04-11 NOTE — ANESTHESIA POSTPROCEDURE EVALUATION
Department of Anesthesiology  Postprocedure Note    Patient: Jack Gómez  MRN: [id number]  YOB: 1985  Date of evaluation: 4/10/2025    Procedure Summary       Date: 04/10/25 Room / Location: [address]    Anesthesia Start: 1301 Anesthesia Stop: 1730    Procedure: LEFT CRANIECTOMY FOR HEMATOMA (Left: Head) Diagnosis:       Intracranial hemorrhage (HCC)      (Intracranial hemorrhage (HCC) [I62.9])    Surgeons: Amanda Palmer DO Responsible Provider: Mac Khan MD    Anesthesia Type: general ASA Status: 4 - Emergent            Anesthesia Type: No value filed.    Tu Phase I:      Tu Phase II:      Anesthesia Post Evaluation    Patient location during evaluation: ICU  Patient participation: complete - patient cannot participate  Level of consciousness: sedated and ventilated  Airway patency: patent  Nausea & Vomiting: no nausea and no vomiting  Cardiovascular status: blood pressure returned to baseline  Respiratory status: ventilator  Hydration status: euvolemic  Comments: BP (!) 143/49   Pulse (!) 123   Temp 99.2 °F (37.3 °C) (Oral)   Resp 17   Ht 1.702 m (5' 7\")   Wt 72.6 kg (160 lb)   LMP 07/01/2016   SpO2 100%   BMI 25.06 kg/m²   Pain management: adequate    No notable events documented.

## 2025-04-11 NOTE — PROGRESS NOTES
[facility]  PROGRESS NOTE    Shift date: 4.11.2025  Shift day: Friday   Shift # 1    Room # 0130/0130-01        Name: Jack Gómez MRN: [id number]    Age: 39 y.o.     Sex: female   Language: English   Zoroastrianism: [employer]   Intracerebral hemorrhage, nontraumatic (HCC)     Referral: Spiritual Service Consult    Date: 4/11/2025            Total Time Calculated: 10 min              Spiritual Assessment began in [facility] ICU        Referral/Consult From: Nurse   Encounter Overview/Reason: Spiritual/Emotional Needs  Service Provided For: Family    Admit Date & Time: 4/10/2025  7:28 AM    Emergency Contacts Listed:  Extended Emergency Contact Information  Primary Emergency Contact: YOKASTA ISLAS  Mobile Phone: [phone]  Relation: Parent      Assessment:  Jack Gómez is a 39 y.o. female in the hospital because Intracerebral hemorrhage, nontraumatic (HCC).     Upon entering the room writer observes Patient and Family appearing Calm and Coping at this time.  Patient remains intubated but awake.  Family bedside states that they are doing ok right now.       informed family that support is available if needed.      Perceived Need:  Receive care and concern and Recieve hospitality    Intervention:  Writer introduced self and title as  and provided Actively listened and Provided space for feelings, thoughts, and concerns.    Outcome:  Demonstrated caring concern     Plan:  Chaplains will follow up as needed       Electronically signed by FELICITY Collins on 4/11/2025 at 11:02 AM  [facility]  [phone]     04/11/25 1050   Encounter Summary   Encounter Overview/Reason Spiritual/Emotional Needs   Service Provided For Family   Referral/Consult From Nurse   Support System Family members   Last Encounter  04/11/25   Complexity of Encounter Low   Begin Time 1040   End Time  1050   Total Time Calculated

## 2025-04-11 NOTE — PROGRESS NOTES
attempted to visit with patient and family, per Spiritual Care Consult for \"Spiritual Distress & Emotional Distress.\" Patient was intubated at time of visit. Patient's mother was present in room, however, she was on a phone call when  attempted visit.  briefly shared words of comfort and made patient's mother aware of chaplains' support. Chaplains will make follow-up visit, per Spiritual Care Consult referral list. Chaplains can be reached 24/7 via [employer].    Chaplain Pope

## 2025-04-11 NOTE — PLAN OF CARE
Problem: Respiratory - Adult  Goal: Achieves optimal ventilation and oxygenation  4/11/2025 0828 by Mona Lutz RCP  Outcome: Progressing  4/11/2025 0308 by Polly Patel RN  Outcome: Progressing  4/10/2025 1953 by Cecile Lopez RCP  Outcome: Progressing

## 2025-04-11 NOTE — PLAN OF CARE
Problem: Safety - Medical Restraint  Goal: Remains free of injury from restraints (Restraint for Interference with Medical Device)  Description: INTERVENTIONS:  1. Determine that other, less restrictive measures have been tried or would not be effective before applying the restraint  2. Evaluate the patient's condition at the time of restraint application  3. Inform patient/family regarding the reason for restraint  4. Q2H: Monitor safety, psychosocial status, comfort, nutrition and hydration  4/11/2025 1538 by Iesha Cortez RN  Outcome: Completed  Flowsheets  Taken 4/11/2025 1200 by Iesha Cortez RN  Remains free of injury from restraints (restraint for interference with medical device):   Determine that other, less restrictive measures have been tried or would not be effective before applying the restraint   Evaluate the patient's condition at the time of restraint application   Inform patient/family regarding the reason for restraint   Every 2 hours: Monitor safety, psychosocial status, comfort, nutrition and hydration  Taken 4/11/2025 1000 by Iesha Cortez RN  Remains free of injury from restraints (restraint for interference with medical device):   Determine that other, less restrictive measures have been tried or would not be effective before applying the restraint   Evaluate the patient's condition at the time of restraint application   Inform patient/family regarding the reason for restraint   Every 2 hours: Monitor safety, psychosocial status, comfort, nutrition and hydration  Taken 4/11/2025 0800 by Iesha Cortez RN  Remains free of injury from restraints (restraint for interference with medical device):   Determine that other, less restrictive measures have been tried or would not be effective before applying the restraint   Evaluate the patient's condition at the time of restraint application   Inform patient/family regarding the reason for restraint   Every 2 hours: Monitor safety, psychosocial

## 2025-04-11 NOTE — PROGRESS NOTES
Physical Therapy    DATE: 2025    NAME: Jack Gómez  MRN: [id number]   : 1985    Patient not seen this date for Physical Therapy due to:      [] Cancel by RN or physician due to:    [] Hemodialysis    [] Critical Lab Value Level     [] Blood transfusion in progress    [] Acute or unstable cardiovascular status   _MAP < 55 or more than >115  _HR < 40 or > 130    [] Acute or unstable pulmonary status   -FiO2 > 60%   _RR < 5 or >40    _O2 sats < 85%    [] Strict Bedrest    [] Off Unit for surgery or procedure    [] Off Unit for testing       [] Pending imaging to R/O fracture    [] Refusal by Patient      [x] Other AM Pt intubated, possible extubation, check back on Sat. Discussed with RN       [] PT being discontinued at this time. Patient independent. No further needs.     [] PT being discontinued at this time as the patient has been transferred to hospice care. No further needs.      Mary Raphael, PT

## 2025-04-12 LAB
ANION GAP SERPL CALCULATED.3IONS-SCNC: 13 MMOL/L (ref 9–16)
BUN SERPL-MCNC: 16 MG/DL (ref 6–20)
CALCIUM SERPL-MCNC: 9.5 MG/DL (ref 8.6–10.4)
CHLORIDE SERPL-SCNC: 111 MMOL/L (ref 98–107)
CO2 SERPL-SCNC: 19 MMOL/L (ref 20–31)
CREAT SERPL-MCNC: 0.6 MG/DL (ref 0.6–0.9)
EKG ATRIAL RATE: 50 BPM
EKG P AXIS: 50 DEGREES
EKG P-R INTERVAL: 158 MS
EKG Q-T INTERVAL: 482 MS
EKG QRS DURATION: 84 MS
EKG QTC CALCULATION (BAZETT): 439 MS
EKG R AXIS: 31 DEGREES
EKG T AXIS: 10 DEGREES
EKG VENTRICULAR RATE: 50 BPM
ERYTHROCYTE [DISTWIDTH] IN BLOOD BY AUTOMATED COUNT: 13.2 % (ref 11.8–14.4)
GFR, ESTIMATED: >90 ML/MIN/1.73M2
GLUCOSE SERPL-MCNC: 138 MG/DL (ref 74–99)
HCT VFR BLD AUTO: 30.9 % (ref 36.3–47.1)
HGB BLD-MCNC: 9.8 G/DL (ref 11.9–15.1)
MCH RBC QN AUTO: 27.4 PG (ref 25.2–33.5)
MCHC RBC AUTO-ENTMCNC: 31.7 G/DL (ref 28.4–34.8)
MCV RBC AUTO: 86.3 FL (ref 82.6–102.9)
NRBC BLD-RTO: 0 PER 100 WBC
PLATELET # BLD AUTO: 273 K/UL (ref 138–453)
PMV BLD AUTO: 10.5 FL (ref 8.1–13.5)
POTASSIUM SERPL-SCNC: 3.8 MMOL/L (ref 3.7–5.3)
RBC # BLD AUTO: 3.58 M/UL (ref 3.95–5.11)
SODIUM SERPL-SCNC: 143 MMOL/L (ref 136–145)
WBC OTHER # BLD: 21.3 K/UL (ref 3.5–11.3)

## 2025-04-12 PROCEDURE — 99291 CRITICAL CARE FIRST HOUR: CPT | Performed by: STUDENT IN AN ORGANIZED HEALTH CARE EDUCATION/TRAINING PROGRAM

## 2025-04-12 PROCEDURE — 2500000003 HC RX 250 WO HCPCS: Performed by: NURSE PRACTITIONER

## 2025-04-12 PROCEDURE — 2000000000 HC ICU R&B

## 2025-04-12 PROCEDURE — 80048 BASIC METABOLIC PNL TOTAL CA: CPT

## 2025-04-12 PROCEDURE — 2580000003 HC RX 258: Performed by: NURSE PRACTITIONER

## 2025-04-12 PROCEDURE — 6370000000 HC RX 637 (ALT 250 FOR IP): Performed by: NURSE PRACTITIONER

## 2025-04-12 PROCEDURE — 85027 COMPLETE CBC AUTOMATED: CPT

## 2025-04-12 PROCEDURE — 6360000002 HC RX W HCPCS: Performed by: NURSE PRACTITIONER

## 2025-04-12 PROCEDURE — 36620 INSERTION CATHETER ARTERY: CPT

## 2025-04-12 PROCEDURE — 93010 ELECTROCARDIOGRAM REPORT: CPT | Performed by: INTERNAL MEDICINE

## 2025-04-12 RX ORDER — CALCIUM CARBONATE 500 MG/1
500 TABLET, CHEWABLE ORAL 3 TIMES DAILY PRN
Status: DISCONTINUED | OUTPATIENT
Start: 2025-04-12 | End: 2025-04-18 | Stop reason: HOSPADM

## 2025-04-12 RX ORDER — PANTOPRAZOLE SODIUM 40 MG/1
40 TABLET, DELAYED RELEASE ORAL
Status: DISCONTINUED | OUTPATIENT
Start: 2025-04-13 | End: 2025-04-18 | Stop reason: HOSPADM

## 2025-04-12 RX ORDER — AMLODIPINE BESYLATE 5 MG/1
5 TABLET ORAL DAILY
Status: DISCONTINUED | OUTPATIENT
Start: 2025-04-12 | End: 2025-04-18 | Stop reason: HOSPADM

## 2025-04-12 RX ORDER — SENNA AND DOCUSATE SODIUM 50; 8.6 MG/1; MG/1
2 TABLET, FILM COATED ORAL DAILY PRN
Status: DISCONTINUED | OUTPATIENT
Start: 2025-04-12 | End: 2025-04-16

## 2025-04-12 RX ADMIN — LEVETIRACETAM 500 MG: 100 INJECTION INTRAVENOUS at 10:25

## 2025-04-12 RX ADMIN — HYDRALAZINE HYDROCHLORIDE 10 MG: 20 INJECTION INTRAMUSCULAR; INTRAVENOUS at 10:21

## 2025-04-12 RX ADMIN — Medication 10 MG: at 01:23

## 2025-04-12 RX ADMIN — SODIUM CHLORIDE, PRESERVATIVE FREE 40 MG: 5 INJECTION INTRAVENOUS at 08:30

## 2025-04-12 RX ADMIN — OXYCODONE 10 MG: 5 TABLET ORAL at 04:57

## 2025-04-12 RX ADMIN — OXYCODONE 10 MG: 5 TABLET ORAL at 17:57

## 2025-04-12 RX ADMIN — HYDRALAZINE HYDROCHLORIDE 10 MG: 20 INJECTION INTRAMUSCULAR; INTRAVENOUS at 17:57

## 2025-04-12 RX ADMIN — HYDRALAZINE HYDROCHLORIDE 10 MG: 20 INJECTION INTRAMUSCULAR; INTRAVENOUS at 01:01

## 2025-04-12 RX ADMIN — DEXAMETHASONE SODIUM PHOSPHATE 4 MG: 4 INJECTION INTRA-ARTICULAR; INTRALESIONAL; INTRAMUSCULAR; INTRAVENOUS; SOFT TISSUE at 04:37

## 2025-04-12 RX ADMIN — SODIUM CHLORIDE, PRESERVATIVE FREE 10 ML: 5 INJECTION INTRAVENOUS at 19:26

## 2025-04-12 RX ADMIN — ACETAMINOPHEN 650 MG: 325 TABLET ORAL at 04:58

## 2025-04-12 RX ADMIN — LEVETIRACETAM 500 MG: 100 INJECTION INTRAVENOUS at 22:21

## 2025-04-12 RX ADMIN — DEXAMETHASONE SODIUM PHOSPHATE 4 MG: 4 INJECTION INTRA-ARTICULAR; INTRALESIONAL; INTRAMUSCULAR; INTRAVENOUS; SOFT TISSUE at 10:27

## 2025-04-12 RX ADMIN — Medication 10 MG: at 04:37

## 2025-04-12 RX ADMIN — OXYCODONE 10 MG: 5 TABLET ORAL at 12:04

## 2025-04-12 RX ADMIN — DEXAMETHASONE SODIUM PHOSPHATE 4 MG: 4 INJECTION INTRA-ARTICULAR; INTRALESIONAL; INTRAMUSCULAR; INTRAVENOUS; SOFT TISSUE at 17:57

## 2025-04-12 RX ADMIN — DEXAMETHASONE SODIUM PHOSPHATE 4 MG: 4 INJECTION INTRA-ARTICULAR; INTRALESIONAL; INTRAMUSCULAR; INTRAVENOUS; SOFT TISSUE at 21:53

## 2025-04-12 RX ADMIN — Medication 10 MG: at 11:12

## 2025-04-12 RX ADMIN — ONDANSETRON 4 MG: 2 INJECTION, SOLUTION INTRAMUSCULAR; INTRAVENOUS at 19:22

## 2025-04-12 RX ADMIN — AMLODIPINE BESYLATE 5 MG: 5 TABLET ORAL at 12:04

## 2025-04-12 RX ADMIN — ACETAMINOPHEN 650 MG: 325 TABLET ORAL at 15:17

## 2025-04-12 RX ADMIN — OXYCODONE 10 MG: 5 TABLET ORAL at 21:58

## 2025-04-12 NOTE — PROGRESS NOTES
Daily Progress Note  Neuro Critical Care    Patient Name: Jack Gómez  Patient : 1985  Room/Bed: 0130/0130-01  Code Status: Full  Allergies:   Allergies   Allergen Reactions    Latex Itching    Reglan [Metoclopramide] Anxiety       CHIEF COMPLAINT:      Dysarthria, aphasia     INTERVAL HISTORY    Initial Presentation (Admitted 4/10/25):  The patient is a 38 yo female with past medical history of HTN and previous hysterectomy  (secondary to endometriosis) previously on estradiol who presented to ED as a critical stroke alert.  Patient is a  and was trying to get ready for work when she had sudden onset slurring of speech, aphasia and she went flaccid and fell down.  She was brought to ED via EMS.  On arrival to ED, she had GCS of 9, E2, V3 M4 and was not following any commands.  She was immediately taken to the scanner and was found to have left posterior temporal lobe hemorrhage with left-to-right midline shift of 8 mm with partial effacement of suprasellar cistern.  She does not take any blood thinners at home.  Post scanner, her exam improved and she started having spontaneous movement of the left side and right lower extremity.  SBP at the time of presentation was 170.  She was started on Cardene drip and mannitol 1 g/kg bolus was administered.  Neurosurgery and Neuro Critical care were consulted.  On exam in the ED, patient opens eyes to voice.  She is aphasic; not able to answer questions appropriately or follow commands.  Moving the left upper and lower extremity purposefully.  Able to antigravity in the right upper extremity.  Not able to antigravity in the right lower extremity.  MRI Brain with/without contrast and MRV Head ordered STAT due to concerns for possible brain mass versus venous infarct.  Patient was intubated for airway protection prior to MRI.  MRI Brain with/without contrast showed large left frontotemporal IPH with associated edema and midline shift (1.1cm),

## 2025-04-12 NOTE — PROGRESS NOTES
Progress Note - Neurosurgery    Chief Complaint   Patient presents with    Cerebrovascular Accident       Subjective:  Jack Gómez is a 39 y.o. female.  Extubated yesterday  Review of Systems    Objective:  Blood pressure (!) 127/54, pulse 92, temperature 98 °F (36.7 °C), resp. rate 16, height 1.702 m (5' 7\"), weight 72.6 kg (160 lb), last menstrual period 07/01/2016, SpO2 97%.  Physical Exam eyes open. Mari. Regards. Some R hemineglect & global aphasia  Cdi wound  Neurological Exam    Labs:  Orders Only on 09/11/2024   Component Date Value Ref Range Status    Glucose 09/11/2024 98  (74    - 106) MG/DL Final    BUN 09/11/2024 13  (7     - 17) MG/DL Final    Creatinine 09/11/2024 0.66  (0.52  - 1.04) MG/DL Final    Sodium 09/11/2024 139  (137   - 145) MMOL/L Final    Potassium 09/11/2024 4.1  (3.5   - 5.1) MMOL/L Final    Chloride 09/11/2024 104  (98    - 107) MMOL/L Final    CO2 09/11/2024 26  (22    - 30) MMOL/L Final    Calcium 09/11/2024 9.6  (8.6   - 10.6) MG/DL Final    GFR Non- 09/11/2024 99.2  (60.0  - 115.8) ML/M1.7 Final    GFR  09/11/2024 120.0  (60.0  - 140.1) ML/M1.7 Final    Magnesium 09/11/2024 2.0  (1.6   - 2.3) MG/DL Final     Results for orders placed during the hospital encounter of 04/10/25    MRI BRAIN W WO CONTRAST    Narrative  EXAMINATION:  MRI OF THE BRAIN WITHOUT AND WITH CONTRAST  4/11/2025 9:31 am    TECHNIQUE:  Multiplanar multisequence MRI of the head/brain was performed without and  with the administration of intravenous contrast.    COMPARISON:  CT head performed 04/10/2025.    HISTORY:  ORDERING SYSTEM PROVIDED HISTORY: s/p craniotomy for tumor resection  TECHNOLOGIST PROVIDED HISTORY:  s/p craniotomy for tumor resection  Reason for Exam: s/p craniotomy for tumor resection    FINDINGS:  INTRACRANIAL STRUCTURES/VENTRICLES:  The sellar and suprasellar structures,  optic chiasm, corpus callosum, pineal gland, tectum, and midline

## 2025-04-12 NOTE — PLAN OF CARE
Drain Removal Note    []Subdural Drain   [x]Subgaleal Drain  []Ventriculostomy Drain    Drain removed from suction, prepped with betadine. Drain suture cut and drain removed. Two staples placed over drain hole with hemostasis.     No complications, patient tolerated procedure well.    --  Mikael Hernández CNP  1:53 PM EDT

## 2025-04-12 NOTE — PLAN OF CARE
Problem: Discharge Planning  Goal: Discharge to home or other facility with appropriate resources  Outcome: Progressing     Problem: Respiratory - Adult  Goal: Achieves optimal ventilation and oxygenation  Outcome: Progressing     Problem: Safety - Adult  Goal: Free from fall injury  Outcome: Progressing     Problem: Neurosensory - Adult  Goal: Achieves stable or improved neurological status  Outcome: Progressing  Goal: Absence of seizures  Outcome: Progressing  Goal: Remains free of injury related to seizures activity  Outcome: Progressing  Goal: Achieves maximal functionality and self care  Outcome: Progressing     Problem: Cardiovascular - Adult  Goal: Maintains optimal cardiac output and hemodynamic stability  Outcome: Progressing  Goal: Absence of cardiac dysrhythmias or at baseline  Outcome: Progressing     Problem: Skin/Tissue Integrity - Adult  Goal: Skin integrity remains intact  Description: 1.  Monitor for areas of redness and/or skin breakdown  2.  Assess vascular access sites hourly  3.  Every 4-6 hours minimum:  Change oxygen saturation probe site  4.  Every 4-6 hours:  If on nasal continuous positive airway pressure, respiratory therapy assess nares and determine need for appliance change or resting period  Outcome: Progressing  Goal: Incisions, wounds, or drain sites healing without S/S of infection  Outcome: Progressing  Goal: Oral mucous membranes remain intact  Outcome: Progressing     Problem: Musculoskeletal - Adult  Goal: Return mobility to safest level of function  Outcome: Progressing  Goal: Maintain proper alignment of affected body part  Outcome: Progressing  Goal: Return ADL status to a safe level of function  Outcome: Progressing     Problem: Gastrointestinal - Adult  Goal: Minimal or absence of nausea and vomiting  Outcome: Progressing  Goal: Maintains or returns to baseline bowel function  Outcome: Progressing  Goal: Maintains adequate nutritional intake  Outcome: Progressing    elimination, sleep and activity  6. If unable to ensure safety without constant attention obtain sitter and review sitter guidelines with assigned personnel  7. Initiate Psychosocial CNS and Spiritual Care consult, as indicated  Outcome: Progressing     Problem: Pain  Goal: Verbalizes/displays adequate comfort level or baseline comfort level  Outcome: Progressing

## 2025-04-12 NOTE — PLAN OF CARE
Problem: Discharge Planning  Goal: Discharge to home or other facility with appropriate resources  4/12/2025 1843 by True Crawford RN  Outcome: Progressing  4/12/2025 0608 by Latrice Dunn RN  Outcome: Progressing     Problem: Respiratory - Adult  Goal: Achieves optimal ventilation and oxygenation  4/12/2025 1843 by True Crawford RN  Outcome: Progressing  4/12/2025 0608 by Latrice Dunn RN  Outcome: Progressing     Problem: Safety - Adult  Goal: Free from fall injury  4/12/2025 1843 by True Crawford RN  Outcome: Progressing  4/12/2025 0608 by Latrice Dunn RN  Outcome: Progressing     Problem: Neurosensory - Adult  Goal: Achieves stable or improved neurological status  4/12/2025 1843 by True Crawford RN  Outcome: Progressing  4/12/2025 0608 by Latrice Dunn RN  Outcome: Progressing  Goal: Absence of seizures  4/12/2025 1843 by True Crawford RN  Outcome: Progressing  4/12/2025 0608 by Latrice Dunn RN  Outcome: Progressing  Goal: Remains free of injury related to seizures activity  4/12/2025 1843 by True Crawford RN  Outcome: Progressing  4/12/2025 0608 by Latrice Dunn RN  Outcome: Progressing  Goal: Achieves maximal functionality and self care  4/12/2025 1843 by True Crawford RN  Outcome: Progressing  4/12/2025 0608 by Latrice Dunn RN  Outcome: Progressing     Problem: Cardiovascular - Adult  Goal: Maintains optimal cardiac output and hemodynamic stability  4/12/2025 1843 by True Crawford RN  Outcome: Progressing  4/12/2025 0608 by Latrice Dunn RN  Outcome: Progressing  Goal: Absence of cardiac dysrhythmias or at baseline  4/12/2025 1843 by True Crawford RN  Outcome: Progressing  4/12/2025 0608 by Latrice Dunn RN  Outcome: Progressing     Problem: Skin/Tissue Integrity - Adult  Goal: Skin integrity remains intact  Description: 1.  Monitor for areas of redness and/or skin breakdown  2.  Assess vascular access sites hourly  3.  Every 4-6 hours minimum:  Change oxygen  RN  Outcome: Progressing  4/12/2025 0608 by Latrice Dunn RN  Outcome: Progressing     Problem: ABCDS Injury Assessment  Goal: Absence of physical injury  4/12/2025 1843 by True Crawford RN  Outcome: Progressing  4/12/2025 0608 by Latrice Dunn RN  Outcome: Progressing     Problem: Confusion  Goal: Confusion, delirium, dementia, or psychosis is improved or at baseline  Description: INTERVENTIONS:  1. Assess for possible contributors to thought disturbance, including medications, impaired vision or hearing, underlying metabolic abnormalities, dehydration, psychiatric diagnoses, and notify attending LIP  2. [place] high risk fall precautions, as indicated  3. Provide frequent short contacts to provide reality reorientation, refocusing and direction  4. Decrease environmental stimuli, including noise as appropriate  5. Monitor and intervene to maintain adequate nutrition, hydration, elimination, sleep and activity  6. If unable to ensure safety without constant attention obtain sitter and review sitter guidelines with assigned personnel  7. Initiate Psychosocial CNS and Spiritual Care consult, as indicated  4/12/2025 1843 by True Crawford RN  Outcome: Progressing  4/12/2025 0608 by Latrice Dunn RN  Outcome: Progressing     Problem: Pain  Goal: Verbalizes/displays adequate comfort level or baseline comfort level  4/12/2025 1843 by True Crawford RN  Outcome: Progressing  4/12/2025 0608 by Latrice Dunn RN  Outcome: Progressing

## 2025-04-13 LAB
ANION GAP SERPL CALCULATED.3IONS-SCNC: 10 MMOL/L (ref 9–16)
BUN SERPL-MCNC: 17 MG/DL (ref 6–20)
CALCIUM SERPL-MCNC: 8.9 MG/DL (ref 8.6–10.4)
CHLORIDE SERPL-SCNC: 110 MMOL/L (ref 98–107)
CO2 SERPL-SCNC: 21 MMOL/L (ref 20–31)
CREAT SERPL-MCNC: 0.6 MG/DL (ref 0.6–0.9)
ERYTHROCYTE [DISTWIDTH] IN BLOOD BY AUTOMATED COUNT: 12.9 % (ref 11.8–14.4)
GFR, ESTIMATED: >90 ML/MIN/1.73M2
GLUCOSE SERPL-MCNC: 137 MG/DL (ref 74–99)
HCT VFR BLD AUTO: 29.8 % (ref 36.3–47.1)
HCYS SERPL-SCNC: 8.2 UMOL/L (ref 0–15)
HGB BLD-MCNC: 9.3 G/DL (ref 11.9–15.1)
MCH RBC QN AUTO: 26.8 PG (ref 25.2–33.5)
MCHC RBC AUTO-ENTMCNC: 31.2 G/DL (ref 28.4–34.8)
MCV RBC AUTO: 85.9 FL (ref 82.6–102.9)
NRBC BLD-RTO: 0 PER 100 WBC
PLATELET # BLD AUTO: 261 K/UL (ref 138–453)
PMV BLD AUTO: 10.3 FL (ref 8.1–13.5)
POTASSIUM SERPL-SCNC: 4.3 MMOL/L (ref 3.7–5.3)
RBC # BLD AUTO: 3.47 M/UL (ref 3.95–5.11)
SODIUM SERPL-SCNC: 141 MMOL/L (ref 136–145)
WBC OTHER # BLD: 16.9 K/UL (ref 3.5–11.3)

## 2025-04-13 PROCEDURE — APPSS45 APP SPLIT SHARED TIME 31-45 MINUTES: Performed by: REGISTERED NURSE

## 2025-04-13 PROCEDURE — 85027 COMPLETE CBC AUTOMATED: CPT

## 2025-04-13 PROCEDURE — 2580000003 HC RX 258: Performed by: NURSE PRACTITIONER

## 2025-04-13 PROCEDURE — 85730 THROMBOPLASTIN TIME PARTIAL: CPT

## 2025-04-13 PROCEDURE — 2000000000 HC ICU R&B

## 2025-04-13 PROCEDURE — 86147 CARDIOLIPIN ANTIBODY EA IG: CPT

## 2025-04-13 PROCEDURE — 85303 CLOT INHIBIT PROT C ACTIVITY: CPT

## 2025-04-13 PROCEDURE — 85613 RUSSELL VIPER VENOM DILUTED: CPT

## 2025-04-13 PROCEDURE — 6360000002 HC RX W HCPCS: Performed by: NURSE PRACTITIONER

## 2025-04-13 PROCEDURE — 81240 F2 GENE: CPT

## 2025-04-13 PROCEDURE — 6370000000 HC RX 637 (ALT 250 FOR IP): Performed by: NURSE PRACTITIONER

## 2025-04-13 PROCEDURE — 99291 CRITICAL CARE FIRST HOUR: CPT | Performed by: STUDENT IN AN ORGANIZED HEALTH CARE EDUCATION/TRAINING PROGRAM

## 2025-04-13 PROCEDURE — 85301 ANTITHROMBIN III ANTIGEN: CPT

## 2025-04-13 PROCEDURE — 85240 CLOT FACTOR VIII AHG 1 STAGE: CPT

## 2025-04-13 PROCEDURE — 6370000000 HC RX 637 (ALT 250 FOR IP)

## 2025-04-13 PROCEDURE — 2500000003 HC RX 250 WO HCPCS: Performed by: NURSE PRACTITIONER

## 2025-04-13 PROCEDURE — 85306 CLOT INHIBIT PROT S FREE: CPT

## 2025-04-13 PROCEDURE — 81241 F5 GENE: CPT

## 2025-04-13 PROCEDURE — 85302 CLOT INHIBIT PROT C ANTIGEN: CPT

## 2025-04-13 PROCEDURE — 99024 POSTOP FOLLOW-UP VISIT: CPT | Performed by: NEUROLOGICAL SURGERY

## 2025-04-13 PROCEDURE — 6360000002 HC RX W HCPCS: Performed by: REGISTERED NURSE

## 2025-04-13 PROCEDURE — 0BH18EZ INSERTION OF ENDOTRACHEAL AIRWAY INTO TRACHEA, VIA NATURAL OR ARTIFICIAL OPENING ENDOSCOPIC: ICD-10-PCS

## 2025-04-13 PROCEDURE — 80048 BASIC METABOLIC PNL TOTAL CA: CPT

## 2025-04-13 PROCEDURE — 85610 PROTHROMBIN TIME: CPT

## 2025-04-13 PROCEDURE — 36415 COLL VENOUS BLD VENIPUNCTURE: CPT

## 2025-04-13 PROCEDURE — 83090 ASSAY OF HOMOCYSTEINE: CPT

## 2025-04-13 PROCEDURE — 85305 CLOT INHIBIT PROT S TOTAL: CPT

## 2025-04-13 PROCEDURE — 5A1945Z RESPIRATORY VENTILATION, 24-96 CONSECUTIVE HOURS: ICD-10-PCS

## 2025-04-13 RX ORDER — DEXAMETHASONE SODIUM PHOSPHATE 4 MG/ML
4 INJECTION, SOLUTION INTRA-ARTICULAR; INTRALESIONAL; INTRAMUSCULAR; INTRAVENOUS; SOFT TISSUE EVERY 12 HOURS
Status: DISCONTINUED | OUTPATIENT
Start: 2025-04-13 | End: 2025-04-15

## 2025-04-13 RX ORDER — ENOXAPARIN SODIUM 100 MG/ML
40 INJECTION SUBCUTANEOUS DAILY
Status: DISCONTINUED | OUTPATIENT
Start: 2025-04-13 | End: 2025-04-18 | Stop reason: HOSPADM

## 2025-04-13 RX ADMIN — ACETAMINOPHEN 650 MG: 325 TABLET ORAL at 09:48

## 2025-04-13 RX ADMIN — ENOXAPARIN SODIUM 40 MG: 100 INJECTION SUBCUTANEOUS at 10:00

## 2025-04-13 RX ADMIN — SODIUM CHLORIDE, PRESERVATIVE FREE 10 ML: 5 INJECTION INTRAVENOUS at 19:53

## 2025-04-13 RX ADMIN — DEXAMETHASONE SODIUM PHOSPHATE 4 MG: 4 INJECTION INTRA-ARTICULAR; INTRALESIONAL; INTRAMUSCULAR; INTRAVENOUS; SOFT TISSUE at 03:30

## 2025-04-13 RX ADMIN — OXYCODONE 10 MG: 5 TABLET ORAL at 19:53

## 2025-04-13 RX ADMIN — DEXAMETHASONE SODIUM PHOSPHATE 4 MG: 4 INJECTION INTRA-ARTICULAR; INTRALESIONAL; INTRAMUSCULAR; INTRAVENOUS; SOFT TISSUE at 17:17

## 2025-04-13 RX ADMIN — HYDRALAZINE HYDROCHLORIDE 10 MG: 20 INJECTION INTRAMUSCULAR; INTRAVENOUS at 12:03

## 2025-04-13 RX ADMIN — OXYCODONE 10 MG: 5 TABLET ORAL at 08:27

## 2025-04-13 RX ADMIN — SODIUM CHLORIDE: 0.9 INJECTION, SOLUTION INTRAVENOUS at 04:42

## 2025-04-13 RX ADMIN — PANTOPRAZOLE SODIUM 40 MG: 40 TABLET, DELAYED RELEASE ORAL at 08:21

## 2025-04-13 RX ADMIN — LEVETIRACETAM 500 MG: 100 INJECTION INTRAVENOUS at 10:35

## 2025-04-13 RX ADMIN — OXYCODONE 10 MG: 5 TABLET ORAL at 03:32

## 2025-04-13 RX ADMIN — SODIUM CHLORIDE: 0.9 INJECTION, SOLUTION INTRAVENOUS at 17:17

## 2025-04-13 RX ADMIN — AMLODIPINE BESYLATE 5 MG: 5 TABLET ORAL at 08:21

## 2025-04-13 RX ADMIN — LEVETIRACETAM 500 MG: 100 INJECTION INTRAVENOUS at 22:33

## 2025-04-13 RX ADMIN — HYDRALAZINE HYDROCHLORIDE 10 MG: 20 INJECTION INTRAMUSCULAR; INTRAVENOUS at 07:27

## 2025-04-13 NOTE — PROGRESS NOTES
Daily Progress Note  Neuro Critical Care    Patient Name: Jack Gómez  Patient : 1985  Room/Bed: 0130/0130-01  Code Status: Full  Allergies:   Allergies   Allergen Reactions    Latex Itching    Reglan [Metoclopramide] Anxiety       CHIEF COMPLAINT:      Dysarthria, aphasia     INTERVAL HISTORY    Initial Presentation (Admitted 4/10/25):  The patient is a 38 yo female with past medical history of HTN and previous hysterectomy  (secondary to endometriosis) previously on estradiol who presented to ED as a critical stroke alert.  Patient is a  and was trying to get ready for work when she had sudden onset slurring of speech, aphasia and she went flaccid and fell down.  She was brought to ED via EMS.  On arrival to ED, she had GCS of 9, E2, V3 M4 and was not following any commands.  She was immediately taken to the scanner and was found to have left posterior temporal lobe hemorrhage with left-to-right midline shift of 8 mm with partial effacement of suprasellar cistern.  She does not take any blood thinners at home.  Post scanner, her exam improved and she started having spontaneous movement of the left side and right lower extremity.  SBP at the time of presentation was 170.  She was started on Cardene drip and mannitol 1 g/kg bolus was administered.  Neurosurgery and Neuro Critical care were consulted.  On exam in the ED, patient opens eyes to voice.  She is aphasic; not able to answer questions appropriately or follow commands.  Moving the left upper and lower extremity purposefully.  Able to antigravity in the right upper extremity.  Not able to antigravity in the right lower extremity.  MRI Brain with/without contrast and MRV Head ordered STAT due to concerns for possible brain mass versus venous infarct.  Patient was intubated for airway protection prior to MRI.  MRI Brain with/without contrast showed large left frontotemporal IPH with associated edema and midline shift (1.1cm),  venous sinus and left IJ non-occlusive thrombus; unclear etiology  - MRV Head with/without contrast showed small nonocclusive thrombus within the left transverse sinus and left IJ  - Hypercoag panel sent   - No AC for at least 5-7 days post op per Neurosurgery recs     CARDIOVASCULAR:  Essential HTN  - Goal SBP<140  - Continue Norvasc 5 mg QD  - PRN Labetalol/Hydralazine  - Troponin <6  - Continue telemetry     PULMONARY:  Intubated for airway protection, imaging/OR in ED  - Extubated 4/11  - Maintaining O2 saturations on room air     RENAL/FLUID/ELECTROLYTE:  Normal renal function  - BUN 17/ Creatinine 0.6  - Monitor I&O; 3423/1600  - IVF: NS infusion at 75cc/hr  - Replace electrolytes PRN  - Daily BMP     GI/NUTRITION:  NUTRITION:  Passed bedside swallow 4/12  - Adult General diet, add high protein supplements  - Encourage PO intake, monitor calorie intake   - Consider Mirtazapine vs NG tube if no improvement   - No BM since admission (poor intake)  - Bowel regimen: Start Senokot-S daily, Glycolax PRN  - GI prophylaxis: PPI (steroids)     ID:  No known infection  - Afebrile, Tmax 36.9C  - Leukocytosis, likely reactive in setting of steroids, WBC 16.9; improved   - UA 4/11 unremarkable  - Monitor off antibiotics   - Daily CBC     HEME:   Mild acute blood loss anemia   - H&H 9.3/29.8  - Platelets 261  - Daily CBC     ENDOCRINE:  - Continue to monitor blood glucose, goal <180     OTHER:  - PT/OT/ST   - PM&R consult     PROPHYLAXIS:  Stress ulcer: PPI     DVT PROPHYLAXIS:  - SCD sleeves - Thigh High   - Start Lovenox 40mg QD subQ    DISPOSITION:  [x] To remain ICU for close neurological monitoring.    We will continue to follow along.     For any changes in exam or patient status please contact Neuro Critical Care.    At least 20 min of critical care time spent at patient bedside examining patient,reviewing the images personally, speaking with the nursing staff regarding recommendations and *.         Saumya Linn

## 2025-04-13 NOTE — PROGRESS NOTES
Neurosurgery [facility]/Resident    Daily Progress Note   Chief Complaint   Patient presents with    Cerebrovascular Accident     4/13/2025  9:27 AM    Chart reviewed.  No acute events overnight. Continues to have aphasia and some right sided neglect.     Vitals:    04/13/25 0400 04/13/25 0500 04/13/25 0600 04/13/25 0700   BP: 126/70 (!) 113/59 114/61 (!) 113/24   Pulse: 59 59 61 62   Resp: 14 16 18 12   Temp: 98.2 °F (36.8 °C)  98 °F (36.7 °C)    TempSrc:       SpO2:  96% 96% 96%   Weight:       Height:             PE:   Alert, aphasic   Motor   Moves all extremities with some right sided neglect     Incision cranial site open to air, c/d   Bone flap site full and soft       Lab Results   Component Value Date    WBC 16.9 (H) 04/13/2025    HGB 9.3 (L) 04/13/2025    HCT 29.8 (L) 04/13/2025     04/13/2025    CHOL 155 04/11/2025    TRIG 152 (H) 04/11/2025    HDL 34 (L) 04/11/2025    ALT 16 07/31/2024    AST 19 07/31/2024     04/13/2025    K 4.3 04/13/2025     (H) 04/13/2025    CREATININE 0.6 04/13/2025    BUN 17 04/13/2025    CO2 21 04/13/2025    TSH 0.59 04/10/2025    INR 1.0 04/10/2025    LABA1C 5.1 04/11/2025    CRP 4.5 05/07/2021    SEDRATE 12 05/07/2021         A/P  39 y.o. female who presents with spontaneous left temporal IPH, uncal herniation with mass effect   POD 3 s/p evacuation via L temporal craniectomy     - decrease decadron to 4 mg q12h  - keppra  - f/u pathology  - discussed cerebral angiogram with NCC   - activity as tolerated, PT and OT  - helmet ordered       Please contact neurosurgery with any changes in patients neurologic status.       Mikael Hernández, CNP  4/13/25  9:27 AM

## 2025-04-13 NOTE — PLAN OF CARE
Problem: Discharge Planning  Goal: Discharge to home or other facility with appropriate resources  4/13/2025 0615 by Latrice Dunn RN  Outcome: Progressing  4/12/2025 1843 by True Crawford RN  Outcome: Progressing     Problem: Respiratory - Adult  Goal: Achieves optimal ventilation and oxygenation  4/13/2025 0615 by Latrice Dunn RN  Outcome: Progressing  4/12/2025 1843 by True Crawford RN  Outcome: Progressing     Problem: Safety - Adult  Goal: Free from fall injury  4/13/2025 0615 by Latrice Dunn RN  Outcome: Progressing  4/12/2025 1843 by True Crawford RN  Outcome: Progressing     Problem: Neurosensory - Adult  Goal: Achieves stable or improved neurological status  4/13/2025 0615 by Latrice Dunn RN  Outcome: Progressing  4/12/2025 1843 by True Crawford RN  Outcome: Progressing  Goal: Absence of seizures  4/13/2025 0615 by Latrice Dunn RN  Outcome: Progressing  4/12/2025 1843 by True Crawford RN  Outcome: Progressing  Goal: Remains free of injury related to seizures activity  4/13/2025 0615 by Latrice Dunn RN  Outcome: Progressing  4/12/2025 1843 by True Crawford RN  Outcome: Progressing  Goal: Achieves maximal functionality and self care  4/13/2025 0615 by Latrice Dunn RN  Outcome: Progressing  4/12/2025 1843 by True Crawford RN  Outcome: Progressing     Problem: Cardiovascular - Adult  Goal: Maintains optimal cardiac output and hemodynamic stability  4/13/2025 0615 by Latrice Dunn RN  Outcome: Progressing  4/12/2025 1843 by True Crawford RN  Outcome: Progressing  Goal: Absence of cardiac dysrhythmias or at baseline  4/13/2025 0615 by Latrice Dunn RN  Outcome: Progressing  4/12/2025 1843 by True Crawford RN  Outcome: Progressing     Problem: Skin/Tissue Integrity - Adult  Goal: Skin integrity remains intact  Description: 1.  Monitor for areas of redness and/or skin breakdown  2.  Assess vascular access sites hourly  3.  Every 4-6 hours minimum:  Change oxygen

## 2025-04-13 NOTE — PROGRESS NOTES
Occupational Therapy    [facility]  Occupational Therapy Not Seen Note    DATE: 2025    NAME: Jack Gómez  MRN: [id number]   : 1985      Patient not seen this date for Occupational Therapy due to:    Other: Awaiting helmet for mobilization. Helmet ordered.     Electronically signed by Leydi Brasher OT on 2025 at 11:52 AM

## 2025-04-14 LAB
ANION GAP SERPL CALCULATED.3IONS-SCNC: 10 MMOL/L (ref 9–16)
BASOPHILS # BLD: 0.03 K/UL (ref 0–0.2)
BASOPHILS NFR BLD: 0 % (ref 0–2)
BUN SERPL-MCNC: 23 MG/DL (ref 6–20)
CALCIUM SERPL-MCNC: 8.7 MG/DL (ref 8.6–10.4)
CARDIOLIPIN IGA SER IA-ACNC: 2.9 APL (ref 0–14)
CARDIOLIPIN IGG SER IA-ACNC: 0.7 GPL (ref 0–10)
CARDIOLIPIN IGM SER IA-ACNC: 3.5 MPL (ref 0–10)
CHLORIDE SERPL-SCNC: 107 MMOL/L (ref 98–107)
CO2 SERPL-SCNC: 22 MMOL/L (ref 20–31)
CREAT SERPL-MCNC: 0.6 MG/DL (ref 0.6–0.9)
DILUTE RUSSELL VIPER VENOM TIME: NORMAL
EOSINOPHIL # BLD: <0.03 K/UL (ref 0–0.44)
EOSINOPHILS RELATIVE PERCENT: 0 % (ref 1–4)
ERYTHROCYTE [DISTWIDTH] IN BLOOD BY AUTOMATED COUNT: 12.3 % (ref 11.8–14.4)
FACTOR VIII ACTIVITY: 195 % (ref 50–150)
GFR, ESTIMATED: >90 ML/MIN/1.73M2
GLUCOSE SERPL-MCNC: 118 MG/DL (ref 74–99)
HCT VFR BLD AUTO: 31 % (ref 36.3–47.1)
HGB BLD-MCNC: 9.8 G/DL (ref 11.9–15.1)
IMM GRANULOCYTES # BLD AUTO: 0.16 K/UL (ref 0–0.3)
IMM GRANULOCYTES NFR BLD: 1 %
INR PPP: 1.1
LYMPHOCYTES NFR BLD: 3.11 K/UL (ref 1.1–3.7)
LYMPHOCYTES RELATIVE PERCENT: 26 % (ref 24–43)
MCH RBC QN AUTO: 27.3 PG (ref 25.2–33.5)
MCHC RBC AUTO-ENTMCNC: 31.6 G/DL (ref 28.4–34.8)
MCV RBC AUTO: 86.4 FL (ref 82.6–102.9)
MONOCYTES NFR BLD: 0.78 K/UL (ref 0.1–1.2)
MONOCYTES NFR BLD: 7 % (ref 3–12)
NEUTROPHILS NFR BLD: 66 % (ref 36–65)
NEUTS SEG NFR BLD: 7.98 K/UL (ref 1.5–8.1)
NRBC BLD-RTO: 0 PER 100 WBC
PARTIAL THROMBOPLASTIN TIME: 27.8 SEC (ref 23–36.5)
PLATELET # BLD AUTO: 263 K/UL (ref 138–453)
PMV BLD AUTO: 10.4 FL (ref 8.1–13.5)
POTASSIUM SERPL-SCNC: 4.1 MMOL/L (ref 3.7–5.3)
PROTEIN C ACTIVITY: 94 %
PROTHROMBIN TIME: 14.2 SEC (ref 11.7–14.9)
RBC # BLD AUTO: 3.59 M/UL (ref 3.95–5.11)
SODIUM SERPL-SCNC: 139 MMOL/L (ref 136–145)
WBC OTHER # BLD: 12.1 K/UL (ref 3.5–11.3)

## 2025-04-14 PROCEDURE — 97535 SELF CARE MNGMENT TRAINING: CPT

## 2025-04-14 PROCEDURE — 36415 COLL VENOUS BLD VENIPUNCTURE: CPT

## 2025-04-14 PROCEDURE — 97166 OT EVAL MOD COMPLEX 45 MIN: CPT

## 2025-04-14 PROCEDURE — 2500000003 HC RX 250 WO HCPCS: Performed by: NURSE PRACTITIONER

## 2025-04-14 PROCEDURE — 99233 SBSQ HOSP IP/OBS HIGH 50: CPT | Performed by: PSYCHIATRY & NEUROLOGY

## 2025-04-14 PROCEDURE — 99223 1ST HOSP IP/OBS HIGH 75: CPT | Performed by: GENERAL PRACTICE

## 2025-04-14 PROCEDURE — 99255 IP/OBS CONSLTJ NEW/EST HI 80: CPT | Performed by: PSYCHIATRY & NEUROLOGY

## 2025-04-14 PROCEDURE — 6360000002 HC RX W HCPCS: Performed by: NURSE PRACTITIONER

## 2025-04-14 PROCEDURE — 6370000000 HC RX 637 (ALT 250 FOR IP)

## 2025-04-14 PROCEDURE — 6370000000 HC RX 637 (ALT 250 FOR IP): Performed by: NURSE PRACTITIONER

## 2025-04-14 PROCEDURE — 2580000003 HC RX 258: Performed by: NURSE PRACTITIONER

## 2025-04-14 PROCEDURE — 2000000000 HC ICU R&B

## 2025-04-14 PROCEDURE — 85025 COMPLETE CBC W/AUTO DIFF WBC: CPT

## 2025-04-14 PROCEDURE — 97162 PT EVAL MOD COMPLEX 30 MIN: CPT

## 2025-04-14 PROCEDURE — 6360000002 HC RX W HCPCS: Performed by: REGISTERED NURSE

## 2025-04-14 PROCEDURE — 92523 SPEECH SOUND LANG COMPREHEN: CPT

## 2025-04-14 PROCEDURE — 97530 THERAPEUTIC ACTIVITIES: CPT

## 2025-04-14 PROCEDURE — 80048 BASIC METABOLIC PNL TOTAL CA: CPT

## 2025-04-14 RX ORDER — DIPHENHYDRAMINE HCL 25 MG
25 TABLET ORAL ONCE
Status: COMPLETED | OUTPATIENT
Start: 2025-04-14 | End: 2025-04-14

## 2025-04-14 RX ORDER — MIRTAZAPINE 15 MG/1
15 TABLET, FILM COATED ORAL NIGHTLY
Status: DISCONTINUED | OUTPATIENT
Start: 2025-04-14 | End: 2025-04-18 | Stop reason: HOSPADM

## 2025-04-14 RX ORDER — DIPHENHYDRAMINE HCL 25 MG
25 TABLET ORAL EVERY 6 HOURS PRN
Status: DISCONTINUED | OUTPATIENT
Start: 2025-04-14 | End: 2025-04-17

## 2025-04-14 RX ADMIN — SODIUM CHLORIDE, PRESERVATIVE FREE 5 ML: 5 INJECTION INTRAVENOUS at 08:28

## 2025-04-14 RX ADMIN — DEXAMETHASONE SODIUM PHOSPHATE 4 MG: 4 INJECTION INTRA-ARTICULAR; INTRALESIONAL; INTRAMUSCULAR; INTRAVENOUS; SOFT TISSUE at 19:52

## 2025-04-14 RX ADMIN — SODIUM CHLORIDE: 0.9 INJECTION, SOLUTION INTRAVENOUS at 23:59

## 2025-04-14 RX ADMIN — MIRTAZAPINE 15 MG: 15 TABLET, FILM COATED ORAL at 21:58

## 2025-04-14 RX ADMIN — ENOXAPARIN SODIUM 40 MG: 100 INJECTION SUBCUTANEOUS at 09:24

## 2025-04-14 RX ADMIN — OXYCODONE 5 MG: 5 TABLET ORAL at 08:29

## 2025-04-14 RX ADMIN — DIPHENHYDRAMINE HYDROCHLORIDE 25 MG: 25 TABLET ORAL at 09:18

## 2025-04-14 RX ADMIN — HYDRALAZINE HYDROCHLORIDE 10 MG: 20 INJECTION INTRAMUSCULAR; INTRAVENOUS at 07:22

## 2025-04-14 RX ADMIN — LEVETIRACETAM 500 MG: 100 INJECTION INTRAVENOUS at 09:22

## 2025-04-14 RX ADMIN — LEVETIRACETAM 500 MG: 100 INJECTION INTRAVENOUS at 21:58

## 2025-04-14 RX ADMIN — SODIUM CHLORIDE, PRESERVATIVE FREE 5 ML: 5 INJECTION INTRAVENOUS at 08:30

## 2025-04-14 RX ADMIN — PANTOPRAZOLE SODIUM 40 MG: 40 TABLET, DELAYED RELEASE ORAL at 09:22

## 2025-04-14 RX ADMIN — OXYCODONE 5 MG: 5 TABLET ORAL at 13:02

## 2025-04-14 RX ADMIN — SODIUM CHLORIDE, PRESERVATIVE FREE 10 ML: 5 INJECTION INTRAVENOUS at 19:52

## 2025-04-14 RX ADMIN — AMLODIPINE BESYLATE 5 MG: 5 TABLET ORAL at 09:18

## 2025-04-14 RX ADMIN — DEXAMETHASONE SODIUM PHOSPHATE 4 MG: 4 INJECTION INTRA-ARTICULAR; INTRALESIONAL; INTRAMUSCULAR; INTRAVENOUS; SOFT TISSUE at 03:44

## 2025-04-14 RX ADMIN — DIPHENHYDRAMINE HYDROCHLORIDE 25 MG: 25 TABLET ORAL at 21:58

## 2025-04-14 ASSESSMENT — PAIN DESCRIPTION - DESCRIPTORS: DESCRIPTORS: ACHING;PRESSURE

## 2025-04-14 ASSESSMENT — PAIN DESCRIPTION - LOCATION
LOCATION: HEAD
LOCATION: HEAD

## 2025-04-14 ASSESSMENT — PAIN SCALES - GENERAL
PAINLEVEL_OUTOF10: 10
PAINLEVEL_OUTOF10: 10
PAINLEVEL_OUTOF10: 5

## 2025-04-14 ASSESSMENT — PAIN DESCRIPTION - ORIENTATION: ORIENTATION: LEFT

## 2025-04-14 NOTE — PROGRESS NOTES
Facility/Department: [address] NEURO ICU  Initial Speech/Language/Cognitive Assessment    NAME: Jack Gómez  : 1985   MRN: [id number]  ADMISSION DATE: 4/10/2025  ADMITTING DIAGNOSIS: has High-risk pregnancy; Epigastric pain; Nausea; Bloating; Generalized abdominal pain; Right upper quadrant pain; Abdominal pain, right upper quadrant; Other irritable bowel syndrome; Anxiety; Abdominal pain; Intracerebral hemorrhage, nontraumatic (HCC); Intracranial hemorrhage (HCC); Altered mental status; Cerebral edema (HCC); Nineveh coma scale total score 3-8 (HCC); Uncal herniation (HCC); and Midline shift of brain due to hematoma (HCC) on their problem list.    Date of Eval: 2025   Evaluating Therapist: DIANA Chau    RECENT RESULTS  CT OF HEAD/MRI:   25 IMPRESSION:  Left temporal lobe resection cavity containing hemorrhagic products and air.  Mild adjacent edema and mass effect.     Left temporal lobe craniectomy with herniation of the brain through the  defect.  Adjacent subdural drain.       Primary Complaint:   39 female with past medical history of hysterectomy currently taking estradiol presented to ED as a critical stroke alert.  She works at [employer] and was trying to get ready for work when she had sudden onset slurring of speech, aphasia and she went flaccid and fell down.  She was brought to ED via EMS.  On arrival to ED, she had GCS of 9, E2, V3 M4 and was not following any commands.  She was immediately taken to the scanner and was found to have left posterior temporal lobe hemorrhage with left-to-right midline shift of 8 mm with partial effacement of suprasellar cistern.  She does not take any blood thinners at home.  Post scanner, her exam improved and she started having spontaneous movement of the left side and right lower extremity.  SBP at the time of presentation was 170.  She was started on Cardene drip and mannitol 1 g/kg bolus was administered.  Neurosurgery and

## 2025-04-14 NOTE — CARE COORDINATION
"Subjective:      Patient ID: Anup Miller is a 62 y.o. male.    Chief Complaint: Sleep Apnea    HPI Comments: Presents to office for review of AutoPAP therapy. Patient states improved symptoms with use of AutoPAP. Sleeping more soundly. Waking up feeling more refreshed. Improved daytime sleepiness. Patient states he is benefiting from use of the AutoPAP.         Visit Vitals    /62    Pulse 95    Resp 18    Ht 5' 9.75" (1.772 m)    Wt 115 kg (253 lb 8.5 oz)    SpO2 98%    BMI 36.64 kg/m2     Body mass index is 36.64 kg/(m^2).    Review of Systems   Constitutional: Negative.    HENT: Negative.    Respiratory: Negative.    Cardiovascular: Negative.    Musculoskeletal: Negative.    Gastrointestinal: Negative.    Neurological: Negative.    Psychiatric/Behavioral: Negative.      Objective:      Physical Exam   Constitutional: He is oriented to person, place, and time. He appears well-developed and well-nourished.   HENT:   Head: Normocephalic and atraumatic.   Nose: Nose normal.   Mouth/Throat: Uvula is midline and oropharynx is clear and moist.   Neck: Trachea normal and normal range of motion. Neck supple. No thyroid mass and no thyromegaly present.   Cardiovascular: Normal rate, regular rhythm and normal heart sounds.    Pulmonary/Chest: Effort normal and breath sounds normal. He has no wheezes. He has no rhonchi. He has no rales. Chest wall is not dull to percussion.   Abdominal: Soft. He exhibits no mass. There is no hepatosplenomegaly or splenomegaly.   Musculoskeletal: Normal range of motion. He exhibits no edema.   Neurological: He is alert and oriented to person, place, and time.   Skin: Skin is warm and dry.   Psychiatric: He has a normal mood and affect.     Personal Diagnostic Review  Autopap download: Patient wears on average 7 hrs and 14 minutes. Greater than 4 hrs 93.3 % of the time. 90% percentile pressure 16 with AHI 9 events/hr          Assessment:       1. KHOI (obstructive sleep " Case Management   Daily Progress Note       Patient Name: Jack Gómez                   YOB: 1985  Diagnosis: Intracranial hemorrhage (HCC) [I62.9]  Intracerebral hemorrhage, nontraumatic (HCC) [I61.9]  Altered mental status, unspecified altered mental status type [R41.82]                       GMLOS: 7.3 days  Length of Stay: 4  days    Anticipated Discharge Date: To be determined    Readmission Risk (Low < 19, Mod (19-27), High > 27): Readmission Risk Score: 12        Current Transitional Plan    [] Home Independently    [] Home with HC    [] Skilled Nursing Facility    [x] Acute Rehabilitation    [] Long Term Acute Care (LTAC)    [] Other:     Plan for the Stay (Medical Management) : POD #4 crani, hematoma evacuation          Workflow Continuation (Additional Notes) : Spoke with patient and mother regarding transitional planning. Referrals to [facility] and [facility].        Lenora Tony RN  April 14, 2025         "apnea)        Outpatient Encounter Prescriptions as of 1/16/2017   Medication Sig Dispense Refill    blood sugar diagnostic (BLOOD GLUCOSE TEST) Strp 1 strip by Misc.(Non-Drug; Combo Route) route 2 (two) times daily. One touch ultra      CRESTOR 10 mg tablet       dulaglutide (TRULICITY) 1.5 mg/0.5 mL PnIj Inject 1.5 mg into the skin every 7 days. 12 Syringe 1    fluticasone (FLONASE) 50 mcg/actuation nasal spray USE 2 SPRAYS IN EACH NOSTRIL ONCE DAILY. 3 Bottle 3    glimepiride (AMARYL) 4 MG tablet Take 1 tablet (4 mg total) by mouth daily with breakfast. 90 tablet 0    hydrochlorothiazide (HYDRODIURIL) 25 MG tablet Take 1 tablet (25 mg total) by mouth once daily. 90 tablet 3    LANCETS MISC 1 lancet by Misc.(Non-Drug; Combo Route) route 2 (two) times daily. One touch Ultra      losartan (COZAAR) 50 MG tablet Take 1 tablet (50 mg total) by mouth once daily. 90 tablet 3    metformin (GLUCOPHAGE) 500 MG tablet Take 1 tablet (500 mg total) by mouth 2 (two) times daily with meals. 180 tablet 1    multivitamin capsule Take 1 capsule by mouth once daily.      pantoprazole (PROTONIX) 40 MG tablet Take 1 tablet (40 mg total) by mouth once daily. 90 tablet 3    rosuvastatin (CRESTOR) 10 MG tablet Take 1 tablet (10 mg total) by mouth once daily. 90 tablet 1    XIIDRA 5 % Dpet Place 1 drop into both eyes 2 (two) times daily.  1    [DISCONTINUED] pen needle, diabetic (NOVOTWIST) 32 gauge x 1/5" Ndle Use as directed Lantus 100 each 3     No facility-administered encounter medications on file as of 1/16/2017.      Orders Placed This Encounter   Procedures    CPAP/BIPAP SUPPLIES     Order Specific Question:   Type of mask:     Answer:   Nasal     Comments:   or FFM     Order Specific Question:   Headgear?     Answer:   Yes     Order Specific Question:   Tubing?     Answer:   Yes     Order Specific Question:   Humidifier chamber?     Answer:   Yes     Order Specific Question:   Chin strap?     Answer:   Yes     " Order Specific Question:   Filters?     Answer:   Yes     Order Specific Question:   Length of need (1-99 months):     Answer:   99     Plan:   Doing well on PAP settings. Patient is compliant. Follow up in 12 months with PAP data download or call earlier if any problems.  Letter for CDL license compliance.

## 2025-04-14 NOTE — PROGRESS NOTES
Occupational Therapy  Occupational Therapy Initial Evaluation  Facility/Department: [address] NEURO ICU   Patient Name: Jack Gómez        MRN: [id number]    : 1985    Date of Service: 2025    Chief Complaint   Patient presents with    Cerebrovascular Accident     Past Medical History:  has a past medical history of Anxiety, GERD (gastroesophageal reflux disease), Nausea, PONV (postoperative nausea and vomiting), and Ulcer of gastric fundus.  Past Surgical History:  has a past surgical history that includes Tubal ligation (Bilateral, ); Ovary removal (Left); Upper gastrointestinal endoscopy (12/2/15); Hysterectomy; Upper gastrointestinal endoscopy (N/A, 2021); Cosmetic surgery; Cleft lip repair; Upper gastrointestinal endoscopy (N/A, 8/3/2021); and craniotomy (Left, 4/10/2025).    Discharge Recommendations  Discharge Recommendations: Patient would benefit from continued therapy after discharge, Patient able to tolerate 3 hours of therapy per day  OT Equipment Recommendations  Equipment Needed: Yes  Mobility Devices: ADL Assistive Devices, Walker  Walker: Rolling  ADL Assistive Devices: Grab Bars - shower, Shower Chair with back  Further therapy recommended at discharge.The patient should be able to tolerate at least 3 hours of therapy per day over 5 days or 15 hours over 7 days.   This patient may benefit from a Physical Medicine and Rehab consult.      Assessment  Performance deficits / Impairments: Decreased functional mobility ;Decreased ADL status;Decreased strength;Decreased safe awareness;Decreased cognition;Decreased endurance;Decreased balance;Decreased high-level IADLs  Assessment: Pt limited this date by cognition and impulsivity. Pt currently unsafe to return to prior living arrangements, therefore pt would continue to benefit from OT services to address deficits listed above and improve overall functional performance prior to discharge.  Prognosis: Good  Decision Making: Medium

## 2025-04-14 NOTE — DISCHARGE INSTRUCTIONS
Craniotomy Discharge Instructions     Thank you for choosing [facility] for your surgical needs. The following instructions will help to ensure your comfort and that you are well prepared after your surgery.       Post-Operative Visit:   The office is located at:    [facility]    [facility] [address], [address], main [place] [address]    [phone]     Please also call your primary care physician to schedule an appointment for further evaluation and care.       Diet:   You may resume your regular diet   Be sure to eat a well-balanced diet. Protein promotes wound healing.   Pain medication and decreased activity can cause constipation. Drink 8-10 glasses of water a day, eat fresh fruits and vegetables, and add prunes, raisins and bran cereals to your diet if you do become constipated. A stool softener taken 1-2 times a day is helpful. Dulcolax suppositories or Fleets enemas are also available without a prescription. Call our office if the problem continues.     Activity and Exercise:   No lifting greater than 5 pounds (gallon of milk) for the first few weeks after surgery.   No driving until you are seen in the office. If you have had a seizure or have visual deficits, you may not drive until cleared by a neurologist.   Avoid riding in a car for the first two weeks until you come to the office for your scheduled follow-up.   Start taking short, frequent walks in the beginning. [place], more frequent walks throughout the day are more beneficial than one long walk each day. You may gradually increase the distance; as tolerated.   If your pain increases, you may be walking too much or too far. Try backing off for a day or two and then resume slowly.   No baths, swimming or hot tub until you discuss this with your doctor.     Incision Care and Hygiene:   Your incision is closed with staples or sutures and

## 2025-04-14 NOTE — PROGRESS NOTES
Neurosurgery [facility]/Resident    Daily Progress Note   Chief Complaint   Patient presents with    Cerebrovascular Accident     4/14/2025  8:49 AM    Chart reviewed.  No acute events overnight.  No new complaints. Vitals stable. WBC 12.1, Hgb 9.8. Sitting at the side of the bed working with therapy.     Vitals:    04/14/25 0403 04/14/25 0500 04/14/25 0600 04/14/25 0700   BP: (!) 140/89 132/87 (!) 131/93 (!) 149/87   Pulse: 56 58 96 58   Resp: 12 14 16 24   Temp:   98.2 °F (36.8 °C)    TempSrc:       SpO2: 97% 96% 99% 97%   Weight:       Height:             PE:   Alert, aphasic   States \"yes\" to a lot of questions    Motor   Moves all extremities with some right sided neglect   Able to follow some commands with mimicking or repeated attempts     Incision cranial site open to air, c/d   Bone flap site full and soft       Lab Results   Component Value Date    WBC 12.1 (H) 04/14/2025    HGB 9.8 (L) 04/14/2025    HCT 31.0 (L) 04/14/2025     04/14/2025    CHOL 155 04/11/2025    TRIG 152 (H) 04/11/2025    HDL 34 (L) 04/11/2025    ALT 16 07/31/2024    AST 19 07/31/2024     04/14/2025    K 4.1 04/14/2025     04/14/2025    CREATININE 0.6 04/14/2025    BUN 23 (H) 04/14/2025    CO2 22 04/14/2025    TSH 0.59 04/10/2025    INR 1.1 04/13/2025    LABA1C 5.1 04/11/2025    CRP 4.5 05/07/2021    SEDRATE 12 05/07/2021         A/P  39 y.o. female who presents with spontaneous left temporal IPH, uncal herniation with mass effect   POD 4 s/p evacuation via L temporal craniectomy      - decrease decadron to 4 mg q12h  - keppra  - f/u pathology  - discussed cerebral angiogram with NCC   - activity as tolerated, PT and OT  - helmet ordered   - May possibly replace bone flap this admission      Please contact neurosurgery with any changes in patients neurologic status.       Electronically signed by ALEX Bee CNP on 4/14/2025 at 8:49 AM

## 2025-04-14 NOTE — PROGRESS NOTES
Physical Therapy  Facility/Department: [address] NEURO ICU   Physical Therapy Initial Evaluation    Patient Name: Jack Gómez        MRN: [id number]    : 1985    Date of Service: 2025    Chief Complaint   Patient presents with    Cerebrovascular Accident     39 female with past medical history of hysterectomy currently taking estradiol presented to ED as a critical stroke alert.  She works at [employer] and was trying to get ready for work when she had sudden onset slurring of speech, aphasia and she went flaccid and fell down.  She was brought to ED via EMS.  On arrival to ED, she had GCS of 9, E2, V3 M4 and was not following any commands.  She was immediately taken to the scanner and was found to have left posterior temporal lobe hemorrhage with left-to-right midline shift of 8 mm with partial effacement of suprasellar cistern.  She does not take any blood thinners at home.  Post scanner, her exam improved and she started having spontaneous movement of the left side and right lower extremity.      Past Medical History:  has a past medical history of Anxiety, GERD (gastroesophageal reflux disease), Nausea, PONV (postoperative nausea and vomiting), and Ulcer of gastric fundus.  Past Surgical History:  has a past surgical history that includes Tubal ligation (Bilateral, ); Ovary removal (Left); Upper gastrointestinal endoscopy (12/2/15); Hysterectomy; Upper gastrointestinal endoscopy (N/A, 2021); Cosmetic surgery; Cleft lip repair; Upper gastrointestinal endoscopy (N/A, 8/3/2021); and craniotomy (Left, 4/10/2025).    Discharge Recommendations   Further therapy recommended at discharge.The patient should be able to tolerate at least 3 hours of therapy per day over 5 days or 15 hours over 7 days.   This patient may benefit from a Physical Medicine and Rehab consult.     PT Equipment Recommendations  Equipment Needed: Yes (Pt currently requires RW and skilled assistance for  discharge.    Vision/Hearing  Vision  Vision: Impaired  Vision Exceptions: Wears glasses at all times  Hearing  Hearing: Within functional limits    Objective  Orientation  Overall Orientation Status: Impaired  Orientation Level: Disoriented X4  Cognition  Overall Cognitive Status: Exceptions  Following Commands: Follows one step commands with increased time;Follows one step commands with repetition  Attention Span: Attends with cues to redirect  Safety Judgement: Decreased awareness of need for assistance;Decreased awareness of need for safety  Problem Solving: Assistance required to generate solutions;Assistance required to correct errors made;Assistance required to identify errors made  Insights: Decreased awareness of deficits  Initiation: Requires cues for some  Sequencing: Requires cues for some  Cognition Comment: Pt with noted aphasia and impulsivity with activity. Pt easily distracted         AROM RLE (degrees)  RLE AROM: Exceptions  RLE General AROM: WFL, except DF lacking approx. 30 deg, PROM to neutral  AROM LLE (degrees)  LLE AROM : WFL  AROM RUE (degrees)  RUE AROM : WFL  AROM LUE (degrees)  LUE AROM : WFL          Strength RLE  Strength RLE: Exception  Comment: limited testing d/t cognition  R Hip Flexion: 4+/5  R Knee Flexion: 4+/5  R Knee Extension: 4+/5  R Ankle Dorsiflexion: 0/5  R Ankle Plantar flexion: 1/5  Strength LLE  Strength LLE: Exception  L Hip Flexion: 4+/5  L Knee Flexion: 4+/5  L Knee Extension: 4+/5  L Ankle Dorsiflexion: 4+/5  L Ankle Plantar Flexion: 4+/5  Strength RUE  Strength RUE: WFL  Comment: appears WFL based on functional mobility, see OT note for details  Strength LUE  Strength LUE: WFL  Comment: appears WFL based on functional mobility, see OT note for details    Mobility   Bed mobility  Supine to Sit: Stand by assistance  Sit to Supine: Stand by assistance  Scooting: Stand by assistance  Bed Mobility Comments: HOB elevated         Transfers  Sit to Stand: Minimal

## 2025-04-14 NOTE — PROGRESS NOTES
Daily Progress Note  Neuro Critical Care    Patient Name: Jack Gómez  Patient : 1985  Room/Bed: 0130/0130-01  Code Status: Full  Allergies:   Allergies   Allergen Reactions    Latex Itching    Reglan [Metoclopramide] Anxiety       CHIEF COMPLAINT:      Aphasia      INTERVAL HISTORY    Initial Presentation (Admitted 4/10/25):  The patient is a 40 yo female with past medical history of HTN and previous hysterectomy  (secondary to endometriosis) previously on estradiol who presented to ED as a critical stroke alert.  Patient is a  and was trying to get ready for work when she had sudden onset slurring of speech, aphasia and she went flaccid and fell down.  She was brought to ED via EMS.  On arrival to ED, she had GCS of 9, E2, V3 M4 and was not following any commands.  She was immediately taken to the scanner and was found to have left posterior temporal lobe hemorrhage with left-to-right midline shift of 8 mm with partial effacement of suprasellar cistern.  She does not take any blood thinners at home.  Post scanner, her exam improved and she started having spontaneous movement of the left side and right lower extremity.  SBP at the time of presentation was 170.  She was started on Cardene drip and mannitol 1 g/kg bolus was administered.  Neurosurgery and Neuro Critical care were consulted.  On exam in the ED, patient opens eyes to voice.  She is aphasic; not able to answer questions appropriately or follow commands.  Moving the left upper and lower extremity purposefully.  Able to antigravity in the right upper extremity.  Not able to antigravity in the right lower extremity.  MRI Brain with/without contrast and MRV Head ordered STAT due to concerns for possible brain mass versus venous infarct.  Patient was intubated for airway protection prior to MRI.  MRI Brain with/without contrast showed large left frontotemporal IPH with associated edema and midline shift (1.1cm), scattered  resection cavity with hemorrhagic product and air, mild adjacent edema and mass effect  - Decadron 4mg Q12h IV, taper per NSG recs  - Continue Keppra 500mg BID for seizure prophylaxis per NSG recs  - Neuro Endovascular consulted to evaluate for possible DSA to rule out AVM/AVF, tentative plan for DSA tomorrow 4/15  - Needs helmet when out of bed   - Goal SBP<140  - Neuro checks per protocol  Left transverse venous sinus and left IJ non-occlusive thrombus; unclear etiology  - MRV Head with/without contrast showed small nonocclusive thrombus within the left transverse sinus and left IJ  - Hypercoag panel sent, recommend genetic testing outpatient  - No AC for at least 5-7 days post op per Neurosurgery recs     CARDIOVASCULAR:  Essential hypertension   - Goal SBP <140  - Continue Amlodipine 5 mg QD   - Labetalol and hydralazine as needed  - Continue telemetry    PULMONARY:  - Initially intubated for airway protection/to obtain imaging in ED  - Extubated 4/11  - Oxygenating well on room air    RENAL/FLUID/ELECTROLYTE:  - BUN 23/ Creatinine 0.6  - Monitor intake and output  - IVF: NS total fluid goal 75 mL/h  - Replace electrolytes PRN  - Daily BMP    GI/NUTRITION:  NUTRITION:  ADULT DIET; Regular  ADULT ORAL NUTRITION SUPPLEMENT; Breakfast, Lunch, HS Snack, Dinner; Standard High Calorie/High Protein Oral Supplement  - Bowel regimen: Senokot and GlycoLax daily  - GI prophylaxis: PPI while on steroids  - Encourage oral intake, consider appetite stimulant if no improvement in oral intake    ID:  - Afebrile  - WBC 12.1, likely secondary to steroids  - Continue to monitor for fevers  - Daily CBC    HEME:   - H&H 9.8/31.0, stable  - Platelets 263  - Daily CBC    ENDOCRINE:  - Continue to monitor blood glucose, goal <180    OTHER:  - PT/OT/ST   - PM&R following     PROPHYLAXIS:  Stress ulcer: PPI    DVT PROPHYLAXIS:  - SCD sleeves - Thigh High   - Lovenox      DISPOSITION:  [x] To remain ICU: Close neurological monitoring  []

## 2025-04-14 NOTE — CONSULTS
Endovascular Neurosurgery Consult      Reason for evaluation: L frontotemporal IPH    SUBJECTIVE:   History of Chief Complaint:      Patient is 38 YO F with Hx of HTN, previously on Estradiol who presented on 4/10 as a critical stroke alert. Patient is , noted to develop sudden onset right sided weakness, and aphasia as well as slurred speech. On arrival patient was taken to the CT scanner, found to have posterior temporal lobe hemorrhage with LR midline shift.     Patient on exam initially improved and then worsened post MRI brain which showed possible brain mass versus venous infarct. MRI brain showed large FT IPH with associated edema and 11 mm midline shift. Non occlusive thrombus in the left Transverse sinus and left IJ vein were noted. Most MRI patient had a non reactive dilated left pupil, for which she was given 23.4% bolus as well as mannitol and then taken for emergent evacuation of hematoma.   Intra-op pathology frozen section reported a possible glioma.     Postop CTH showed evacuation of left side IPH with decreased mass effect.   Patient on 4/11 was extubated and remained with aphasia as well as right hemianopia. She does have right lower>upper extremity weakness.    NEV consulted for diagnostic angiogram to assess underlying vasculature.       Allergies  is allergic to latex and reglan [metoclopramide].  Medications  Prior to Admission medications    Medication Sig Start Date End Date Taking? Authorizing Provider   ibuprofen (ADVIL;MOTRIN) 600 MG tablet Take 1 tablet by mouth every 6 hours as needed for Pain 7/1/22   Nadya Downing DO   pantoprazole (PROTONIX) 20 MG tablet Take 2 tablets by mouth daily 11/3/21   Diego Machado MD   citalopram (CELEXA) 10 MG tablet Take 1 tablet by mouth daily 9/28/21   Jessa Bliss APRN - NP   estradiol (ESTRACE) 1 MG tablet Take 1 mg by mouth daily    Provider, MD Tatyana    Scheduled Meds:   mirtazapine  15 mg Oral Nightly

## 2025-04-14 NOTE — CONSULTS
Independent  Homemaking Responsibilities: Yes  Prior Level of Assist for Ambulation: Independent household ambulator, with or without device  Prior Level of Assist for Transfers: Independent  Active : Yes  Mode of Transportation: SUV  Occupation: Full time employment  Type of Occupation:   Additional Comments: plan to discharge to parents house, information above on parents house. 24hr assistance upon discharge.    Social History     Socioeconomic History    Marital status:    Tobacco Use    Smoking status: Never    Smokeless tobacco: Never   Vaping Use    Vaping status: Never Used   Substance and Sexual Activity    Alcohol use: No    Drug use: No     Social Drivers of Health     Financial Resource Strain: Low Risk  (2/12/2024)    Received from [facility], [facility]    Overall Financial Resource Strain (CARDIA)     Difficulty of Paying Living Expenses: Not hard at all   Food Insecurity: No Food Insecurity (2/12/2024)    Received from [facility], [facility], [facility]    Hunger Vital Sign     Within the past 12 months, you worried that your food would run out before you got the money to buy more.: Never true   Transportation Needs: No Transportation Needs (2/12/2024)    Received from [facility], [facility], [facility]    Transportation     In the past 12 months, has lack of transportation kept you from medical appointments or from getting medications?: No   Intimate Partner Violence: Unknown (2/12/2024)    Received from [facility], [facility]    Humiliation, Afraid, Rape, and Kick questionnaire     Fear of Current or Ex-Partner: No         Family History:       Problem Relation Age of Onset    Diabetes Mother     Cancer Paternal Uncle         stomach    Cancer Paternal Grandmother         breast, stomach    Diabetes Paternal Grandmother     High Blood  ulcer    Recommendations:    Diagnosis: Left frontotemporal intraparenchymal hemorrhage  Therapy: Has PT/OT/SLP needs - Min assist x 2 transfers, min to mod assist x 2 ambulation 30 feet x 2 with rolling walker, slow [place] and decreased step length/height.  Mod assist toileting.  Aphasia likely requiring SLP intervention  Medical Necessity: As above  Support: Lives with parents who can provide support  Rehab Recommendation: In my opinion the patient will require acute inpatient rehabilitation and meets criteria for [facility] level care once medically stable per primary and consulting services. Anticipate he/she will be able to tolerate 3 hours of therapy per day or 900 minutes per week in rehabilitation. The patient requires multidisciplinary rehabilitation treatment including medical management by a PM&R physician, 24 hour rehabilitation nursing, Physical/Occupational therapy, +/- speech therapy, rehabilitation social work, and nutrition services. Patient and family also require education in post-hospital precautions and home exercise routine, adaptive techniques and deficit compensation strategies, strengthening and conditioning, equipment prescription and instructions in use. Provision of services in a less intensive environment risks significant complications and more limited functional outcomes.  DVT Prophylaxis: Anticoagulation held at least 5 to 7 days postop per neurosurgery    It was my pleasure to evaluate [place] D Hairstonsims today.  Please call with questions.    Victor Hugo Jeffrey MD        This note is created with the assistance of a speech recognition program.  While intending to generate a document that actually reflects the content of the visit, the document can still have some errors including those of syntax and sound a like substitutions which may escape proof reading.  In such instances, actual meaning can be extrapolated by contextual diversion.

## 2025-04-14 NOTE — CARE COORDINATION
SW following to complete PHQ-9.  Chart reviewed, pt aphasic at this time.  Will follow and complete assessment once pt is able to participate.

## 2025-04-15 ENCOUNTER — APPOINTMENT (OUTPATIENT)
Dept: INTERVENTIONAL RADIOLOGY/VASCULAR | Age: 40
End: 2025-04-15
Payer: COMMERCIAL

## 2025-04-15 LAB
ANION GAP SERPL CALCULATED.3IONS-SCNC: 11 MMOL/L (ref 9–16)
BASOPHILS # BLD: <0.03 K/UL (ref 0–0.2)
BASOPHILS NFR BLD: 0 % (ref 0–2)
BUN SERPL-MCNC: 18 MG/DL (ref 6–20)
CALCIUM SERPL-MCNC: 9.1 MG/DL (ref 8.6–10.4)
CHLORIDE SERPL-SCNC: 104 MMOL/L (ref 98–107)
CO2 SERPL-SCNC: 23 MMOL/L (ref 20–31)
CREAT SERPL-MCNC: 0.6 MG/DL (ref 0.6–0.9)
EOSINOPHIL # BLD: <0.03 K/UL (ref 0–0.44)
EOSINOPHILS RELATIVE PERCENT: 0 % (ref 1–4)
ERYTHROCYTE [DISTWIDTH] IN BLOOD BY AUTOMATED COUNT: 11.9 % (ref 11.8–14.4)
GFR, ESTIMATED: >90 ML/MIN/1.73M2
GLUCOSE SERPL-MCNC: 126 MG/DL (ref 74–99)
HCT VFR BLD AUTO: 33.6 % (ref 36.3–47.1)
HGB BLD-MCNC: 10.7 G/DL (ref 11.9–15.1)
IMM GRANULOCYTES # BLD AUTO: 0.14 K/UL (ref 0–0.3)
IMM GRANULOCYTES NFR BLD: 2 %
LYMPHOCYTES NFR BLD: 2.24 K/UL (ref 1.1–3.7)
LYMPHOCYTES RELATIVE PERCENT: 25 % (ref 24–43)
MCH RBC QN AUTO: 26.8 PG (ref 25.2–33.5)
MCHC RBC AUTO-ENTMCNC: 31.8 G/DL (ref 28.4–34.8)
MCV RBC AUTO: 84.2 FL (ref 82.6–102.9)
MONOCYTES NFR BLD: 0.53 K/UL (ref 0.1–1.2)
MONOCYTES NFR BLD: 6 % (ref 3–12)
NEUTROPHILS NFR BLD: 67 % (ref 36–65)
NEUTS SEG NFR BLD: 5.89 K/UL (ref 1.5–8.1)
NRBC BLD-RTO: 0 PER 100 WBC
PLATELET # BLD AUTO: 289 K/UL (ref 138–453)
PMV BLD AUTO: 10.2 FL (ref 8.1–13.5)
POTASSIUM SERPL-SCNC: 4 MMOL/L (ref 3.7–5.3)
RBC # BLD AUTO: 3.99 M/UL (ref 3.95–5.11)
SODIUM SERPL-SCNC: 138 MMOL/L (ref 136–145)
SURGICAL PATHOLOGY REPORT: NORMAL
WBC OTHER # BLD: 8.8 K/UL (ref 3.5–11.3)

## 2025-04-15 PROCEDURE — 80048 BASIC METABOLIC PNL TOTAL CA: CPT

## 2025-04-15 PROCEDURE — 6360000002 HC RX W HCPCS: Performed by: NURSE PRACTITIONER

## 2025-04-15 PROCEDURE — 97535 SELF CARE MNGMENT TRAINING: CPT

## 2025-04-15 PROCEDURE — 99152 MOD SED SAME PHYS/QHP 5/>YRS: CPT

## 2025-04-15 PROCEDURE — 6370000000 HC RX 637 (ALT 250 FOR IP)

## 2025-04-15 PROCEDURE — 92507 TX SP LANG VOICE COMM INDIV: CPT

## 2025-04-15 PROCEDURE — 2500000003 HC RX 250 WO HCPCS: Performed by: NURSE PRACTITIONER

## 2025-04-15 PROCEDURE — 36227 PLACE CATH XTRNL CAROTID: CPT | Performed by: PSYCHIATRY & NEUROLOGY

## 2025-04-15 PROCEDURE — 2500000003 HC RX 250 WO HCPCS: Performed by: STUDENT IN AN ORGANIZED HEALTH CARE EDUCATION/TRAINING PROGRAM

## 2025-04-15 PROCEDURE — 36415 COLL VENOUS BLD VENIPUNCTURE: CPT

## 2025-04-15 PROCEDURE — 99232 SBSQ HOSP IP/OBS MODERATE 35: CPT | Performed by: PSYCHIATRY & NEUROLOGY

## 2025-04-15 PROCEDURE — 36224 PLACE CATH CAROTD ART: CPT

## 2025-04-15 PROCEDURE — 6370000000 HC RX 637 (ALT 250 FOR IP): Performed by: NURSE PRACTITIONER

## 2025-04-15 PROCEDURE — 6360000002 HC RX W HCPCS: Performed by: REGISTERED NURSE

## 2025-04-15 PROCEDURE — 99153 MOD SED SAME PHYS/QHP EA: CPT

## 2025-04-15 PROCEDURE — 36227 PLACE CATH XTRNL CAROTID: CPT

## 2025-04-15 PROCEDURE — 6360000004 HC RX CONTRAST MEDICATION: Performed by: PSYCHIATRY & NEUROLOGY

## 2025-04-15 PROCEDURE — APPSS45 APP SPLIT SHARED TIME 31-45 MINUTES: Performed by: REGISTERED NURSE

## 2025-04-15 PROCEDURE — B3151ZZ FLUOROSCOPY OF BILATERAL COMMON CAROTID ARTERIES USING LOW OSMOLAR CONTRAST: ICD-10-PCS | Performed by: PSYCHIATRY & NEUROLOGY

## 2025-04-15 PROCEDURE — 6360000002 HC RX W HCPCS: Performed by: STUDENT IN AN ORGANIZED HEALTH CARE EDUCATION/TRAINING PROGRAM

## 2025-04-15 PROCEDURE — C1887 CATHETER, GUIDING: HCPCS

## 2025-04-15 PROCEDURE — 2000000000 HC ICU R&B

## 2025-04-15 PROCEDURE — 97530 THERAPEUTIC ACTIVITIES: CPT

## 2025-04-15 PROCEDURE — 99152 MOD SED SAME PHYS/QHP 5/>YRS: CPT | Performed by: PSYCHIATRY & NEUROLOGY

## 2025-04-15 PROCEDURE — 36226 PLACE CATH VERTEBRAL ART: CPT

## 2025-04-15 PROCEDURE — B31D1ZZ FLUOROSCOPY OF RIGHT VERTEBRAL ARTERY USING LOW OSMOLAR CONTRAST: ICD-10-PCS | Performed by: PSYCHIATRY & NEUROLOGY

## 2025-04-15 PROCEDURE — 36224 PLACE CATH CAROTD ART: CPT | Performed by: PSYCHIATRY & NEUROLOGY

## 2025-04-15 PROCEDURE — 75710 ARTERY X-RAYS ARM/LEG: CPT

## 2025-04-15 PROCEDURE — 85025 COMPLETE CBC W/AUTO DIFF WBC: CPT

## 2025-04-15 PROCEDURE — 36226 PLACE CATH VERTEBRAL ART: CPT | Performed by: PSYCHIATRY & NEUROLOGY

## 2025-04-15 PROCEDURE — B3181ZZ FLUOROSCOPY OF BILATERAL INTERNAL CAROTID ARTERIES USING LOW OSMOLAR CONTRAST: ICD-10-PCS | Performed by: PSYCHIATRY & NEUROLOGY

## 2025-04-15 PROCEDURE — 76937 US GUIDE VASCULAR ACCESS: CPT | Performed by: PSYCHIATRY & NEUROLOGY

## 2025-04-15 PROCEDURE — B31C1ZZ FLUOROSCOPY OF BILATERAL EXTERNAL CAROTID ARTERIES USING LOW OSMOLAR CONTRAST: ICD-10-PCS | Performed by: PSYCHIATRY & NEUROLOGY

## 2025-04-15 RX ORDER — ONDANSETRON 2 MG/ML
4 INJECTION INTRAMUSCULAR; INTRAVENOUS EVERY 6 HOURS PRN
Status: DISCONTINUED | OUTPATIENT
Start: 2025-04-15 | End: 2025-04-18 | Stop reason: HOSPADM

## 2025-04-15 RX ORDER — VERAPAMIL HYDROCHLORIDE 2.5 MG/ML
INJECTION, SOLUTION INTRAVENOUS PRN
Status: COMPLETED | OUTPATIENT
Start: 2025-04-15 | End: 2025-04-15

## 2025-04-15 RX ORDER — NITROGLYCERIN 20 MG/100ML
INJECTION INTRAVENOUS CONTINUOUS PRN
Status: COMPLETED | OUTPATIENT
Start: 2025-04-15 | End: 2025-04-15

## 2025-04-15 RX ORDER — FENTANYL CITRATE 50 UG/ML
INJECTION, SOLUTION INTRAMUSCULAR; INTRAVENOUS PRN
Status: COMPLETED | OUTPATIENT
Start: 2025-04-15 | End: 2025-04-15

## 2025-04-15 RX ORDER — DEXAMETHASONE 4 MG/1
4 TABLET ORAL DAILY
Status: COMPLETED | OUTPATIENT
Start: 2025-04-15 | End: 2025-04-18

## 2025-04-15 RX ORDER — IODIXANOL 270 MG/ML
100 INJECTION, SOLUTION INTRAVASCULAR
Status: COMPLETED | OUTPATIENT
Start: 2025-04-15 | End: 2025-04-15

## 2025-04-15 RX ORDER — MIDAZOLAM HYDROCHLORIDE 5 MG/ML
INJECTION, SOLUTION INTRAMUSCULAR; INTRAVENOUS PRN
Status: COMPLETED | OUTPATIENT
Start: 2025-04-15 | End: 2025-04-15

## 2025-04-15 RX ORDER — ONDANSETRON 4 MG/1
4 TABLET, ORALLY DISINTEGRATING ORAL EVERY 8 HOURS PRN
Status: DISCONTINUED | OUTPATIENT
Start: 2025-04-15 | End: 2025-04-18 | Stop reason: HOSPADM

## 2025-04-15 RX ORDER — SODIUM CHLORIDE 9 MG/ML
INJECTION, SOLUTION INTRAVENOUS PRN
Status: DISCONTINUED | OUTPATIENT
Start: 2025-04-15 | End: 2025-04-18 | Stop reason: HOSPADM

## 2025-04-15 RX ORDER — SODIUM CHLORIDE 0.9 % (FLUSH) 0.9 %
5-40 SYRINGE (ML) INJECTION PRN
Status: DISCONTINUED | OUTPATIENT
Start: 2025-04-15 | End: 2025-04-18 | Stop reason: HOSPADM

## 2025-04-15 RX ORDER — DEXAMETHASONE 4 MG/1
2 TABLET ORAL DAILY
Status: DISCONTINUED | OUTPATIENT
Start: 2025-04-27 | End: 2025-04-18 | Stop reason: HOSPADM

## 2025-04-15 RX ORDER — LEVETIRACETAM 500 MG/1
500 TABLET ORAL 2 TIMES DAILY
Status: DISCONTINUED | OUTPATIENT
Start: 2025-04-15 | End: 2025-04-18 | Stop reason: HOSPADM

## 2025-04-15 RX ORDER — DEXAMETHASONE 4 MG/1
4 TABLET ORAL EVERY OTHER DAY
Status: DISCONTINUED | OUTPATIENT
Start: 2025-04-19 | End: 2025-04-18 | Stop reason: HOSPADM

## 2025-04-15 RX ORDER — SODIUM CHLORIDE 0.9 % (FLUSH) 0.9 %
5-40 SYRINGE (ML) INJECTION EVERY 12 HOURS SCHEDULED
Status: DISCONTINUED | OUTPATIENT
Start: 2025-04-15 | End: 2025-04-18 | Stop reason: HOSPADM

## 2025-04-15 RX ORDER — HEPARIN SODIUM 1000 [USP'U]/ML
INJECTION, SOLUTION INTRAVENOUS; SUBCUTANEOUS PRN
Status: COMPLETED | OUTPATIENT
Start: 2025-04-15 | End: 2025-04-15

## 2025-04-15 RX ADMIN — PANTOPRAZOLE SODIUM 40 MG: 40 TABLET, DELAYED RELEASE ORAL at 09:09

## 2025-04-15 RX ADMIN — Medication 200 MCG: at 14:33

## 2025-04-15 RX ADMIN — OXYCODONE 10 MG: 5 TABLET ORAL at 09:09

## 2025-04-15 RX ADMIN — DIPHENHYDRAMINE HYDROCHLORIDE 25 MG: 25 TABLET ORAL at 17:45

## 2025-04-15 RX ADMIN — ENOXAPARIN SODIUM 40 MG: 100 INJECTION SUBCUTANEOUS at 09:12

## 2025-04-15 RX ADMIN — MIDAZOLAM HYDROCHLORIDE 1 MG: 5 INJECTION, SOLUTION INTRAMUSCULAR; INTRAVENOUS at 14:30

## 2025-04-15 RX ADMIN — AMLODIPINE BESYLATE 5 MG: 5 TABLET ORAL at 09:09

## 2025-04-15 RX ADMIN — FENTANYL CITRATE 50 MCG: 50 INJECTION, SOLUTION INTRAMUSCULAR; INTRAVENOUS at 14:31

## 2025-04-15 RX ADMIN — OXYCODONE 10 MG: 5 TABLET ORAL at 17:45

## 2025-04-15 RX ADMIN — IODIXANOL 92 ML: 270 INJECTION, SOLUTION INTRAVASCULAR at 15:37

## 2025-04-15 RX ADMIN — HEPARIN SODIUM 2000 UNITS: 1000 INJECTION, SOLUTION INTRAVENOUS; SUBCUTANEOUS at 14:33

## 2025-04-15 RX ADMIN — LEVETIRACETAM 500 MG: 500 TABLET, FILM COATED ORAL at 20:08

## 2025-04-15 RX ADMIN — DIPHENHYDRAMINE HYDROCHLORIDE 25 MG: 25 TABLET ORAL at 11:07

## 2025-04-15 RX ADMIN — DEXAMETHASONE 4 MG: 4 TABLET ORAL at 20:07

## 2025-04-15 RX ADMIN — Medication 2000 MG: at 14:00

## 2025-04-15 RX ADMIN — VERAPAMIL HYDROCHLORIDE 2.5 MG: 2.5 INJECTION, SOLUTION INTRAVENOUS at 15:36

## 2025-04-15 RX ADMIN — LEVETIRACETAM 500 MG: 100 INJECTION INTRAVENOUS at 09:12

## 2025-04-15 RX ADMIN — MIRTAZAPINE 15 MG: 15 TABLET, FILM COATED ORAL at 20:07

## 2025-04-15 RX ADMIN — SODIUM CHLORIDE, PRESERVATIVE FREE 5 ML: 5 INJECTION INTRAVENOUS at 09:00

## 2025-04-15 RX ADMIN — SODIUM CHLORIDE, PRESERVATIVE FREE 10 ML: 5 INJECTION INTRAVENOUS at 20:08

## 2025-04-15 RX ADMIN — Medication 200 MCG: at 15:22

## 2025-04-15 RX ADMIN — FENTANYL CITRATE 50 MCG: 50 INJECTION, SOLUTION INTRAMUSCULAR; INTRAVENOUS at 14:26

## 2025-04-15 RX ADMIN — FENTANYL CITRATE 50 MCG: 50 INJECTION, SOLUTION INTRAMUSCULAR; INTRAVENOUS at 15:20

## 2025-04-15 RX ADMIN — Medication 200 MCG: at 15:27

## 2025-04-15 RX ADMIN — MIDAZOLAM HYDROCHLORIDE 1 MG: 5 INJECTION, SOLUTION INTRAMUSCULAR; INTRAVENOUS at 14:55

## 2025-04-15 RX ADMIN — VERAPAMIL HYDROCHLORIDE 2.5 MG: 2.5 INJECTION, SOLUTION INTRAVENOUS at 14:33

## 2025-04-15 RX ADMIN — DEXAMETHASONE SODIUM PHOSPHATE 4 MG: 4 INJECTION INTRA-ARTICULAR; INTRALESIONAL; INTRAMUSCULAR; INTRAVENOUS; SOFT TISSUE at 09:12

## 2025-04-15 ASSESSMENT — PAIN SCALES - GENERAL: PAINLEVEL_OUTOF10: 6

## 2025-04-15 NOTE — PROGRESS NOTES
Neurosurgery [facility]/Resident    Daily Progress Note   Chief Complaint   Patient presents with    Cerebrovascular Accident     4/15/2025  9:49 AM    Chart reviewed.  No acute events overnight.  No new complaints.    Vitals:    04/15/25 0300 04/15/25 0400 04/15/25 0500 04/15/25 0600   BP: 111/78 107/80 136/85 122/73   Pulse: 76 65 71 77   Resp: 17 15 15 18   Temp:  98.1 °F (36.7 °C)     TempSrc:  Oral     SpO2: 97% 96% 99%    Weight:       Height:             PE:   Alert, aphasic  Follow some commands with mimicking   Motor   Moving all extremities     Incision cranial site open to air, c/d   Bone flap site full and soft       Lab Results   Component Value Date    WBC 8.8 04/15/2025    HGB 10.7 (L) 04/15/2025    HCT 33.6 (L) 04/15/2025     04/15/2025    CHOL 155 04/11/2025    TRIG 152 (H) 04/11/2025    HDL 34 (L) 04/11/2025    ALT 16 07/31/2024    AST 19 07/31/2024     04/15/2025    K 4.0 04/15/2025     04/15/2025    CREATININE 0.6 04/15/2025    BUN 18 04/15/2025    CO2 23 04/15/2025    TSH 0.59 04/10/2025    INR 1.1 04/13/2025    LABA1C 5.1 04/11/2025    CRP 4.5 05/07/2021    SEDRATE 12 05/07/2021       A/P  39 y.o. female who presents with spontaneous left temporal IPH, uncal herniation with mass effect   POD 5 s/p evacuation via L temporal craniectomy      - continue decadron to 4 mg q12h  - keppra  - f/u pathology  - DSA with neuro endovascular   - activity as tolerated, PT and OT  - helmet when out of bed   - considering replace bone flap this admission         Please contact neurosurgery with any changes in patients neurologic status.       Mikael Hernández, CNP  4/15/25  9:49 AM

## 2025-04-15 NOTE — PROGRESS NOTES
Daily Progress Note  Neuro Critical Care    Patient Name: Jack Gómez  Patient : 1985  Room/Bed: 0130/0130-01  Code Status: Full  Allergies:   Allergies   Allergen Reactions    Latex Itching    Reglan [Metoclopramide] Anxiety       CHIEF COMPLAINT:      Aphasia      INTERVAL HISTORY    Initial Presentation (Admitted 4/10/25):  The patient is a 40 yo female with past medical history of HTN and previous hysterectomy  (secondary to endometriosis) previously on estradiol who presented to ED as a critical stroke alert.  Patient is a  and was trying to get ready for work when she had sudden onset slurring of speech, aphasia and she went flaccid and fell down.  She was brought to ED via EMS.  On arrival to ED, she had GCS of 9, E2, V3 M4 and was not following any commands.  She was immediately taken to the scanner and was found to have left posterior temporal lobe hemorrhage with left-to-right midline shift of 8 mm with partial effacement of suprasellar cistern.  She does not take any blood thinners at home.  Post scanner, her exam improved and she started having spontaneous movement of the left side and right lower extremity.  SBP at the time of presentation was 170.  She was started on Cardene drip and mannitol 1 g/kg bolus was administered.  Neurosurgery and Neuro Critical care were consulted.  On exam in the ED, patient opens eyes to voice.  She is aphasic; not able to answer questions appropriately or follow commands.  Moving the left upper and lower extremity purposefully.  Able to antigravity in the right upper extremity.  Not able to antigravity in the right lower extremity.  MRI Brain with/without contrast and MRV Head ordered STAT due to concerns for possible brain mass versus venous infarct.  Patient was intubated for airway protection prior to MRI.  MRI Brain with/without contrast showed large left frontotemporal IPH with associated edema and midline shift (1.1cm), scattered  (preliminary) 1 of 1   ADDENDUM:   Additional images acquired due to the left upper abdomen sling car    structure   1st seen on the earlier images.  The structure is no longer present on the   additional images and NG tube has been passed since the earlier images.         Final   There is cylindrical structure about 10 cm in length and 4 mm wide projecting   in the left upper abdomen of uncertain etiology. Part of it is radiopaque.   Indeterminate on this single AP view whether this is within or external to   the patient. Please correlate with direct inspection.      Otherwise no evidence for metallic foreign body within the patient.         CT HEAD WO CONTRAST   Final Result   Acute intraparenchymal hemorrhage in the posterior left temporal lobe   measuring 6.0 x 4.2 x 4.0 cm with surrounding edema.  There is left-to-right   midline shift measuring 8 mm with partial effacement of the suprasellar   cistern.      Results discussed with Dr. Carlisle at 0803 hours on 4/10/2025.         CTA HEAD NECK W CONTRAST   Final Result   1. No evidence of active bleeding.   2. 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   3. Intraparenchymal hemorrhage in the posterior left temporal lobe with   associated left-to-right midline shift, not appreciably changed.         IR ANGIOGRAM CAROTID C EREBRAL BILATERAL    (Results Pending)       Labs and Images reviewed with:  [] Dr. Cara Woodard  [x] Dr. Deyvi Mobley  [] There are no new interval images to review.     PHYSICAL EXAM       CONSTITUTIONAL:  Well developed, moderate aphasia, alert and oriented x3    HEAD:  normocephalic, atraumatic    EYES:  PERRL, EOMI.   ENT:  moist mucous membranes   NECK:  supple, symmetric   LUNGS:  Equal air entry bilaterally   CARDIOVASCULAR:  normal s1 / s2, RRR, distal pulses intact   ABDOMEN:  Soft, no rigidity, normal bowel sounds in all 4 quadrants   NEUROLOGIC:  Mental Status:  Awake, alert, oriented x3, moderate aphasia

## 2025-04-15 NOTE — PROGRESS NOTES
Physical Therapy        Physical Therapy Cancel Note      DATE: 4/15/2025    NAME: Jack Gómez  MRN: [id number]   : 1985      Patient not seen this date for Physical Therapy due to:    Surgery/Procedure: Pt not in room d/t IR    Will check back on tomorrow.      Electronically signed by JESICA Posada on 4/15/2025 at 3:59 PM     Treatment performed by Student PTA under the supervision of co-signing PTA who agrees with all treatment and documentation.    Paras Kerr PTA

## 2025-04-15 NOTE — BRIEF OP NOTE
[facility]    NEUROENDOVASCULAR SERVICE: POST-OP NOTE: 4/15/2025    Pt Name: Jack Gómez  MRN: [id number]  YOB: 1985  Date of Procedure: 4/15/2025  Primary Care Physician: Debbie Villareal MD        Pre-Procedural Diagnosis: Left temporal hemorrhage secondary to possible neoplastic process  Post-Procedural Diagnosis: Same      Procedure Performed:Diagnostic Cerebral Angiogram    Surgeon:   Keturah Beltrán MD    Fellow:  Brandon Mayfield MD and Pb Haney MD PhD     Assisting Tech:  Polly Lewis    PRE-PROCEDURAL EXAM:  Neurological exam performed and unchanged from initial H&P or consult      Anesthesia: IV Moderate Sedation  An Immediate re-assessment was completed prior to sedation, and it is determined to be safe to proceed.  Complications: none    Intra-Operative EXAM:  Patient sedated with unchanged limited neurological exam    EBL: < Minimal      Cc            Specimens: Were not Obtained  Contrast:     Visipaque 270 low osmolar 92 Cc             Fluoro: 15.7 min    Findings:  Please see dictated Radiology note for further details  No convincing evidence of AVM, dural AV fistula, cerebral aneurysms or significant tumor blush.   Left hypoplastic transverse sinus.                 POST-PROCEDURAL EXAM :   Stable neurological Exam  Neurological exam performed and unchanged from initial H&P or consult    Closure:  right TR Band 5   F        POST-PROCEDURAL MONITORING : see orders  Disposition: Neuro ICU      Recommendations:  Back to Neuro ICU  Peripheral pulse checks per protocol.  SBP goal per primary team  Follow up with Pb Haney MD PhD  12 weeks after discharge and Dr. Beltrán 6 months after discharge.        MD Keturah Shelton MD   Pager: [phone]  Stroke, Neurocritical Care & Neurointervention  [facility]  [facility] Stroke [facility] [place]  Electronically

## 2025-04-15 NOTE — PROGRESS NOTES
Occupational Therapy  Occupational Therapy Daily Treatment Note  Facility/Department: [address] NEURO ICU   Patient Name: Jack Gómez        MRN: [id number]    : 1985    Date of Service: 4/15/2025    Chief Complaint   Patient presents with    Cerebrovascular Accident     Past Medical History:  has a past medical history of Anxiety, GERD (gastroesophageal reflux disease), Nausea, PONV (postoperative nausea and vomiting), and Ulcer of gastric fundus.  Past Surgical History:  has a past surgical history that includes Tubal ligation (Bilateral, ); Ovary removal (Left); Upper gastrointestinal endoscopy (12/2/15); Hysterectomy; Upper gastrointestinal endoscopy (N/A, 2021); Cosmetic surgery; Cleft lip repair; Upper gastrointestinal endoscopy (N/A, 8/3/2021); and craniotomy (Left, 4/10/2025).    Discharge Recommendations  Discharge Recommendations: Patient would benefit from continued therapy after discharge, Patient able to tolerate 3 hours of therapy per day     Assessment  Performance deficits / Impairments: Decreased functional mobility ;Decreased ADL status;Decreased strength;Decreased safe awareness;Decreased cognition;Decreased endurance;Decreased balance;Decreased high-level IADLs  Assessment: Patient agreed to OT treatment this date and was very pleasant and cooperative. Pt completed bed mobility with SBA, sit to stand transfers with CGA, functional mobility with minAx1-CGA, and various self-care and functional tasks at CGA-MinAx1 with setup and increased time required. Pt was limited by cognition and impulsivity this date, requiring max verbal and tactile cues throughout session to maintain pt safety. Pt is currently unsafe to return to prior living arrangements, therefore pt would continue to benefit from OT services to continue to address deficits listed above and improve overall functional performance prior to discharge.  Prognosis: Good  Decision Making: Medium Complexity  REQUIRES OT

## 2025-04-15 NOTE — CARE COORDINATION
Case Management   Daily Progress Note       Patient Name: Jack Gómez                   YOB: 1985  Diagnosis: Intracranial hemorrhage (HCC) [I62.9]  Intracerebral hemorrhage, nontraumatic (HCC) [I61.9]  Altered mental status, unspecified altered mental status type [R41.82]                       GMLOS: 7.3 days  Length of Stay: 5  days    Anticipated Discharge Date: To be determined    Readmission Risk (Low < 19, Mod (19-27), High > 27): Readmission Risk Score: 9.7        Current Transitional Plan    [] Home Independently    [] Home with HC    [] Skilled Nursing Facility    [x] Acute Rehabilitation    [] Long Term Acute Care (LTAC)    [] Other:     Plan for the Stay (Medical Management) : Plan for diagnostic angiogram today.          Workflow Continuation (Additional Notes) : Phone call from Mandy with [facility]. They are accepting.     JOHNATHAN Rosa with [facility]. They can accept and are in network with insurance.        Lenora Tony RN  April 15, 2025

## 2025-04-15 NOTE — PLAN OF CARE
--    DSA findings reviewed.    Plan  Discharge planning  F/u final pathology  Follow up in 2 weeks post op for incision site check and CT head   Wean decadron     NEUROSURGERY TO SIGN OFF     Please contact Neurosurgery with any questions.    PATIENT TO FOLLOW UP IN CLINIC: 2 weeks   ---  Follow-up with Neurosurgery  [facility]  [address] [place] [address]/[facility] (Medical Office Building 2)  [address]  Call [phone] for an appointment.  --

## 2025-04-15 NOTE — PROGRESS NOTES
Speech Language Pathology  [facility]    Speech Language Treatment Note    Date: 4/15/2025  Patient’s Name: Jack Gómez  MRN: [id number]  Diagnosis:   Patient Active Problem List   Diagnosis Code    High-risk pregnancy O09.90    Epigastric pain R10.13    Nausea R11.0    Bloating R14.0    Generalized abdominal pain R10.84    Right upper quadrant pain R10.11    Abdominal pain, right upper quadrant R10.11    Other irritable bowel syndrome K58.8    Anxiety F41.9    Abdominal pain R10.9    Intracerebral hemorrhage, nontraumatic (HCC) I61.9    Intracranial hemorrhage (HCC) I62.9    Altered mental status R41.82    Cerebral edema (HCC) G93.6    [place] coma scale total score 3-8 (HCC) R40.2430    Uncal herniation (HCC) G93.5    Midline shift of brain due to hematoma (HCC) S06.2XAA, S06.A0XA       Pain: 0/10    Speech and Language Treatment  Treatment time: 8455-2201    Subjective: [x] Alert [x] Cooperative     [] Confused     [] Agitated    [] Lethargic      Objective/Assessment:  Auditory Comprehension: Yes/no simple/bio: 2/8 increased to 5/8 with max verbal, visual cues and repetitions.    Wh- questions simple: 0/5 increased to 2/5 with phonemic cues, verbal and visual cues    Verbal Expression: Pt initiating fluent, spontaneous appropriate sentences and questions with occasional paraphasias/word finding difficulty, however receptive difficulty impacts conversation.     Automatic speech: Months of the year- max verbal and phonemic cues to initiate, then  with min phonemic cues. MURTAZA: 0/7 increased to 3/7 with phonemic cues, models, written cues.     Picture namin/5 increased to 2/5 with phonemic, max verbal cues.     Phrase completion: 1/4 increased to 3/4 with phonemic cues, repetition/multiple attempts    Education provided re compensatory strategies for aphasia (gestures/visuals, circumlocution, phonemic cues). Pt and family verbalized understanding    Reading Comprehension: read 2/4

## 2025-04-15 NOTE — PLAN OF CARE
Problem: Discharge Planning  Goal: Discharge to home or other facility with appropriate resources  4/15/2025 0535 by Kateryna Meneses RN  Outcome: Progressing  4/15/2025 0050 by Kateryna Meneses RN  Outcome: Progressing  Flowsheets (Taken 4/14/2025 2000)  Discharge to home or other facility with appropriate resources:   Identify barriers to discharge with patient and caregiver   Arrange for needed discharge resources and transportation as appropriate   Identify discharge learning needs (meds, wound care, etc)   Arrange for interpreters to assist at discharge as needed   Refer to discharge planning if patient needs post-hospital services based on physician order or complex needs related to functional status, cognitive ability or social support system     Problem: Respiratory - Adult  Goal: Achieves optimal ventilation and oxygenation  4/15/2025 0535 by Kateryna Meneses RN  Outcome: Progressing  4/15/2025 0050 by Kateryna Meneses RN  Outcome: Progressing  Flowsheets (Taken 4/14/2025 2000)  Achieves optimal ventilation and oxygenation:   Assess for changes in mentation and behavior   Assess for changes in respiratory status   Position to facilitate oxygenation and minimize respiratory effort   Oxygen supplementation based on oxygen saturation or arterial blood gases     Problem: Safety - Adult  Goal: Free from fall injury  4/15/2025 0535 by Kateryna Meneses RN  Outcome: Progressing  4/15/2025 0050 by Kateryna Meneses RN  Outcome: Progressing  Flowsheets (Taken 4/14/2025 1929)  Free From Fall Injury: Instruct family/caregiver on patient safety     Problem: Neurosensory - Adult  Goal: Achieves stable or improved neurological status  4/15/2025 0535 by Kateryna Meneses RN  Outcome: Progressing  4/15/2025 0050 by Kateryna Meneses RN  Outcome: Progressing  Flowsheets (Taken 4/14/2025 2000)  Achieves stable or improved neurological status:   Assess for and report changes in neurological status   Initiate measures to prevent  increased intracranial pressure   Maintain blood pressure and fluid volume within ordered parameters to optimize cerebral perfusion and minimize risk of hemorrhage   Monitor temperature, glucose, and sodium. Initiate appropriate interventions as ordered  Goal: Absence of seizures  4/15/2025 0535 by Kateryna Meneses RN  Outcome: Progressing  4/15/2025 0050 by Kateryna Meneses RN  Outcome: Progressing  Flowsheets (Taken 4/14/2025 2000)  Absence of seizures:   Monitor for seizure activity.  If seizure occurs, document type and location of movements and any associated apnea   If seizure occurs, turn head to side and suction secretions as needed   Administer anticonvulsants as ordered   Diagnostic studies as ordered   Support airway/breathing, administer oxygen as needed  Goal: Remains free of injury related to seizures activity  4/15/2025 0535 by Kateryna Meneses RN  Outcome: Progressing  4/15/2025 0050 by Kateryna Meneses RN  Outcome: Progressing  Flowsheets (Taken 4/14/2025 2000)  Remains free of injury related to seizure activity:   Maintain airway, patient safety  and administer oxygen as ordered   Monitor patient for seizure activity, document and report duration and description of seizure to Licensed Independent Practitioner   If seizure occurs, turn patient to side and suction secretions as needed   Reorient patient post seizure  Goal: Achieves maximal functionality and self care  4/15/2025 0535 by Kateryna Meneses RN  Outcome: Progressing  4/15/2025 0050 by Kateryna Meneses RN  Outcome: Progressing  Flowsheets (Taken 4/14/2025 2000)  Achieves maximal functionality and self care:   Monitor swallowing and airway patency with patient fatigue and changes in neurological status   Encourage and assist patient to increase activity and self care with guidance from physical therapy/occupational therapy   Encourage visually impaired, hearing impaired and aphasic patients to use assistive/communication devices     Problem:

## 2025-04-15 NOTE — PROGRESS NOTES
Endovascular Neurosurgery Progress Note       Reason for evaluation: L frontotemporal IPH    SUBJECTIVE:   No acute events from neuroendovascular perspective    History of Chief Complaint:      Patient is 40 YO F with Hx of HTN, previously on Estradiol who presented on 4/10 as a critical stroke alert. Patient is , noted to develop sudden onset right sided weakness, and aphasia as well as slurred speech. On arrival patient was taken to the CT scanner, found to have posterior temporal lobe hemorrhage with LR midline shift.     Patient on exam initially improved and then worsened post MRI brain which showed possible brain mass versus venous infarct. MRI brain showed large FT IPH with associated edema and 11 mm midline shift. Non occlusive thrombus in the left Transverse sinus and left IJ vein were noted. Most MRI patient had a non reactive dilated left pupil, for which she was given 23.4% bolus as well as mannitol and then taken for emergent evacuation of hematoma.   Intra-op pathology frozen section reported a possible glioma.     Postop CTH showed evacuation of left side IPH with decreased mass effect.   Patient on 4/11 was extubated and remained with aphasia as well as right hemianopia. She does have right lower>upper extremity weakness.    NEV consulted for diagnostic angiogram to assess underlying vasculature.       Allergies  is allergic to latex and reglan [metoclopramide].  Medications  Prior to Admission medications    Medication Sig Start Date End Date Taking? Authorizing Provider   ibuprofen (ADVIL;MOTRIN) 600 MG tablet Take 1 tablet by mouth every 6 hours as needed for Pain 7/1/22   Nadya Downing DO   pantoprazole (PROTONIX) 20 MG tablet Take 2 tablets by mouth daily 11/3/21   Diego Machado MD   citalopram (CELEXA) 10 MG tablet Take 1 tablet by mouth daily 9/28/21   Jessa Bliss APRN - NP   estradiol (ESTRACE) 1 MG tablet Take 1 mg by mouth daily    Provider, Historical,  noted.   Cvs: RRR, S1 S2 normal  Resp: symmetric unlabored breathing  Abd: s/nd/nt  Ext: no edema  Skin: no lesions seen, warm and dry    Neuro:  Gen: awake and alert, oriented x3.   Lang/speech: Receptive > expressive aphasia.  CN: PERRL, EOMI, right hemianopsia, V1-3 intact, face symmetric, hearing intact, shoulder shrug symmetric, tongue midline  Motor: 2+/5 R lower extremity and 4/5 right upper extremity. 5/5 left upper and lower extremities  Sense: difficult to assess but seemed preserved  Coord: Cannot accurately assess given current condition  DTR: deferred  Gait: narrow base gait    NIH Stroke Scale:   1a  Level of consciousness: 0 - alert; keenly responsive   1b. LOC questions:  2 - answers neither question correctly   1c. LOC commands: 2 - performs neither task correctly   2.  Best Gaze: 0 - normal   3. Visual: 2 - complete hemianopia   4. Facial Palsy: 0 - normal symmetric movement   5a. Motor left arm: 0 - no drift, limb holds 90 (or 45) degrees for full 10 seconds   5b.  Motor right arm: 2 - some effort against gravity, limb cannot get to or maintain (if cued) 90 (or 45) degrees, drifts down to bed, but has some effort against gravity    6a. Motor left le - no drift; leg holds 30 degree position for full 5 seconds   6b  Motor right leg:  3 - no effort against gravity; leg falls to bed immediately   7. Limb Ataxia: 0 - absent   8.  Sensory: 0 - normal; no sensory loss   9. Best Language:  2 - severe aphasia; all communication is through fragmentary expression; great need for inference, questioning, and guessing by the listener.  Range of information that can be exchanged is limited; listener carries burden of communication.  Examiner cannot identify materials provided from patient response.    10. Dysarthria: 2 - severe; patient speech is so slurred as to be unintelligible in the absence of or our of proportion to any dysphagia, or is mute/anarthric    11. Extinction and Inattention: 0 - no

## 2025-04-15 NOTE — PLAN OF CARE
Problem: Discharge Planning  Goal: Discharge to home or other facility with appropriate resources  Outcome: Progressing  Flowsheets (Taken 4/14/2025 2000)  Discharge to home or other facility with appropriate resources:   Identify barriers to discharge with patient and caregiver   Arrange for needed discharge resources and transportation as appropriate   Identify discharge learning needs (meds, wound care, etc)   Arrange for interpreters to assist at discharge as needed   Refer to discharge planning if patient needs post-hospital services based on physician order or complex needs related to functional status, cognitive ability or social support system     Problem: Respiratory - Adult  Goal: Achieves optimal ventilation and oxygenation  Outcome: Progressing  Flowsheets (Taken 4/14/2025 2000)  Achieves optimal ventilation and oxygenation:   Assess for changes in mentation and behavior   Assess for changes in respiratory status   Position to facilitate oxygenation and minimize respiratory effort   Oxygen supplementation based on oxygen saturation or arterial blood gases     Problem: Safety - Adult  Goal: Free from fall injury  Outcome: Progressing  Flowsheets (Taken 4/14/2025 1929)  Free From Fall Injury: Instruct family/caregiver on patient safety     Problem: Neurosensory - Adult  Goal: Achieves stable or improved neurological status  Outcome: Progressing  Flowsheets (Taken 4/14/2025 2000)  Achieves stable or improved neurological status:   Assess for and report changes in neurological status   Initiate measures to prevent increased intracranial pressure   Maintain blood pressure and fluid volume within ordered parameters to optimize cerebral perfusion and minimize risk of hemorrhage   Monitor temperature, glucose, and sodium. Initiate appropriate interventions as ordered  Goal: Absence of seizures  Outcome: Progressing  Flowsheets (Taken 4/14/2025 2000)  Absence of seizures:   Monitor for seizure activity.  If  seizure occurs, document type and location of movements and any associated apnea   If seizure occurs, turn head to side and suction secretions as needed   Administer anticonvulsants as ordered   Diagnostic studies as ordered   Support airway/breathing, administer oxygen as needed  Goal: Remains free of injury related to seizures activity  Outcome: Progressing  Flowsheets (Taken 4/14/2025 2000)  Remains free of injury related to seizure activity:   Maintain airway, patient safety  and administer oxygen as ordered   Monitor patient for seizure activity, document and report duration and description of seizure to Licensed Independent Practitioner   If seizure occurs, turn patient to side and suction secretions as needed   Reorient patient post seizure  Goal: Achieves maximal functionality and self care  Outcome: Progressing  Flowsheets (Taken 4/14/2025 2000)  Achieves maximal functionality and self care:   Monitor swallowing and airway patency with patient fatigue and changes in neurological status   Encourage and assist patient to increase activity and self care with guidance from physical therapy/occupational therapy   Encourage visually impaired, hearing impaired and aphasic patients to use assistive/communication devices     Problem: Cardiovascular - Adult  Goal: Maintains optimal cardiac output and hemodynamic stability  Outcome: Progressing  Flowsheets (Taken 4/14/2025 2000)  Maintains optimal cardiac output and hemodynamic stability:   Monitor blood pressure and heart rate   Monitor urine output and notify Licensed Independent Practitioner for values outside of normal range   Administer fluid and/or volume expanders as ordered   Assess for signs of decreased cardiac output  Goal: Absence of cardiac dysrhythmias or at baseline  Outcome: Progressing  Flowsheets (Taken 4/14/2025 2000)  Absence of cardiac dysrhythmias or at baseline:   Monitor cardiac rate and rhythm   Assess for signs of decreased cardiac output

## 2025-04-16 ENCOUNTER — APPOINTMENT (OUTPATIENT)
Dept: CT IMAGING | Age: 40
End: 2025-04-16
Payer: COMMERCIAL

## 2025-04-16 LAB
ANION GAP SERPL CALCULATED.3IONS-SCNC: 12 MMOL/L (ref 9–16)
AT III AG ACT/NOR PPP IA: 112 % (ref 82–136)
BASOPHILS # BLD: 0.04 K/UL (ref 0–0.2)
BASOPHILS NFR BLD: 0 % (ref 0–2)
BUN SERPL-MCNC: 19 MG/DL (ref 6–20)
CALCIUM SERPL-MCNC: 9.2 MG/DL (ref 8.6–10.4)
CHLORIDE SERPL-SCNC: 102 MMOL/L (ref 98–107)
CO2 SERPL-SCNC: 23 MMOL/L (ref 20–31)
CREAT SERPL-MCNC: 0.6 MG/DL (ref 0.6–0.9)
EOSINOPHIL # BLD: <0.03 K/UL (ref 0–0.44)
EOSINOPHILS RELATIVE PERCENT: 0 % (ref 1–4)
ERYTHROCYTE [DISTWIDTH] IN BLOOD BY AUTOMATED COUNT: 11.9 % (ref 11.8–14.4)
GFR, ESTIMATED: >90 ML/MIN/1.73M2
GLUCOSE SERPL-MCNC: 114 MG/DL (ref 74–99)
HCT VFR BLD AUTO: 35.9 % (ref 36.3–47.1)
HGB BLD-MCNC: 11.3 G/DL (ref 11.9–15.1)
IMM GRANULOCYTES # BLD AUTO: 0.2 K/UL (ref 0–0.3)
IMM GRANULOCYTES NFR BLD: 2 %
LYMPHOCYTES NFR BLD: 3.09 K/UL (ref 1.1–3.7)
LYMPHOCYTES RELATIVE PERCENT: 28 % (ref 24–43)
MCH RBC QN AUTO: 27.1 PG (ref 25.2–33.5)
MCHC RBC AUTO-ENTMCNC: 31.5 G/DL (ref 28.4–34.8)
MCV RBC AUTO: 86.1 FL (ref 82.6–102.9)
MONOCYTES NFR BLD: 0.65 K/UL (ref 0.1–1.2)
MONOCYTES NFR BLD: 6 % (ref 3–12)
NEUTROPHILS NFR BLD: 64 % (ref 36–65)
NEUTS SEG NFR BLD: 7.26 K/UL (ref 1.5–8.1)
NRBC BLD-RTO: 0 PER 100 WBC
PLATELET # BLD AUTO: 315 K/UL (ref 138–453)
PMV BLD AUTO: 10.3 FL (ref 8.1–13.5)
POTASSIUM SERPL-SCNC: 3.7 MMOL/L (ref 3.7–5.3)
PROT C AG ACT/NOR PPP IA: >95 % (ref 63–153)
PROT S AG ACT/NOR PPP IA: 128 % (ref 63–126)
PROT S FREE AG ACT/NOR PPP IA: 126 % (ref 55–123)
RBC # BLD AUTO: 4.17 M/UL (ref 3.95–5.11)
SODIUM SERPL-SCNC: 137 MMOL/L (ref 136–145)
WBC OTHER # BLD: 11.3 K/UL (ref 3.5–11.3)

## 2025-04-16 PROCEDURE — 6370000000 HC RX 637 (ALT 250 FOR IP)

## 2025-04-16 PROCEDURE — 6370000000 HC RX 637 (ALT 250 FOR IP): Performed by: NURSE PRACTITIONER

## 2025-04-16 PROCEDURE — 99233 SBSQ HOSP IP/OBS HIGH 50: CPT | Performed by: PSYCHIATRY & NEUROLOGY

## 2025-04-16 PROCEDURE — 99232 SBSQ HOSP IP/OBS MODERATE 35: CPT | Performed by: PSYCHIATRY & NEUROLOGY

## 2025-04-16 PROCEDURE — 70450 CT HEAD/BRAIN W/O DYE: CPT

## 2025-04-16 PROCEDURE — 6360000002 HC RX W HCPCS: Performed by: REGISTERED NURSE

## 2025-04-16 PROCEDURE — 2500000003 HC RX 250 WO HCPCS: Performed by: STUDENT IN AN ORGANIZED HEALTH CARE EDUCATION/TRAINING PROGRAM

## 2025-04-16 PROCEDURE — 92507 TX SP LANG VOICE COMM INDIV: CPT

## 2025-04-16 PROCEDURE — 80048 BASIC METABOLIC PNL TOTAL CA: CPT

## 2025-04-16 PROCEDURE — 2500000003 HC RX 250 WO HCPCS: Performed by: NURSE PRACTITIONER

## 2025-04-16 PROCEDURE — 85025 COMPLETE CBC W/AUTO DIFF WBC: CPT

## 2025-04-16 PROCEDURE — 36415 COLL VENOUS BLD VENIPUNCTURE: CPT

## 2025-04-16 PROCEDURE — 6360000002 HC RX W HCPCS: Performed by: NURSE PRACTITIONER

## 2025-04-16 PROCEDURE — 2060000000 HC ICU INTERMEDIATE R&B

## 2025-04-16 PROCEDURE — 97530 THERAPEUTIC ACTIVITIES: CPT

## 2025-04-16 RX ORDER — CYCLOBENZAPRINE HCL 10 MG
10 TABLET ORAL 3 TIMES DAILY PRN
Status: DISCONTINUED | OUTPATIENT
Start: 2025-04-16 | End: 2025-04-18 | Stop reason: HOSPADM

## 2025-04-16 RX ORDER — SENNA AND DOCUSATE SODIUM 50; 8.6 MG/1; MG/1
2 TABLET, FILM COATED ORAL DAILY
Status: DISCONTINUED | OUTPATIENT
Start: 2025-04-16 | End: 2025-04-17

## 2025-04-16 RX ADMIN — SODIUM CHLORIDE, PRESERVATIVE FREE 10 ML: 5 INJECTION INTRAVENOUS at 20:15

## 2025-04-16 RX ADMIN — DIPHENHYDRAMINE HYDROCHLORIDE 25 MG: 25 TABLET ORAL at 07:02

## 2025-04-16 RX ADMIN — LEVETIRACETAM 500 MG: 500 TABLET, FILM COATED ORAL at 20:13

## 2025-04-16 RX ADMIN — AMLODIPINE BESYLATE 5 MG: 5 TABLET ORAL at 08:06

## 2025-04-16 RX ADMIN — ACETAMINOPHEN 650 MG: 325 TABLET ORAL at 15:57

## 2025-04-16 RX ADMIN — OXYCODONE 10 MG: 5 TABLET ORAL at 10:53

## 2025-04-16 RX ADMIN — HYPROMELLOSE 2910 1 DROP: 5 SOLUTION OPHTHALMIC at 10:41

## 2025-04-16 RX ADMIN — DIPHENHYDRAMINE HYDROCHLORIDE 25 MG: 25 TABLET ORAL at 13:44

## 2025-04-16 RX ADMIN — CYCLOBENZAPRINE 10 MG: 10 TABLET, FILM COATED ORAL at 13:44

## 2025-04-16 RX ADMIN — OXYCODONE 10 MG: 5 TABLET ORAL at 15:57

## 2025-04-16 RX ADMIN — ENOXAPARIN SODIUM 40 MG: 100 INJECTION SUBCUTANEOUS at 08:04

## 2025-04-16 RX ADMIN — SODIUM CHLORIDE, PRESERVATIVE FREE 10 ML: 5 INJECTION INTRAVENOUS at 08:05

## 2025-04-16 RX ADMIN — MIRTAZAPINE 15 MG: 15 TABLET, FILM COATED ORAL at 20:13

## 2025-04-16 RX ADMIN — SODIUM CHLORIDE, PRESERVATIVE FREE 10 ML: 5 INJECTION INTRAVENOUS at 08:04

## 2025-04-16 RX ADMIN — PANTOPRAZOLE SODIUM 40 MG: 40 TABLET, DELAYED RELEASE ORAL at 08:05

## 2025-04-16 RX ADMIN — SENNOSIDES, DOCUSATE SODIUM 2 TABLET: 50; 8.6 TABLET, FILM COATED ORAL at 10:17

## 2025-04-16 RX ADMIN — OXYCODONE 10 MG: 5 TABLET ORAL at 20:13

## 2025-04-16 RX ADMIN — SODIUM CHLORIDE, PRESERVATIVE FREE 10 ML: 5 INJECTION INTRAVENOUS at 20:14

## 2025-04-16 RX ADMIN — DEXAMETHASONE 4 MG: 4 TABLET ORAL at 08:05

## 2025-04-16 RX ADMIN — DIPHENHYDRAMINE HYDROCHLORIDE 25 MG: 25 TABLET ORAL at 20:14

## 2025-04-16 RX ADMIN — LEVETIRACETAM 500 MG: 500 TABLET, FILM COATED ORAL at 08:05

## 2025-04-16 ASSESSMENT — PAIN DESCRIPTION - LOCATION
LOCATION: HEAD

## 2025-04-16 ASSESSMENT — PAIN DESCRIPTION - PAIN TYPE: TYPE: SURGICAL PAIN

## 2025-04-16 ASSESSMENT — PAIN SCALES - WONG BAKER: WONGBAKER_NUMERICALRESPONSE: HURTS A LITTLE BIT

## 2025-04-16 ASSESSMENT — PAIN DESCRIPTION - FREQUENCY: FREQUENCY: CONTINUOUS

## 2025-04-16 ASSESSMENT — PAIN DESCRIPTION - ORIENTATION: ORIENTATION: LEFT

## 2025-04-16 ASSESSMENT — PAIN SCALES - GENERAL
PAINLEVEL_OUTOF10: 8
PAINLEVEL_OUTOF10: 0
PAINLEVEL_OUTOF10: 10
PAINLEVEL_OUTOF10: 8

## 2025-04-16 ASSESSMENT — PAIN DESCRIPTION - DESCRIPTORS: DESCRIPTORS: ACHING

## 2025-04-16 ASSESSMENT — PAIN DESCRIPTION - ONSET: ONSET: ON-GOING

## 2025-04-16 NOTE — CARE COORDINATION
Case Management   Daily Progress Note       Patient Name: Jack Gómez                   YOB: 1985  Diagnosis: Intracranial hemorrhage (HCC) [I62.9]  Intracerebral hemorrhage, nontraumatic (HCC) [I61.9]  Altered mental status, unspecified altered mental status type [R41.82]                       GMLOS: 6.5 days  Length of Stay: 6  days    Anticipated Discharge Date: Two or more days before discharge    Readmission Risk (Low < 19, Mod (19-27), High > 27): Readmission Risk Score: 9.7        Current Transitional Plan    [] Home Independently    [] Home with HC    [] Skilled Nursing Facility    [x] Acute Rehabilitation    [] Long Term Acute Care (LTAC)    [] Other:     Plan for the Stay (Medical Management) : DSA yesterday was unremarkable.          Workflow Continuation (Additional Notes) : Plan is [facility]-accepted. Phone call from Evelia with [facility]. Will start precert tomorrow as it usually comes through the same day with [facility].        Lenora Tony RN  April 16, 2025

## 2025-04-16 NOTE — PROGRESS NOTES
Endovascular Neurosurgery Progress Note       Reason for evaluation: L frontotemporal IPH    SUBJECTIVE:   No acute events from neuroendovascular perspective.  Status post transradial diagnostic cerebral angiogram with no convincing evidence of AVM, dural AV fistula, cerebral aneurysms or significant tumor blush.    History of Chief Complaint:      Patient is 40 YO F with Hx of HTN, previously on Estradiol who presented on 4/10 as a critical stroke alert. Patient is , noted to develop sudden onset right sided weakness, and aphasia as well as slurred speech. On arrival patient was taken to the CT scanner, found to have posterior temporal lobe hemorrhage with LR midline shift.     Patient on exam initially improved and then worsened post MRI brain which showed possible brain mass versus venous infarct. MRI brain showed large FT IPH with associated edema and 11 mm midline shift. Non occlusive thrombus in the left Transverse sinus and left IJ vein were noted. Most MRI patient had a non reactive dilated left pupil, for which she was given 23.4% bolus as well as mannitol and then taken for emergent evacuation of hematoma.   Intra-op pathology frozen section reported a possible glioma.     Postop CTH showed evacuation of left side IPH with decreased mass effect.   Patient on 4/11 was extubated and remained with aphasia as well as right hemianopia. She does have right lower>upper extremity weakness.    NEV consulted for diagnostic angiogram to assess underlying vasculature.       Allergies  is allergic to latex and reglan [metoclopramide].  Medications  Prior to Admission medications    Medication Sig Start Date End Date Taking? Authorizing Provider   ibuprofen (ADVIL;MOTRIN) 600 MG tablet Take 1 tablet by mouth every 6 hours as needed for Pain 7/1/22   Nadya Downing DO   pantoprazole (PROTONIX) 20 MG tablet Take 2 tablets by mouth daily 11/3/21   Diego Machado MD   citalopram (CELEXA) 10 MG

## 2025-04-16 NOTE — PLAN OF CARE
Problem: Discharge Planning  Goal: Discharge to home or other facility with appropriate resources  Outcome: Progressing     Problem: Respiratory - Adult  Goal: Achieves optimal ventilation and oxygenation  Outcome: Progressing  Flowsheets (Taken 4/15/2025 2000)  Achieves optimal ventilation and oxygenation:   Position to facilitate oxygenation and minimize respiratory effort   Assess for changes in respiratory status   Assess for changes in mentation and behavior     Problem: Safety - Adult  Goal: Free from fall injury  Outcome: Progressing  Flowsheets (Taken 4/15/2025 1923)  Free From Fall Injury: Instruct family/caregiver on patient safety     Problem: Neurosensory - Adult  Goal: Achieves stable or improved neurological status  Outcome: Progressing  Flowsheets (Taken 4/15/2025 2000)  Achieves stable or improved neurological status:   Assess for and report changes in neurological status   Initiate measures to prevent increased intracranial pressure   Maintain blood pressure and fluid volume within ordered parameters to optimize cerebral perfusion and minimize risk of hemorrhage   Monitor temperature, glucose, and sodium. Initiate appropriate interventions as ordered  Goal: Absence of seizures  Outcome: Progressing  Flowsheets (Taken 4/15/2025 2000)  Absence of seizures:   Monitor for seizure activity.  If seizure occurs, document type and location of movements and any associated apnea   If seizure occurs, turn head to side and suction secretions as needed   Administer anticonvulsants as ordered  Goal: Remains free of injury related to seizures activity  Outcome: Progressing  Flowsheets (Taken 4/15/2025 2000)  Remains free of injury related to seizure activity:   Maintain airway, patient safety  and administer oxygen as ordered   Monitor patient for seizure activity, document and report duration and description of seizure to Licensed Independent Practitioner   If seizure occurs, turn patient to side and suction

## 2025-04-16 NOTE — H&P
[facility]rology   IN-PATIENT SERVICE   [facility]    HISTORY AND PHYSICAL EXAMINATION            Date:   4/16/2025  Patient name:  Jack Gómez  Date of admission:  4/10/2025  7:28 AM  MRN:   [id number]  Account:  [id number]  YOB: 1985  PCP:    Debbie Villareal MD  Room:   [address] [place] [address]  Code Status:    Full Code    Chief Complaint:     Chief Complaint   Patient presents with    Cerebrovascular Accident       History Obtained From:     family, electronic medical record    History of Present Illness:     38 YO F with Hx of HTN, previously on Estradiol who presented on 4/10 as a critical stroke alert. Patient is , noted to develop sudden onset right sided weakness, and aphasia as well as slurred speech. On arrival patient was taken to the CT scanner, found to have posterior temporal lobe hemorrhage with LR midline shift.      Patient on exam initially improved and then worsened post MRI brain which showed possible brain mass versus venous infarct. MRI brain showed large FT IPH with associated edema and 11 mm midline shift. Non occlusive thrombus in the left Transverse sinus and left IJ vein were noted. Post MRI patient had a non reactive dilated left pupil, for which she was given 23.4% bolus as well as mannitol and then taken for emergent evacuation of hematoma. Intra-op pathology frozen section reported a possible glioma.      Postop CTH showed evacuation of left side IPH with decreased mass effect.   Patient was extubated on 4/11 and subgaleal drain removed on 4/12.  Neuroendovascular was consulted for DSA to evaluate for AVM and aVF .  The patient underwent DSA on 4/15 and there was no convincing evidence of AVM, dural AV fistula, cerebral erythema or significant tumor blush.  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 neurosurgery repeated CT head today morning and if stable without any hemorrhage.  Patient transferred to stepdown

## 2025-04-16 NOTE — PLAN OF CARE
Plan of care    CT head reviewed with angeline Alaniz to start heparin gtt with neuro dosing starting tomorrow. Once therapeutic, repeat CT head.

## 2025-04-16 NOTE — PROGRESS NOTES
Daily Progress Note  Neuro Critical Care    Patient Name: Jack Gómez  Patient : 1985  Room/Bed: 0130/0130-01  Code Status: Full  Allergies:   Allergies   Allergen Reactions    Latex Itching    Reglan [Metoclopramide] Anxiety       CHIEF COMPLAINT:      Aphasia      INTERVAL HISTORY    Initial Presentation (Admitted 4/10/25):  The patient is a 38 yo female with past medical history of HTN and previous hysterectomy  (secondary to endometriosis) previously on estradiol who presented to ED as a critical stroke alert.  Patient is a  and was trying to get ready for work when she had sudden onset slurring of speech, aphasia and she went flaccid and fell down.  She was brought to ED via EMS.  On arrival to ED, she had GCS of 9, E2, V3 M4 and was not following any commands.  She was immediately taken to the scanner and was found to have left posterior temporal lobe hemorrhage with left-to-right midline shift of 8 mm with partial effacement of suprasellar cistern.  She does not take any blood thinners at home.  Post scanner, her exam improved and she started having spontaneous movement of the left side and right lower extremity.  SBP at the time of presentation was 170.  She was started on Cardene drip and mannitol 1 g/kg bolus was administered.  Neurosurgery and Neuro Critical care were consulted.  On exam in the ED, patient opens eyes to voice.  She is aphasic; not able to answer questions appropriately or follow commands.  Moving the left upper and lower extremity purposefully.  Able to antigravity in the right upper extremity.  Not able to antigravity in the right lower extremity.  MRI Brain with/without contrast and MRV Head ordered STAT due to concerns for possible brain mass versus venous infarct.  Patient was intubated for airway protection prior to MRI.  MRI Brain with/without contrast showed large left frontotemporal IPH with associated edema and midline shift (1.1cm), scattered

## 2025-04-16 NOTE — PROGRESS NOTES
Physical Therapy  Facility/Department: [address] NEURO ICU   Physical Therapy Daily Treatment Note    Patient Name: Jack Gómez        MRN: [id number]    : 1985    Date of Service: 2025    Chief Complaint   Patient presents with    Cerebrovascular Accident     Past Medical History:  has a past medical history of Anxiety, GERD (gastroesophageal reflux disease), Nausea, PONV (postoperative nausea and vomiting), and Ulcer of gastric fundus.  Past Surgical History:  has a past surgical history that includes Tubal ligation (Bilateral, ); Ovary removal (Left); Upper gastrointestinal endoscopy (12/2/15); Hysterectomy; Upper gastrointestinal endoscopy (N/A, 2021); Cosmetic surgery; Cleft lip repair; Upper gastrointestinal endoscopy (N/A, 8/3/2021); and craniotomy (Left, 4/10/2025).    Discharge Recommendations  Discharge Recommendations: Patient able to tolerate 3 hours of therapy per day  PT Equipment Recommendations  Equipment Needed: Yes (Pt currently requires RW and skilled assistance for functional mobility)  Mobility Devices: Walker  Walker: Rolling    Assessment  Body Structures, Functions, Activity Limitations Requiring Skilled Therapeutic Intervention: Decreased functional mobility ;Decreased ADL status;Decreased body mechanics;Decreased strength;Decreased high-level IADLs;Decreased balance;Decreased endurance;Decreased safe awareness;Decreased coordination;Decreased posture;Decreased cognition  Assessment: Pt amb 40ft x2 with RW Irais. Pt unsteady throughout with decreased safety awareness, no major LOB noted. At baseline, pt ambulates independently without AD. Pt would benefit from continued skilled therapy to address deficits in safety, endurance, and strength.  Therapy Prognosis: Good  Requires PT Follow-Up: Yes  Activity Tolerance  Activity Tolerance: Patient limited by fatigue;Patient limited by endurance;Treatment limited secondary to decreased cognition  Safety Devices  Type of

## 2025-04-16 NOTE — PROGRESS NOTES
Speech Language Pathology  [facility]    Speech Language Treatment Note    Date: 4/16/2025  Patient’s Name: Jack Gómze  MRN: [id number]  Diagnosis:   Patient Active Problem List   Diagnosis Code    High-risk pregnancy O09.90    Epigastric pain R10.13    Nausea R11.0    Bloating R14.0    Generalized abdominal pain R10.84    Right upper quadrant pain R10.11    Abdominal pain, right upper quadrant R10.11    Other irritable bowel syndrome K58.8    Anxiety F41.9    Abdominal pain R10.9    Intracerebral hemorrhage, nontraumatic (HCC) I61.9    Intracranial hemorrhage (HCC) I62.9    Altered mental status R41.82    Cerebral edema (HCC) G93.6    [place] coma scale total score 3-8 (HCC) R40.2430    Uncal herniation (HCC) G93.5    Midline shift of brain due to hematoma (HCC) S06.2XAA, S06.A0XA       Pain: 0/10    Speech and Language Treatment  Treatment time: 3887-5073    Subjective: [x] Alert [x] Cooperative     [] Confused     [] Agitated    [] Lethargic      Objective/Assessment:  Auditory Comprehension: Yes/no simple/bio: 3/5 increased to 5/5 with repetition/mod visual cues    Wh- questions simple: 3/6 increased to 6/6 with phonemic cues, verbal and visual cues    Stating bio/orientation info: 1/4 increased to 2/4 with max verbal/phonemic cues    1 step commands: 3/6 increased to 6/6 with mod-max verbal, visual, phonemic cues    Verbal Expression: Pt initiating fluent, spontaneous appropriate sentences and questions with occasional word finding difficulty, however receptive difficulty impacts conversation.    Automatic speech: 5/7 increased to 7/7 with phonemic cues    Reviewed education provided re compensatory strategies for aphasia (gestures/visuals, circumlocution, phonemic cues). Pt and family verbalized understanding.    Speech: WFL    Other: Pt alert and engaged throughout. Pt demonstrated improved expressive and receptive language in conversation, as well as spontaneous repetition and

## 2025-04-16 NOTE — PROGRESS NOTES
Neurosurgery [facility]/Resident    Daily Progress Note   Chief Complaint   Patient presents with    Cerebrovascular Accident     4/16/2025  12:25 PM    Chart reviewed.  No acute events overnight.  No new complaints.    Vitals:    04/16/25 0700 04/16/25 0800 04/16/25 0900 04/16/25 1100   BP: 132/84 116/86 111/65 (!) 142/80   Pulse: 75 77 72 93   Resp: 16 13 14 17   Temp:  97.7 °F (36.5 °C)     TempSrc:  Oral     SpO2: 97% 98% 99% 99%   Weight:       Height:             PE:   AxOx1 aphasia present, but improving. Exam limited due to aphasia, inability to follow commands  Strength intact in all extremities.    Sensation intact    Incision CDI, JADE      Lab Results   Component Value Date    WBC 11.3 04/16/2025    HGB 11.3 (L) 04/16/2025    HCT 35.9 (L) 04/16/2025     04/16/2025    CHOL 155 04/11/2025    TRIG 152 (H) 04/11/2025    HDL 34 (L) 04/11/2025    ALT 16 07/31/2024    AST 19 07/31/2024     04/16/2025    K 3.7 04/16/2025     04/16/2025    CREATININE 0.6 04/16/2025    BUN 19 04/16/2025    CO2 23 04/16/2025    TSH 0.59 04/10/2025    INR 1.1 04/13/2025    LABA1C 5.1 04/11/2025    CRP 4.5 05/07/2021    SEDRATE 12 05/07/2021       Radiology   CT HEAD WO CONTRAST  Result Date: 4/16/2025  EXAMINATION: CT OF THE HEAD WITHOUT CONTRAST  4/16/2025 10:27 am TECHNIQUE: CT of the head was performed without the administration of intravenous contrast. Automated exposure control, iterative reconstruction, and/or weight based adjustment of the mA/kV was utilized to reduce the radiation dose to as low as reasonably achievable. COMPARISON: CT head performed 04/10/2025. HISTORY: ORDERING SYSTEM PROVIDED HISTORY: follow ICH TECHNOLOGIST PROVIDED HISTORY: follow ICH Is the patient pregnant?->No FINDINGS: BRAIN/VENTRICLES: There is postsurgical change involving the left parietal/temporal region with fluid and blood products in the resection cavity.  There is mild residual pneumocephalus.  There is a small amount of adjacent

## 2025-04-17 LAB
ANION GAP SERPL CALCULATED.3IONS-SCNC: 12 MMOL/L (ref 9–16)
BASOPHILS # BLD: 0 K/UL (ref 0–0.2)
BASOPHILS NFR BLD: 0 % (ref 0–2)
BUN SERPL-MCNC: 24 MG/DL (ref 6–20)
CALCIUM SERPL-MCNC: 9 MG/DL (ref 8.6–10.4)
CHLORIDE SERPL-SCNC: 102 MMOL/L (ref 98–107)
CO2 SERPL-SCNC: 23 MMOL/L (ref 20–31)
CREAT SERPL-MCNC: 0.7 MG/DL (ref 0.6–0.9)
EOSINOPHIL # BLD: 0.13 K/UL (ref 0–0.44)
EOSINOPHILS RELATIVE PERCENT: 1 % (ref 1–4)
ERYTHROCYTE [DISTWIDTH] IN BLOOD BY AUTOMATED COUNT: 12.1 % (ref 11.8–14.4)
GFR, ESTIMATED: >90 ML/MIN/1.73M2
GLUCOSE SERPL-MCNC: 104 MG/DL (ref 74–99)
HCT VFR BLD AUTO: 34.7 % (ref 36.3–47.1)
HGB BLD-MCNC: 11.2 G/DL (ref 11.9–15.1)
IMM GRANULOCYTES # BLD AUTO: 0.25 K/UL (ref 0–0.3)
IMM GRANULOCYTES NFR BLD: 2 %
LYMPHOCYTES NFR BLD: 5.72 K/UL (ref 1.1–3.7)
LYMPHOCYTES RELATIVE PERCENT: 45 % (ref 24–43)
MCH RBC QN AUTO: 27.1 PG (ref 25.2–33.5)
MCHC RBC AUTO-ENTMCNC: 32.3 G/DL (ref 28.4–34.8)
MCV RBC AUTO: 84 FL (ref 82.6–102.9)
MONOCYTES NFR BLD: 0.76 K/UL (ref 0.1–1.2)
MONOCYTES NFR BLD: 6 % (ref 3–12)
MORPHOLOGY: NORMAL
NEUTROPHILS NFR BLD: 46 % (ref 36–65)
NEUTS SEG NFR BLD: 5.84 K/UL (ref 1.5–8.1)
NRBC BLD-RTO: 0 PER 100 WBC
PLATELET # BLD AUTO: 311 K/UL (ref 138–453)
PMV BLD AUTO: 10.5 FL (ref 8.1–13.5)
POTASSIUM SERPL-SCNC: 3.5 MMOL/L (ref 3.7–5.3)
RBC # BLD AUTO: 4.13 M/UL (ref 3.95–5.11)
SODIUM SERPL-SCNC: 137 MMOL/L (ref 136–145)
WBC OTHER # BLD: 12.7 K/UL (ref 3.5–11.3)

## 2025-04-17 PROCEDURE — 6360000002 HC RX W HCPCS

## 2025-04-17 PROCEDURE — 2500000003 HC RX 250 WO HCPCS: Performed by: NURSE PRACTITIONER

## 2025-04-17 PROCEDURE — 6370000000 HC RX 637 (ALT 250 FOR IP)

## 2025-04-17 PROCEDURE — 97535 SELF CARE MNGMENT TRAINING: CPT

## 2025-04-17 PROCEDURE — 2060000000 HC ICU INTERMEDIATE R&B

## 2025-04-17 PROCEDURE — 85025 COMPLETE CBC W/AUTO DIFF WBC: CPT

## 2025-04-17 PROCEDURE — 6360000002 HC RX W HCPCS: Performed by: REGISTERED NURSE

## 2025-04-17 PROCEDURE — 80048 BASIC METABOLIC PNL TOTAL CA: CPT

## 2025-04-17 PROCEDURE — 97110 THERAPEUTIC EXERCISES: CPT

## 2025-04-17 PROCEDURE — 6370000000 HC RX 637 (ALT 250 FOR IP): Performed by: NURSE PRACTITIONER

## 2025-04-17 PROCEDURE — 6360000002 HC RX W HCPCS: Performed by: NURSE PRACTITIONER

## 2025-04-17 PROCEDURE — 99232 SBSQ HOSP IP/OBS MODERATE 35: CPT | Performed by: PSYCHIATRY & NEUROLOGY

## 2025-04-17 PROCEDURE — 97530 THERAPEUTIC ACTIVITIES: CPT

## 2025-04-17 PROCEDURE — 2500000003 HC RX 250 WO HCPCS: Performed by: STUDENT IN AN ORGANIZED HEALTH CARE EDUCATION/TRAINING PROGRAM

## 2025-04-17 PROCEDURE — 93005 ELECTROCARDIOGRAM TRACING: CPT

## 2025-04-17 PROCEDURE — 36415 COLL VENOUS BLD VENIPUNCTURE: CPT

## 2025-04-17 RX ORDER — SENNA AND DOCUSATE SODIUM 50; 8.6 MG/1; MG/1
2 TABLET, FILM COATED ORAL 2 TIMES DAILY
Status: DISCONTINUED | OUTPATIENT
Start: 2025-04-17 | End: 2025-04-18 | Stop reason: HOSPADM

## 2025-04-17 RX ORDER — POTASSIUM CHLORIDE 7.45 MG/ML
10 INJECTION INTRAVENOUS
Status: DISCONTINUED | OUTPATIENT
Start: 2025-04-17 | End: 2025-04-17

## 2025-04-17 RX ORDER — HYDROXYZINE HYDROCHLORIDE 25 MG/1
25 TABLET, FILM COATED ORAL EVERY 6 HOURS PRN
Status: DISCONTINUED | OUTPATIENT
Start: 2025-04-17 | End: 2025-04-18 | Stop reason: HOSPADM

## 2025-04-17 RX ORDER — HYDROXYZINE HYDROCHLORIDE 25 MG/1
25 TABLET, FILM COATED ORAL ONCE
Status: COMPLETED | OUTPATIENT
Start: 2025-04-17 | End: 2025-04-17

## 2025-04-17 RX ADMIN — SODIUM CHLORIDE, PRESERVATIVE FREE 10 ML: 5 INJECTION INTRAVENOUS at 21:44

## 2025-04-17 RX ADMIN — ENOXAPARIN SODIUM 40 MG: 100 INJECTION SUBCUTANEOUS at 08:20

## 2025-04-17 RX ADMIN — PANTOPRAZOLE SODIUM 40 MG: 40 TABLET, DELAYED RELEASE ORAL at 08:21

## 2025-04-17 RX ADMIN — LEVETIRACETAM 500 MG: 500 TABLET, FILM COATED ORAL at 21:35

## 2025-04-17 RX ADMIN — OXYCODONE 10 MG: 5 TABLET ORAL at 06:20

## 2025-04-17 RX ADMIN — SODIUM CHLORIDE, PRESERVATIVE FREE 10 ML: 5 INJECTION INTRAVENOUS at 21:35

## 2025-04-17 RX ADMIN — LEVETIRACETAM 500 MG: 500 TABLET, FILM COATED ORAL at 08:21

## 2025-04-17 RX ADMIN — SODIUM CHLORIDE, PRESERVATIVE FREE 10 ML: 5 INJECTION INTRAVENOUS at 07:00

## 2025-04-17 RX ADMIN — DEXAMETHASONE 4 MG: 4 TABLET ORAL at 08:24

## 2025-04-17 RX ADMIN — DIPHENHYDRAMINE HYDROCHLORIDE 25 MG: 25 TABLET ORAL at 06:21

## 2025-04-17 RX ADMIN — SENNOSIDES, DOCUSATE SODIUM 2 TABLET: 50; 8.6 TABLET, FILM COATED ORAL at 08:20

## 2025-04-17 RX ADMIN — OXYCODONE 10 MG: 5 TABLET ORAL at 12:19

## 2025-04-17 RX ADMIN — SENNOSIDES, DOCUSATE SODIUM 2 TABLET: 50; 8.6 TABLET, FILM COATED ORAL at 21:34

## 2025-04-17 RX ADMIN — DIPHENHYDRAMINE HYDROCHLORIDE 25 MG: 25 TABLET ORAL at 12:19

## 2025-04-17 RX ADMIN — HYDROXYZINE HYDROCHLORIDE 25 MG: 25 TABLET, FILM COATED ORAL at 15:01

## 2025-04-17 RX ADMIN — POTASSIUM CHLORIDE 10 MEQ: 7.46 INJECTION, SOLUTION INTRAVENOUS at 08:41

## 2025-04-17 RX ADMIN — OXYCODONE 10 MG: 5 TABLET ORAL at 21:34

## 2025-04-17 RX ADMIN — POTASSIUM BICARBONATE 40 MEQ: 782 TABLET, EFFERVESCENT ORAL at 09:46

## 2025-04-17 RX ADMIN — HYDROXYZINE HYDROCHLORIDE 25 MG: 25 TABLET, FILM COATED ORAL at 21:35

## 2025-04-17 RX ADMIN — AMLODIPINE BESYLATE 5 MG: 5 TABLET ORAL at 08:24

## 2025-04-17 RX ADMIN — ACETAMINOPHEN 650 MG: 325 TABLET ORAL at 08:21

## 2025-04-17 RX ADMIN — MIRTAZAPINE 15 MG: 15 TABLET, FILM COATED ORAL at 21:35

## 2025-04-17 ASSESSMENT — PAIN DESCRIPTION - ORIENTATION
ORIENTATION: LEFT

## 2025-04-17 ASSESSMENT — PAIN SCALES - GENERAL
PAINLEVEL_OUTOF10: 5
PAINLEVEL_OUTOF10: 0
PAINLEVEL_OUTOF10: 5
PAINLEVEL_OUTOF10: 0

## 2025-04-17 ASSESSMENT — PAIN DESCRIPTION - LOCATION
LOCATION: HEAD

## 2025-04-17 ASSESSMENT — PAIN DESCRIPTION - DESCRIPTORS
DESCRIPTORS: THROBBING
DESCRIPTORS: ACHING
DESCRIPTORS: ACHING
DESCRIPTORS: THROBBING

## 2025-04-17 NOTE — PROGRESS NOTES
Physical Therapy  Facility/Department: [address] STEPDOWN   Physical Therapy Daily Treatment Note    Patient Name: Jack Gómez        MRN: [id number]    : 1985    Date of Service: 2025    Chief Complaint   Patient presents with    Cerebrovascular Accident     Past Medical History:  has a past medical history of Anxiety, GERD (gastroesophageal reflux disease), Nausea, PONV (postoperative nausea and vomiting), and Ulcer of gastric fundus.  Past Surgical History:  has a past surgical history that includes Tubal ligation (Bilateral, ); Ovary removal (Left); Upper gastrointestinal endoscopy (12/2/15); Hysterectomy; Upper gastrointestinal endoscopy (N/A, 2021); Cosmetic surgery; Cleft lip repair; Upper gastrointestinal endoscopy (N/A, 8/3/2021); and craniotomy (Left, 4/10/2025).    Discharge Recommendations  Discharge Recommendations: Patient able to tolerate 3 hours of therapy per day  PT Equipment Recommendations  Equipment Needed: Yes  Mobility Devices: Walker  Walker: Rolling  Other: Pt currently requries RW and skilled assistance for functional mobility.    Assessment  Body Structures, Functions, Activity Limitations Requiring Skilled Therapeutic Intervention: Decreased functional mobility ;Decreased ADL status;Decreased body mechanics;Decreased strength;Decreased high-level IADLs;Decreased balance;Decreased endurance;Decreased safe awareness;Decreased coordination;Decreased posture;Decreased cognition  Assessment: Pt amb 45ft x2 with RW CGA. Pt slightly unsteady, impulsively with high knee and large steps with cues for step through gait with good return, no LOB noted. At baseline, pt ambulates independently without AD. Pt would benefit from continued skilled therapy to address deficits in safety, endurance, and strength.  Therapy Prognosis: Good  Requires PT Follow-Up: Yes  Activity Tolerance  Activity Tolerance: Patient limited by fatigue;Patient limited by endurance;Treatment limited

## 2025-04-17 NOTE — PLAN OF CARE
Problem: Discharge Planning  Goal: Discharge to home or other facility with appropriate resources  Outcome: Progressing     Problem: Respiratory - Adult  Goal: Achieves optimal ventilation and oxygenation  Outcome: Progressing     Problem: Safety - Adult  Goal: Free from fall injury  Outcome: Progressing     Problem: Neurosensory - Adult  Goal: Achieves stable or improved neurological status  Outcome: Progressing  Flowsheets (Taken 4/16/2025 2000)  Achieves stable or improved neurological status: Assess for and report changes in neurological status  Goal: Absence of seizures  Outcome: Progressing  Flowsheets (Taken 4/16/2025 2000)  Absence of seizures: Monitor for seizure activity.  If seizure occurs, document type and location of movements and any associated apnea  Goal: Remains free of injury related to seizures activity  Outcome: Progressing  Goal: Achieves maximal functionality and self care  Outcome: Progressing     Problem: Cardiovascular - Adult  Goal: Maintains optimal cardiac output and hemodynamic stability  Outcome: Progressing  Flowsheets (Taken 4/16/2025 2000)  Maintains optimal cardiac output and hemodynamic stability: Monitor blood pressure and heart rate  Goal: Absence of cardiac dysrhythmias or at baseline  Outcome: Progressing  Flowsheets (Taken 4/16/2025 2000)  Absence of cardiac dysrhythmias or at baseline: Monitor cardiac rate and rhythm     Problem: Skin/Tissue Integrity - Adult  Goal: Skin integrity remains intact  Description: 1.  Monitor for areas of redness and/or skin breakdown  2.  Assess vascular access sites hourly  3.  Every 4-6 hours minimum:  Change oxygen saturation probe site  4.  Every 4-6 hours:  If on nasal continuous positive airway pressure, respiratory therapy assess nares and determine need for appliance change or resting period  Outcome: Progressing  Goal: Incisions, wounds, or drain sites healing without S/S of infection  Outcome: Progressing  Goal: Oral mucous membranes

## 2025-04-17 NOTE — PROGRESS NOTES
Comprehensive Nutrition Assessment    Type and Reason for Visit:  Reassess    Nutrition Recommendations/Plan:   Send ONS TID with meals  Encouraged oral intake     Malnutrition Assessment:  Malnutrition Status:  At risk for malnutrition (04/17/25 0937)    Context:  Acute Illness     Findings of the 6 clinical characteristics of malnutrition:  Energy Intake:  50% or less of estimated energy requirements for 5 or more days  Weight Loss:  Unable to assess     Body Fat Loss:  No body fat loss     Muscle Mass Loss:  No muscle mass loss    Fluid Accumulation:  No fluid accumulation     Strength:  Not Performed    Nutrition Assessment:    Pt now extubated and on Regular diet.  Nursing documentation shows poor intakes with only 0-25% of most meals consumed.  Spoke with Pt and sister who confirm poor intakes.  Pt does contradict herself at times, though sister confirms that she is eating only bites of most meals.  Patient reports this is because she dislikes the food here, but sister states Pt eats only bites of outside food, as well.  Encouraged oral intake to maintain strength.  Patient was receiving Ensure Plus with meals, though this was discontinued as Pt did not like or drink them, with sister reporting she only likes the vanilla.  Pt and sister agree to try Ensure Clear and Magic cup with meals for increased kcal and protein intake.  Pt would also like Italian ice with meals as she very much likes this.  Will send variety of ONS with meals.  Encouraged Pt to communicate her preferences with regards to type and flavor of ONS she likes.  Of note, Pt is on Remeron 15 mg which may help to stimulate appetite and intake.  Pt still with no measured weight to assess.    Nutrition Related Findings:    Meds/Labs reviewed.  HgbA1c 5.1. Wound Type: None       Current Nutrition Intake & Therapies:    Average Meal Intake: 1-25%  Average Supplements Intake: None Ordered  ADULT DIET; Regular    Anthropometric Measures:  Height:

## 2025-04-17 NOTE — PROGRESS NOTES
Occupational Therapy  Occupational Therapy Daily Treatment Note  Facility/Department: [address] STEPDOWN   Patient Name: Jack Gómez        MRN: [id number]    : 1985    Date of Service: 2025    Chief Complaint   Patient presents with    Cerebrovascular Accident     Past Medical History:  has a past medical history of Anxiety, GERD (gastroesophageal reflux disease), Nausea, PONV (postoperative nausea and vomiting), and Ulcer of gastric fundus.  Past Surgical History:  has a past surgical history that includes Tubal ligation (Bilateral, ); Ovary removal (Left); Upper gastrointestinal endoscopy (12/2/15); Hysterectomy; Upper gastrointestinal endoscopy (N/A, 2021); Cosmetic surgery; Cleft lip repair; Upper gastrointestinal endoscopy (N/A, 8/3/2021); and craniotomy (Left, 4/10/2025).    Discharge Recommendations  Discharge Recommendations: Patient would benefit from continued therapy after discharge Further therapy recommended at discharge.The patient should be able to tolerate at least 3 hours of therapy per day over 5 days or 15 hours over 7 days.   This patient may benefit from a Physical Medicine and Rehab consult.    Assessment  Performance deficits / Impairments: Decreased functional mobility ;Decreased ADL status;Decreased strength;Decreased safe awareness;Decreased cognition;Decreased endurance;Decreased balance;Decreased high-level IADLs;Decreased coordination  Prognosis: Good  Activity Tolerance  Activity Tolerance: Patient Tolerated treatment well;Treatment limited secondary to decreased cognition  Safety Devices  Type of Devices: Call light within reach;Bed alarm in place;Gait belt;Nurse notified;Left in bed  Restraints  Restraints Initially in Place: No    AM-PAC  AM-PAC Daily Activity - Inpatient   How much help is needed for putting on and taking off regular lower body clothing?: A Little  How much help is needed for bathing (which includes washing, rinsing, drying)?: A Lot  How

## 2025-04-17 NOTE — CARE COORDINATION
Case Management   Daily Progress Note       Patient Name: Jack Gómez                   YOB: 1985  Diagnosis: Intracranial hemorrhage (HCC) [I62.9]  Intracerebral hemorrhage, nontraumatic (HCC) [I61.9]  Altered mental status, unspecified altered mental status type [R41.82]                       GMLOS: 6.5 days  Length of Stay: 7  days    Anticipated Discharge Date: Two or more days before discharge    Readmission Risk (Low < 19, Mod (19-27), High > 27): Readmission Risk Score: 10        Current Transitional Plan    [] Home Independently    [] Home with HC    [] Skilled Nursing Facility    [x] Acute Rehabilitation    [] Long Term Acute Care (LTAC)    [] Other:     Plan for the Stay (Medical Management) :    Heparin gtt       Workflow Continuation (Additional Notes) :    [facility] able to accept for ARU services. Discussed starting precert for the patient tomorrow and expect to be able to have approval. Able to accept over the weekend if approved. Will need updated PT/OT notes for precert.     Felix Kerr  April 17, 2025

## 2025-04-17 NOTE — PROGRESS NOTES
[facility] Neurology   IN-PATIENT SERVICE   [facility]    Progress Note             Date:   4/17/2025  Patient name:  Jack Gómez  Date of admission:  4/10/2025  7:28 AM  MRN:   [id number]  Account:  [id number]  YOB: 1985  PCP:    Debbie Villareal MD  Room:   [address] [place] [address]  Code Status:    Full Code    Chief Complaint:     Chief Complaint   Patient presents with    Cerebrovascular Accident       Interval hx:     The patient was seen and examined at bedside.   Is vitally stable, alert and oriented x 3.   No acute events overnight.  Neuro exam: Mild paraphasic errors, right upper and right lower extremity 4/5  Potassium 3.5, replaced  Planned for [facility] ARU,  working on it      Brief History of Present Illness:       40 YO F with Hx of HTN, previously on Estradiol who presented on 4/10 as a critical stroke alert. Patient is , noted to develop sudden onset right sided weakness, and aphasia as well as slurred speech. On arrival patient was taken to the CT scanner, found to have posterior temporal lobe hemorrhage with LR midline shift.      Patient on exam initially improved and then worsened post MRI brain which showed possible brain mass versus venous infarct. MRI brain showed large FT IPH with associated edema and 11 mm midline shift. Non occlusive thrombus in the left Transverse sinus and left IJ vein were noted. Post MRI patient had a non reactive dilated left pupil, for which she was given 23.4% bolus as well as mannitol and then taken for emergent evacuation of hematoma. Intra-op pathology frozen section reported a possible glioma.      Postop CTH showed evacuation of left side IPH with decreased mass effect.   Patient was extubated on 4/11 and subgaleal drain removed on 4/12.  Neuroendovascular was consulted for DSA to evaluate for AVM and aVF .  The patient underwent DSA on 4/15 and there was no convincing evidence of AVM, dural AV fistula,

## 2025-04-17 NOTE — PLAN OF CARE
Problem: Discharge Planning  Goal: Discharge to home or other facility with appropriate resources  4/17/2025 1819 by Maximino Dale RN  Outcome: Progressing  Flowsheets (Taken 4/17/2025 0800)  Discharge to home or other facility with appropriate resources: Identify barriers to discharge with patient and caregiver  4/17/2025 0629 by Lulu Girard RN  Outcome: Progressing     Problem: Respiratory - Adult  Goal: Achieves optimal ventilation and oxygenation  4/17/2025 1819 by Maximino Dale RN  Outcome: Progressing  Flowsheets (Taken 4/17/2025 0800)  Achieves optimal ventilation and oxygenation: Assess for changes in respiratory status  4/17/2025 0629 by Lulu Girard RN  Outcome: Progressing     Problem: Safety - Adult  Goal: Free from fall injury  4/17/2025 1819 by Maximino Dale RN  Outcome: Progressing  Flowsheets (Taken 4/17/2025 0800)  Free From Fall Injury: Instruct family/caregiver on patient safety  4/17/2025 0629 by Lulu Girard RN  Outcome: Progressing     Problem: Neurosensory - Adult  Goal: Achieves stable or improved neurological status  4/17/2025 1819 by Maximino Dale RN  Outcome: Progressing  Flowsheets (Taken 4/17/2025 0800)  Achieves stable or improved neurological status: Assess for and report changes in neurological status  4/17/2025 0629 by Lulu Girard RN  Outcome: Progressing  Flowsheets (Taken 4/16/2025 2000)  Achieves stable or improved neurological status: Assess for and report changes in neurological status  Goal: Absence of seizures  4/17/2025 1819 by Maximino Dale RN  Outcome: Progressing  Flowsheets (Taken 4/17/2025 0800)  Absence of seizures: Monitor for seizure activity.  If seizure occurs, document type and location of movements and any associated apnea  4/17/2025 0629 by Lulu Girard RN  Outcome: Progressing  Flowsheets (Taken 4/16/2025 2000)  Absence of seizures: Monitor for seizure activity.  If seizure occurs, document type and location of

## 2025-04-17 NOTE — PROGRESS NOTES
Neurosurgery [facility]/Resident    Daily Progress Note   Chief Complaint   Patient presents with    Cerebrovascular Accident     4/17/2025  11:06 AM    Chart reviewed.  No acute events overnight.  No new complaints.    Vitals:    04/17/25 0813 04/17/25 0931 04/17/25 0950 04/17/25 0951   BP: 112/72      Pulse: 73  (!) 117 (!) 128   Resp: 12  16 19   Temp: 97.4 °F (36.3 °C)      TempSrc: Axillary      SpO2: 97%  100% 100%   Weight:       Height:  1.702 m (5' 7.01\")           PE:   AxOx1 aphasia present, but improving. Exam limited due to global aphasia, inability to follow commands  Strength intact in all extremities.     Sensation intact    Incision JADE       Lab Results   Component Value Date    WBC 12.7 (H) 04/17/2025    HGB 11.2 (L) 04/17/2025    HCT 34.7 (L) 04/17/2025     04/17/2025    CHOL 155 04/11/2025    TRIG 152 (H) 04/11/2025    HDL 34 (L) 04/11/2025    ALT 16 07/31/2024    AST 19 07/31/2024     04/17/2025    K 3.5 (L) 04/17/2025     04/17/2025    CREATININE 0.7 04/17/2025    BUN 24 (H) 04/17/2025    CO2 23 04/17/2025    TSH 0.59 04/10/2025    INR 1.1 04/13/2025    LABA1C 5.1 04/11/2025    CRP 4.5 05/07/2021    SEDRATE 12 05/07/2021       Radiology   IR ANGIOGRAM CAROTID C EREBRAL BILATERAL  Result Date: 4/16/2025  Date of Service: 4/15/2025 Procedure: Diagnostic cerebral angiogram Patient arrived and wheeled in to the angio suite at: 1343 Monitoring and administration of sedation started at: 1343 Puncture obtained at: 1358 Vascular access was removed at: 1530 Manual pressure for minutes: TR Band Sedation ended at: 1544 Patient wheeled out of the angio suite at: 1544 Diagnosis: left temporal IPH s/p L temporal crani w/ c/f mass Procedures: --Right ultrasound guided radial artery access --Intravenous moderate sedation --Selective right common carotid artery (CCA) angiogram --Selective right internal carotid artery (ICA) cerebral angiogram --Selective right external carotid artery (ECA)

## 2025-04-18 VITALS
OXYGEN SATURATION: 94 % | WEIGHT: 160 LBS | SYSTOLIC BLOOD PRESSURE: 108 MMHG | DIASTOLIC BLOOD PRESSURE: 68 MMHG | HEART RATE: 98 BPM | BODY MASS INDEX: 25.11 KG/M2 | HEIGHT: 67 IN | RESPIRATION RATE: 19 BRPM | TEMPERATURE: 98 F

## 2025-04-18 PROBLEM — C71.9 GLIOMA (HCC): Status: ACTIVE | Noted: 2025-04-18

## 2025-04-18 LAB
ANION GAP SERPL CALCULATED.3IONS-SCNC: 10 MMOL/L (ref 9–16)
BASOPHILS # BLD: 0 K/UL (ref 0–0.2)
BASOPHILS NFR BLD: 0 % (ref 0–2)
BUN SERPL-MCNC: 22 MG/DL (ref 6–20)
CALCIUM SERPL-MCNC: 9 MG/DL (ref 8.6–10.4)
CHLORIDE SERPL-SCNC: 101 MMOL/L (ref 98–107)
CO2 SERPL-SCNC: 24 MMOL/L (ref 20–31)
CREAT SERPL-MCNC: 0.7 MG/DL (ref 0.6–0.9)
EKG ATRIAL RATE: 98 BPM
EKG P AXIS: 58 DEGREES
EKG P-R INTERVAL: 124 MS
EKG Q-T INTERVAL: 300 MS
EKG QRS DURATION: 74 MS
EKG QTC CALCULATION (BAZETT): 383 MS
EKG R AXIS: 43 DEGREES
EKG T AXIS: 68 DEGREES
EKG VENTRICULAR RATE: 98 BPM
EOSINOPHIL # BLD: 0.13 K/UL (ref 0–0.44)
EOSINOPHILS RELATIVE PERCENT: 1 % (ref 1–4)
ERYTHROCYTE [DISTWIDTH] IN BLOOD BY AUTOMATED COUNT: 12.4 % (ref 11.8–14.4)
F2 C.20210G>A GENO BLD/T: NEGATIVE
GFR, ESTIMATED: >90 ML/MIN/1.73M2
GLUCOSE SERPL-MCNC: 106 MG/DL (ref 74–99)
HCT VFR BLD AUTO: 32.7 % (ref 36.3–47.1)
HGB BLD-MCNC: 10.6 G/DL (ref 11.9–15.1)
IMM GRANULOCYTES # BLD AUTO: 0.25 K/UL (ref 0–0.3)
IMM GRANULOCYTES NFR BLD: 2 %
LYMPHOCYTES NFR BLD: 5.72 K/UL (ref 1.1–3.7)
LYMPHOCYTES RELATIVE PERCENT: 45 % (ref 24–43)
MCH RBC QN AUTO: 27.5 PG (ref 25.2–33.5)
MCHC RBC AUTO-ENTMCNC: 32.4 G/DL (ref 28.4–34.8)
MCV RBC AUTO: 84.7 FL (ref 82.6–102.9)
MONOCYTES NFR BLD: 0.76 K/UL (ref 0.1–1.2)
MONOCYTES NFR BLD: 6 % (ref 3–12)
MORPHOLOGY: NORMAL
NEUTROPHILS NFR BLD: 46 % (ref 36–65)
NEUTS SEG NFR BLD: 5.84 K/UL (ref 1.5–8.1)
NRBC BLD-RTO: 0 PER 100 WBC
PLATELET # BLD AUTO: 333 K/UL (ref 138–453)
PMV BLD AUTO: 10.7 FL (ref 8.1–13.5)
POTASSIUM SERPL-SCNC: 3.6 MMOL/L (ref 3.7–5.3)
RBC # BLD AUTO: 3.86 M/UL (ref 3.95–5.11)
SODIUM SERPL-SCNC: 135 MMOL/L (ref 136–145)
SPECIMEN SOURCE: NORMAL
WBC OTHER # BLD: 12.7 K/UL (ref 3.5–11.3)

## 2025-04-18 PROCEDURE — 6360000002 HC RX W HCPCS: Performed by: NURSE PRACTITIONER

## 2025-04-18 PROCEDURE — 2500000003 HC RX 250 WO HCPCS: Performed by: STUDENT IN AN ORGANIZED HEALTH CARE EDUCATION/TRAINING PROGRAM

## 2025-04-18 PROCEDURE — 6370000000 HC RX 637 (ALT 250 FOR IP)

## 2025-04-18 PROCEDURE — 99233 SBSQ HOSP IP/OBS HIGH 50: CPT | Performed by: PSYCHIATRY & NEUROLOGY

## 2025-04-18 PROCEDURE — 85025 COMPLETE CBC W/AUTO DIFF WBC: CPT

## 2025-04-18 PROCEDURE — 2580000003 HC RX 258

## 2025-04-18 PROCEDURE — 6360000002 HC RX W HCPCS: Performed by: REGISTERED NURSE

## 2025-04-18 PROCEDURE — 80048 BASIC METABOLIC PNL TOTAL CA: CPT

## 2025-04-18 PROCEDURE — 6370000000 HC RX 637 (ALT 250 FOR IP): Performed by: NURSE PRACTITIONER

## 2025-04-18 PROCEDURE — 36415 COLL VENOUS BLD VENIPUNCTURE: CPT

## 2025-04-18 PROCEDURE — 97116 GAIT TRAINING THERAPY: CPT

## 2025-04-18 PROCEDURE — 92507 TX SP LANG VOICE COMM INDIV: CPT

## 2025-04-18 PROCEDURE — 97110 THERAPEUTIC EXERCISES: CPT

## 2025-04-18 RX ORDER — MIRTAZAPINE 15 MG/1
15 TABLET, FILM COATED ORAL NIGHTLY
Qty: 30 TABLET | Refills: 3 | Status: SHIPPED | OUTPATIENT
Start: 2025-04-18

## 2025-04-18 RX ORDER — DEXAMETHASONE 2 MG/1
TABLET ORAL
Qty: 10 TABLET | Refills: 0 | Status: SHIPPED | OUTPATIENT
Start: 2025-04-19 | End: 2025-04-29

## 2025-04-18 RX ORDER — AMLODIPINE BESYLATE 5 MG/1
5 TABLET ORAL DAILY
Qty: 30 TABLET | Refills: 3 | Status: SHIPPED | OUTPATIENT
Start: 2025-04-19

## 2025-04-18 RX ORDER — SODIUM CHLORIDE 9 MG/ML
INJECTION, SOLUTION INTRAVENOUS CONTINUOUS
Status: DISCONTINUED | OUTPATIENT
Start: 2025-04-18 | End: 2025-04-18 | Stop reason: HOSPADM

## 2025-04-18 RX ORDER — HYDROXYZINE HYDROCHLORIDE 25 MG/1
25 TABLET, FILM COATED ORAL ONCE
Status: COMPLETED | OUTPATIENT
Start: 2025-04-18 | End: 2025-04-18

## 2025-04-18 RX ORDER — LEVETIRACETAM 500 MG/1
500 TABLET ORAL 2 TIMES DAILY
Qty: 60 TABLET | Refills: 3 | Status: SHIPPED | OUTPATIENT
Start: 2025-04-18

## 2025-04-18 RX ADMIN — SENNOSIDES, DOCUSATE SODIUM 2 TABLET: 50; 8.6 TABLET, FILM COATED ORAL at 08:56

## 2025-04-18 RX ADMIN — AMLODIPINE BESYLATE 5 MG: 5 TABLET ORAL at 08:56

## 2025-04-18 RX ADMIN — DEXAMETHASONE 4 MG: 4 TABLET ORAL at 08:56

## 2025-04-18 RX ADMIN — LEVETIRACETAM 500 MG: 500 TABLET, FILM COATED ORAL at 08:55

## 2025-04-18 RX ADMIN — SODIUM CHLORIDE: 0.9 INJECTION, SOLUTION INTRAVENOUS at 09:11

## 2025-04-18 RX ADMIN — SODIUM CHLORIDE, PRESERVATIVE FREE 10 ML: 5 INJECTION INTRAVENOUS at 08:57

## 2025-04-18 RX ADMIN — HYDROXYZINE HYDROCHLORIDE 25 MG: 25 TABLET, FILM COATED ORAL at 11:17

## 2025-04-18 RX ADMIN — OXYCODONE 10 MG: 5 TABLET ORAL at 14:09

## 2025-04-18 RX ADMIN — POTASSIUM BICARBONATE 20 MEQ: 782 TABLET, EFFERVESCENT ORAL at 08:56

## 2025-04-18 RX ADMIN — PANTOPRAZOLE SODIUM 40 MG: 40 TABLET, DELAYED RELEASE ORAL at 08:56

## 2025-04-18 RX ADMIN — ENOXAPARIN SODIUM 40 MG: 100 INJECTION SUBCUTANEOUS at 08:56

## 2025-04-18 RX ADMIN — HYDROXYZINE HYDROCHLORIDE 25 MG: 25 TABLET, FILM COATED ORAL at 08:56

## 2025-04-18 ASSESSMENT — PAIN DESCRIPTION - DESCRIPTORS
DESCRIPTORS: THROBBING
DESCRIPTORS: DISCOMFORT

## 2025-04-18 ASSESSMENT — PAIN DESCRIPTION - LOCATION
LOCATION: HEAD
LOCATION: HEAD

## 2025-04-18 ASSESSMENT — PAIN SCALES - GENERAL
PAINLEVEL_OUTOF10: 3
PAINLEVEL_OUTOF10: 10

## 2025-04-18 ASSESSMENT — PAIN DESCRIPTION - ORIENTATION: ORIENTATION: LEFT

## 2025-04-18 NOTE — PROGRESS NOTES
Physical Therapy  Facility/Department: [address] STEPDOWN   Physical Therapy Daily Treatment Note    Patient Name: Jack Gómez        MRN: [id number]    : 1985    Date of Service: 2025    Chief Complaint   Patient presents with    Cerebrovascular Accident     Past Medical History:  has a past medical history of Anxiety, GERD (gastroesophageal reflux disease), Nausea, PONV (postoperative nausea and vomiting), and Ulcer of gastric fundus.  Past Surgical History:  has a past surgical history that includes Tubal ligation (Bilateral, ); Ovary removal (Left); Upper gastrointestinal endoscopy (12/2/15); Hysterectomy; Upper gastrointestinal endoscopy (N/A, 2021); Cosmetic surgery; Cleft lip repair; Upper gastrointestinal endoscopy (N/A, 8/3/2021); and craniotomy (Left, 4/10/2025).    Discharge Recommendations  Discharge Recommendations: Patient able to tolerate 3 hours of therapy per day  PT Equipment Recommendations  Equipment Needed: Yes  Mobility Devices: Walker  Walker: Rolling  Other: Pt currently requries RW and skilled assistance for functional mobility.    Assessment  Body Structures, Functions, Activity Limitations Requiring Skilled Therapeutic Intervention: Decreased functional mobility ;Decreased ADL status;Decreased body mechanics;Decreased strength;Decreased high-level IADLs;Decreased balance;Decreased endurance;Decreased safe awareness;Decreased coordination;Decreased posture;Decreased cognition  Assessment: Pt amb 40ft + 60ft + 30ft with RW CGA, standing rest breaks between distances. Pt slightly unsteady and impulsive throughout session, no LOB noted. At baseline, pt ambulates independently without AD. Pt would be unsafe to return to prior living arrangements d/t cognitive deficits, continues to be a fall risk. Pt would benefit from continued skilled therapy to address deficits in safety, endurance, and strength.  Therapy Prognosis: Good  Requires PT Follow-Up: Yes  Activity  cooperative, pt supine in bed upon arrival, helmet donned while pt in supine before initiating mobility  Pain  Pre-Pain:  (Pt states \"my head hurts\" intermittently, unable to formally rate)  Pain Location: Head  Pain Descriptor: Aching  Pain Interventions: Repositioning;Rest    Objective  Orientation  Overall Orientation Status: Impaired  Orientation Level: Disoriented X4 (Aphasia)  Cognition  Overall Cognitive Status: Exceptions  Arousal/Alertness: Appropriate responses to stimuli  Following Commands: Follows one step commands with repetition;Follows one step commands with increased time  Attention Span: Attends with cues to redirect;Difficulty attending to directions  Safety Judgement: Decreased awareness of need for assistance;Decreased awareness of need for safety  Problem Solving: Assistance required to identify errors made;Assistance required to correct errors made;Decreased awareness of errors  Insights: Decreased awareness of deficits  Initiation: Requires cues for some  Sequencing: Requires cues for some  Cognition Comment: Pt impulsive with mobility, aphasia. Easily distracted, many family members in room, attends with cues to redirect.    Mobility   Bed mobility  Supine to Sit: Stand by assistance  Sit to Supine: Stand by assistance  Scooting: Supervision  Bed Mobility Comments: HOB elevated, helmet donned prior to mobility.    Transfers  Sit to Stand: Contact guard assistance  Stand to Sit: Contact guard assistance  Comment: Transfer x2 with RW CGA. Pt requiring cues for proper hand placement with fair return. Repetitive cues to remain seated throughout session with good return.    Ambulation  Surface: Level tile  Device: Rolling Walker  Assistance: Contact guard assistance  Quality of Gait: Slightly unsteady  Gait Deviations: Slow July;Decreased step length  Distance: 40ft, standing rest break, 60ft, standing rest break, 30ft  Comments: Pt amb 40ft + 60ft + 30ft with RW CGA, standing rest breaks

## 2025-04-18 NOTE — CARE COORDINATION
Case Management   Daily Progress Note       Patient Name: Jack Gómez                   YOB: 1985  Diagnosis: Intracranial hemorrhage (HCC) [I62.9]  Intracerebral hemorrhage, nontraumatic (HCC) [I61.9]  Altered mental status, unspecified altered mental status type [R41.82]                       GMLOS: 6.5 days  Length of Stay: 8  days    Anticipated Discharge Date: Discharge pending    Readmission Risk (Low < 19, Mod (19-27), High > 27): Readmission Risk Score: 7.5        Current Transitional Plan    [] Home Independently    [] Home with HC    [] Skilled Nursing Facility    [x] Acute Rehabilitation    [] Long Term Acute Care (LTAC)    [] Other:     Plan for the Stay (Medical Management) :          Workflow Continuation (Additional Notes) :    Pt. And family updated on transitional planning. Informed that we are waiting for precert approval from insurance [facility]. Pt. And family verbalized understanding.     Transport time for 1630. Called left a  informing of patient pickup time with [facility] rehab center.     Felix Kerr  April 18, 2025

## 2025-04-18 NOTE — PLAN OF CARE
Problem: Discharge Planning  Goal: Discharge to home or other facility with appropriate resources  4/18/2025 1801 by Maximino Dale RN  Outcome: Completed  Flowsheets (Taken 4/18/2025 0800)  Discharge to home or other facility with appropriate resources: Identify barriers to discharge with patient and caregiver  4/18/2025 0614 by Lulu Girard RN  Outcome: Progressing     Problem: Respiratory - Adult  Goal: Achieves optimal ventilation and oxygenation  4/18/2025 1801 by Maximino Dale RN  Outcome: Completed  Flowsheets (Taken 4/18/2025 0800)  Achieves optimal ventilation and oxygenation: Assess for changes in respiratory status  4/18/2025 0614 by Lulu Girard RN  Outcome: Progressing     Problem: Safety - Adult  Goal: Free from fall injury  4/18/2025 1801 by Maximino Dale RN  Outcome: Completed  Flowsheets (Taken 4/18/2025 0800)  Free From Fall Injury: Instruct family/caregiver on patient safety  4/18/2025 0614 by Lulu Girard RN  Outcome: Progressing     Problem: Neurosensory - Adult  Goal: Achieves stable or improved neurological status  4/18/2025 1801 by Maximino Dale RN  Outcome: Completed  Flowsheets (Taken 4/18/2025 0800)  Achieves stable or improved neurological status: Assess for and report changes in neurological status  4/18/2025 0614 by Lulu Girard RN  Outcome: Progressing  Goal: Absence of seizures  4/18/2025 1801 by Maximino Dale RN  Outcome: Completed  Flowsheets (Taken 4/18/2025 0800)  Absence of seizures: Monitor for seizure activity.  If seizure occurs, document type and location of movements and any associated apnea  4/18/2025 0614 by Lulu Girard RN  Outcome: Progressing  Goal: Remains free of injury related to seizures activity  4/18/2025 1801 by Maximino Dale RN  Outcome: Completed  Flowsheets (Taken 4/18/2025 0800)  Remains free of injury related to seizure activity: Maintain airway, patient safety  and administer oxygen as ordered  4/18/2025 0614

## 2025-04-18 NOTE — CARE COORDINATION
Met with pt to assess for support and complete PHQ-9 screen. Pt's aazlea present, with pt permission. Pt stated she lives with her husb, parents, sister and her 4 children (ages 22, 18, 14, 11). She reports family is supportive and help out as needed. Azalea stated he has been staying here with her. Pt shared she is a . Pt denied any recent feelings of depression/SI.  Patient Health Questionnaire-9 (PHQ-9)    Over the last 2 weeks, how often have you been bothered by any of the following problems?    1. Little Interest or pleasure in doing things?   [x] Not at all  [] Several Days  [] More than half the day  []  Nearly every day    2. Feeling down, depressed or hopeless?    [x] Not at all  [] Several Days  [] More than half the day  []  Nearly every day    3. Trouble falling or staying asleep, or sleeping too much?   [x] Not at all  [] Several Days  [] More than half the day  []  Nearly every day    4. Feeling tired or having little energy?   [x] Not at all  [] Several Days  [] More than half the day  []  Nearly every day    5. Poor apettite or overeating?   [x] Not at all  [] Several Days  [] More than half the day  []  Nearly every day    6. Feeling bad about yourself-or that you are a failure or have let yourself or your family down?   [x] Not at all  [] Several Days  [] More than half the day  []  Nearly every day    7. Trouble concentrating on things, such as reading the newspaper or watching television?   [x] Not at all  [] Several Days  [] More than half the day  []  Nearly every day    8. Moving or speaking so slowly that other people could have noticed? Or the opposite-being so fidgety or restless that you have been moving around a lot more than usual?   [x] Not at all  [] Several Days  [] More than half the day  []  Nearly every day    9. Thoughts that you would be better off dead or of hurting yourself in some way?   [x] Not at all  [] Several Days  [] More than half the day  []  Nearly every

## 2025-04-18 NOTE — PROGRESS NOTES
Neurosurgery [facility]/Resident    Daily Progress Note   Chief Complaint   Patient presents with    Cerebrovascular Accident     4/18/2025  7:01 AM    Chart reviewed.  No acute events overnight.  No new complaints. Aphasia has improved today, pt states she has some incisional pain.     Vitals:    04/17/25 1249 04/17/25 1600 04/17/25 2000 04/18/25 0111   BP:  110/67 112/76 98/63   Pulse:  98  66   Resp: 17 20 14   Temp:  98.4 °F (36.9 °C) 98.8 °F (37.1 °C)    TempSrc:  Oral Oral    SpO2:  100%  98%   Weight:       Height:             PE:   Was able to follow commands better today, remains aphasic- but improving   PERRL, EOMI- has photosensitivity     Motor   L deltoid 5/5; R deltoid 5/5  L biceps 5/5; R biceps 5/5  L triceps 5/5; R triceps 5/5  L wrist extension 5/5; R wrist extension 5/5     L iliopsoas 5/5 , R iliopsoas 5/5  L quadriceps 5/5; R quadriceps 5/5  L Dorsiflexion 5/5; R dorsiflexion 5/5  L Plantarflexion 5/5; R plantarflexion 5/5    Sensation intact    Incision JADE, localized swelling unchanged       Lab Results   Component Value Date    WBC 12.7 (H) 04/18/2025    HGB 10.6 (L) 04/18/2025    HCT 32.7 (L) 04/18/2025     04/18/2025    CHOL 155 04/11/2025    TRIG 152 (H) 04/11/2025    HDL 34 (L) 04/11/2025    ALT 16 07/31/2024    AST 19 07/31/2024     (L) 04/18/2025    K 3.6 (L) 04/18/2025     04/18/2025    CREATININE 0.7 04/18/2025    BUN 22 (H) 04/18/2025    CO2 24 04/18/2025    TSH 0.59 04/10/2025    INR 1.1 04/13/2025    LABA1C 5.1 04/11/2025    CRP 4.5 05/07/2021    SEDRATE 12 05/07/2021       Radiology     MRI BRAIN W WO CONTRAST  Result Date: 4/10/2025  EXAMINATION: MRI OF THE BRAIN WITHOUT AND WITH CONTRAST  4/10/2025 10:26 am TECHNIQUE: Multiplanar multisequence MRI of the head/brain was performed without and with the administration of intravenous contrast. COMPARISON: CT head performed 04/10/2025. HISTORY: ORDERING SYSTEM PROVIDED HISTORY: brain mass TECHNOLOGIST PROVIDED HISTORY:

## 2025-04-18 NOTE — PLAN OF CARE
Problem: Discharge Planning  Goal: Discharge to home or other facility with appropriate resources  4/18/2025 0614 by Lulu Girard RN  Outcome: Progressing  4/17/2025 1819 by Maximino Dale RN  Outcome: Progressing  Flowsheets (Taken 4/17/2025 0800)  Discharge to home or other facility with appropriate resources: Identify barriers to discharge with patient and caregiver     Problem: Respiratory - Adult  Goal: Achieves optimal ventilation and oxygenation  4/18/2025 0614 by Lulu Girard RN  Outcome: Progressing  4/17/2025 1819 by Maximino Dale RN  Outcome: Progressing  Flowsheets (Taken 4/17/2025 0800)  Achieves optimal ventilation and oxygenation: Assess for changes in respiratory status     Problem: Safety - Adult  Goal: Free from fall injury  4/18/2025 0614 by Lulu Girard RN  Outcome: Progressing  4/17/2025 1819 by Maximino Dale RN  Outcome: Progressing  Flowsheets (Taken 4/17/2025 0800)  Free From Fall Injury: Instruct family/caregiver on patient safety     Problem: Neurosensory - Adult  Goal: Achieves stable or improved neurological status  4/18/2025 0614 by Lulu Girard RN  Outcome: Progressing  4/17/2025 1819 by Maximino Dale RN  Outcome: Progressing  Flowsheets (Taken 4/17/2025 0800)  Achieves stable or improved neurological status: Assess for and report changes in neurological status  Goal: Absence of seizures  4/18/2025 0614 by Lulu Girard RN  Outcome: Progressing  4/17/2025 1819 by Maximino Dale RN  Outcome: Progressing  Flowsheets (Taken 4/17/2025 0800)  Absence of seizures: Monitor for seizure activity.  If seizure occurs, document type and location of movements and any associated apnea  Goal: Remains free of injury related to seizures activity  4/18/2025 0614 by Lulu Girard RN  Outcome: Progressing  4/17/2025 1819 by Maximino Dale RN  Outcome: Progressing  Flowsheets (Taken 4/17/2025 0800)  Remains free of injury related to seizure activity:  ALENA Lawson  Outcome: Progressing  4/17/2025 1819 by Maximino Dale RN  Outcome: Progressing  Flowsheets (Taken 4/17/2025 0800)  Maintains or returns to baseline bowel function: Assess bowel function  Goal: Maintains adequate nutritional intake  4/18/2025 0614 by Lulu Girard RN  Outcome: Progressing  4/17/2025 1819 by Maximino Dale RN  Outcome: Progressing  Flowsheets (Taken 4/17/2025 0800)  Maintains adequate nutritional intake: Monitor percentage of each meal consumed     Problem: Genitourinary - Adult  Goal: Absence of urinary retention  4/18/2025 0614 by Lulu Girard RN  Outcome: Progressing  4/17/2025 1819 by Maximino Dale RN  Outcome: Progressing  Flowsheets (Taken 4/17/2025 0800)  Absence of urinary retention: Assess patient’s ability to void and empty bladder  Goal: Urinary catheter remains patent  4/18/2025 0614 by Lulu Girard RN  Outcome: Progressing  4/17/2025 1819 by Maximino Dale RN  Outcome: Progressing  Flowsheets (Taken 4/17/2025 0800)  Urinary catheter remains patent: Assess patency of urinary catheter     Problem: Infection - Adult  Goal: Absence of infection at discharge  4/18/2025 0614 by Lulu Girard RN  Outcome: Progressing  4/17/2025 1819 by Maximino Dale RN  Outcome: Progressing  Flowsheets (Taken 4/17/2025 0800)  Absence of infection at discharge: Assess and monitor for signs and symptoms of infection  Goal: Absence of infection during hospitalization  4/18/2025 0614 by Lulu Girard RN  Outcome: Progressing  4/17/2025 1819 by Maximino Dale RN  Outcome: Progressing  Flowsheets (Taken 4/17/2025 0800)  Absence of infection during hospitalization: Assess and monitor for signs and symptoms of infection  Goal: Absence of fever/infection during anticipated neutropenic period  4/18/2025 0614 by Lulu Girard RN  Outcome: Progressing  4/17/2025 1819 by Maximino Dale RN  Outcome: Progressing  Flowsheets (Taken 4/17/2025 0800)  Absence of

## 2025-04-18 NOTE — DISCHARGE INSTR - COC
Continuity of Care Form    Patient Name: Jack Gómez   :  1985  MRN:  [id number]    Admit date:  4/10/2025  Discharge date:  ***    Code Status Order: Full Code   Advance Directives:     Admitting Physician:  Enzo Andujar MD  PCP: Debbie Villareal MD    Discharging Nurse: ***  Discharging Hospital Unit/Room#: 0134/0134-01  Discharging Unit Phone Number: ***    Emergency Contact:   Extended Emergency Contact Information  Primary Emergency Contact: ROSHANYOKASTA  Mobile Phone: [phone]  Relation: Parent    Past Surgical History:  Past Surgical History:   Procedure Laterality Date    CLEFT LIP REPAIR      COSMETIC SURGERY      rinoplasty    CRANIOTOMY Left 4/10/2025    LEFT CRANIECTOMY FOR HEMATOMA performed by Amanda Palmer DO at [facility] OR    HYSTERECTOMY (CERVIX STATUS UNKNOWN)      OVARY REMOVAL Left     TUBAL LIGATION Bilateral     UPPER GASTROINTESTINAL ENDOSCOPY  12/2/15    UPPER GASTROINTESTINAL ENDOSCOPY N/A 2021    EGD BIOPSY performed by Ruddy Ivan MD at [facility] [place] OR    UPPER GASTROINTESTINAL ENDOSCOPY N/A 8/3/2021    EGD BIOPSY performed by Diego Machado MD at [facility] ENDO       Immunization History:   Immunization History   Administered Date(s) Administered    COVID-19, PFIZER PURPLE top, DILUTE for use, (age 12 y+), 30mcg/0.3mL 2021, 2021       Active Problems:  Patient Active Problem List   Diagnosis Code    High-risk pregnancy O09.90    Epigastric pain R10.13    Nausea R11.0    Bloating R14.0    Generalized abdominal pain R10.84    Right upper quadrant pain R10.11    Abdominal pain, right upper quadrant R10.11    Other irritable bowel syndrome K58.8    Anxiety F41.9    Abdominal pain R10.9    Intracerebral hemorrhage, nontraumatic (HCC) I61.9    Intracranial hemorrhage (HCC) I62.9    Altered mental status R41.82    Cerebral edema (HCC) G93.6    Leupp coma scale total score 3-8 (HCC) R40.2430    Uncal herniation (HCC) G93.5    Midline shift of brain due

## 2025-04-18 NOTE — PROGRESS NOTES
Speech Language Pathology  [facility]    Speech Language Treatment Note    Date: 4/18/2025  Patient’s Name: Jack Gómez  MRN: [id number]  Diagnosis:   Patient Active Problem List   Diagnosis Code    High-risk pregnancy O09.90    Epigastric pain R10.13    Nausea R11.0    Bloating R14.0    Generalized abdominal pain R10.84    Right upper quadrant pain R10.11    Abdominal pain, right upper quadrant R10.11    Other irritable bowel syndrome K58.8    Anxiety F41.9    Abdominal pain R10.9    Intracerebral hemorrhage, nontraumatic (HCC) I61.9    Intracranial hemorrhage (HCC) I62.9    Altered mental status R41.82    Cerebral edema (HCC) G93.6    Summerfield coma scale total score 3-8 (HCC) R40.2430    Uncal herniation (HCC) G93.5    Midline shift of brain due to hematoma (HCC) S06.2XAA, S06.A0XA       Pain: 0/10    Speech and Language Treatment  Treatment time: 4520-2631    Subjective: [x] Alert [x] Cooperative     [] Confused     [] Agitated    [] Lethargic      Objective/Assessment:  Auditory Comprehension: Yes/no simple-moderate: 0/5 increased to 5/5 with repetition/mod visual and verbal cues    Wh- questions simple: 3/5 increased to 5/5 with phonemic cues, mod-max verbal and visual cues    1 step commands: 2/6 increased to 6/6 with min-mod visual cues and repetitions    Verbal Expression: Pt initiating fluent, spontaneous appropriate sentences and questions with occasional word finding difficulty, however receptive deficits impact conversation.    Automatic speech food associations: 1/8 increased to 7/8 with mod verbal and phonemic cues    Opposites: 5/10 increased to 9/10 with phonemic and mod visual cues    Picture naming: 3/5 increased to 5/5 with min-mod phonemic/verbal cues    Reviewed education provided re compensatory strategies for aphasia (gestures/visuals, circumlocution, phonemic cues). Pt and family verbalized understanding.    Other: Pt alert and engaged throughout, continues to make

## 2025-04-18 NOTE — PROGRESS NOTES
[facility] Neurology   IN-PATIENT SERVICE   [facility]    Progress Note             Date:   4/18/2025  Patient name:  Jack Gómez  Date of admission:  4/10/2025  7:28 AM  MRN:   [id number]  Account:  [id number]  YOB: 1985  PCP:    Debbei Villareal MD  Room:   [address] [place] [address]  Code Status:    Full Code    Chief Complaint:     Chief Complaint   Patient presents with    Cerebrovascular Accident       Interval hx:     The patient was seen and examined at bedside.   Is vitally stable, alert and oriented x 3.   No acute events overnight.  Neuro exam: Mild paraphasic errors, right upper and right lower extremity 4/5  Planned for [facility] ARU,  working on it  Neurosurgery and neuroendovascular still discussing about starting the patient on anticoagulation or not      Brief History of Present Illness:       40 YO F with Hx of HTN, previously on Estradiol who presented on 4/10 as a critical stroke alert. Patient is , noted to develop sudden onset right sided weakness, and aphasia as well as slurred speech. On arrival patient was taken to the CT scanner, found to have posterior temporal lobe hemorrhage with LR midline shift.      Patient on exam initially improved and then worsened post MRI brain which showed possible brain mass versus venous infarct. MRI brain showed large FT IPH with associated edema and 11 mm midline shift. Non occlusive thrombus in the left Transverse sinus and left IJ vein were noted. Post MRI patient had a non reactive dilated left pupil, for which she was given 23.4% bolus as well as mannitol and then taken for emergent evacuation of hematoma. Intra-op pathology frozen section reported a possible glioma.      Postop CTH showed evacuation of left side IPH with decreased mass effect.   Patient was extubated on 4/11 and subgaleal drain removed on 4/12.  Neuroendovascular was consulted for DSA to evaluate for AVM and aVF .  The patient

## 2025-04-19 LAB
F5 P.R506Q BLD/T QL: NEGATIVE
SPECIMEN SOURCE: NORMAL

## 2025-04-30 ENCOUNTER — OFFICE VISIT (OUTPATIENT)
Dept: NEUROSURGERY | Age: 40
End: 2025-04-30

## 2025-04-30 VITALS
DIASTOLIC BLOOD PRESSURE: 93 MMHG | WEIGHT: 165 LBS | BODY MASS INDEX: 25.9 KG/M2 | HEART RATE: 101 BPM | SYSTOLIC BLOOD PRESSURE: 119 MMHG | HEIGHT: 67 IN

## 2025-04-30 DIAGNOSIS — Z98.890 S/P CRANIOTOMY: Primary | ICD-10-CM

## 2025-04-30 DIAGNOSIS — Z98.890 S/P EVACUATION OF HEMATOMA: ICD-10-CM

## 2025-04-30 DIAGNOSIS — G93.89 BRAIN MASS: ICD-10-CM

## 2025-04-30 PROCEDURE — 99024 POSTOP FOLLOW-UP VISIT: CPT

## 2025-04-30 NOTE — PROGRESS NOTES
[facility] NEUROSURGERY [facility]  [address]  [id number]  [address]  [facility] [address]  Dept: [phone]    Patient:  Jack Gómez  YOB: 1985  Date: 4/30/25    The patient is a 39 y.o. female who presents today for consult of the following problems:     Chief Complaint   Patient presents with    Post-Op Check     LEFT CRANIECTOMY FOR HEMATOMA         HPI:     Jack Gómez is a 39 y.o. female who presents to the office for post-op evaluation s/p left craniectomy for evacuation of intracerebral hematoma with tumor.     The patient is a female  who presented with sudden onset of right-sided weakness, aphasia, and slurred speech. Imaging revealed a large left-sided intracerebral hematoma with midline shift and uncal herniation, prompting urgent left craniectomy and hematoma evacuation with possible tumor resection on 4/10/25. Postoperative DSA on 4/15 ruled out vascular abnormalities, and CT imaging showed no recurrent hemorrhage. She was discharged to [facility] acute rehab on 4/18 and to home on 4/24 with outpatient PT, OT, and speech therapy. Today, she presents accompanied by family, reports that her expressive aphasia has mostly resolved and she is able to communicate effectively. She denies headaches except mild incision discomfort due to staples. She describes blurred vision, photophobia (\"bright\" vision), and occasional right foot weakness when walking but is ambulating independently. She denies fever, chills, seizures, numbness, or bowel/bladder symptoms. She has not had visual field cuts, nausea, or vomiting.     Motor: 5/5 strength throughout  Sensation: Intact  Incision: Clean, dry, intact; staples removed  Communication: Appropriate     4/18/25 Pathology Report      Date of surgery: 4/10/25  Dr. Palmer     Assessment and Plan:     1. S/P craniotomy    2. S/P evacuation of 
TAVARES on CPAP

## 2025-05-02 ENCOUNTER — TELEPHONE (OUTPATIENT)
Dept: RADIATION ONCOLOGY | Age: 40
End: 2025-05-02

## 2025-05-02 ENCOUNTER — TELEPHONE (OUTPATIENT)
Age: 40
End: 2025-05-02

## 2025-05-02 ENCOUNTER — TELEPHONE (OUTPATIENT)
Dept: NEUROLOGY | Age: 40
End: 2025-05-02

## 2025-05-02 NOTE — TELEPHONE ENCOUNTER
Covering for RAFAELA Patterson       Name: Jack Gómez  : 1985  MRN: [id number]    Oncology Navigation- Initial Note: first attempt     Intake-  Contact Type: Telephone    Continuum of Care: Diagnosis/Active Treatment    Smoking hx: nonsmoker    Notes: Received referral for RAFAELA. Reviewed chart. 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 Pt recently was discharged from rehab and was seen by neurosurgery for post op f/u. Plan for pt to be referred to Dr Lo and Dr Melton. Noted pt has not been scheduled with either provider yet. Writer contacted offices, spoke with Vickie WYMAN who confirmed pt's insurance is not accepted through [facility]. Spoke with WILLEM Galvan, who also reports insurance is not in network.  Writer spoke to Manuel at referring providers office. Updated her that pts commercial [employer] insurance is not in [facility] network and pt will need to be seen by other providers.         Electronically signed by Shira Ramirez RN on 2025 at 12:27 PM

## 2025-05-02 NOTE — TELEPHONE ENCOUNTER
Reviewing patient chart insurance is listed as [place]? If patient has [place] unable to accept at [facility].  Notified navigator.

## 2025-05-02 NOTE — TELEPHONE ENCOUNTER
[facility] called in stating the referral placed to them is out of network being Patient has [place] insurance she will need to stay within [facility].     Please advise

## 2025-05-04 ENCOUNTER — HOSPITAL ENCOUNTER (EMERGENCY)
Age: 40
Discharge: HOME | End: 2025-05-04
Attending: EMERGENCY MEDICINE
Payer: COMMERCIAL

## 2025-05-04 ENCOUNTER — APPOINTMENT (OUTPATIENT)
Dept: CT IMAGING | Age: 40
End: 2025-05-04
Payer: COMMERCIAL

## 2025-05-04 ENCOUNTER — APPOINTMENT (OUTPATIENT)
Dept: GENERAL RADIOLOGY | Age: 40
End: 2025-05-04
Payer: COMMERCIAL

## 2025-05-04 VITALS
SYSTOLIC BLOOD PRESSURE: 123 MMHG | DIASTOLIC BLOOD PRESSURE: 85 MMHG | WEIGHT: 161 LBS | HEIGHT: 67 IN | HEART RATE: 99 BPM | BODY MASS INDEX: 25.27 KG/M2 | OXYGEN SATURATION: 97 % | RESPIRATION RATE: 17 BRPM | TEMPERATURE: 98.5 F

## 2025-05-04 DIAGNOSIS — R07.9 CHEST PAIN, UNSPECIFIED TYPE: Primary | ICD-10-CM

## 2025-05-04 LAB
ALBUMIN SERPL-MCNC: 4 G/DL (ref 3.5–5.2)
ALBUMIN/GLOB SERPL: 1.3 {RATIO} (ref 1–2.5)
ALP SERPL-CCNC: 135 U/L (ref 35–104)
ALT SERPL-CCNC: 86 U/L (ref 10–35)
ANION GAP SERPL CALCULATED.3IONS-SCNC: 15 MMOL/L (ref 9–16)
AST SERPL-CCNC: 58 U/L (ref 10–35)
BASOPHILS # BLD: <0.03 K/UL (ref 0–0.2)
BASOPHILS NFR BLD: 0 % (ref 0–2)
BILIRUB SERPL-MCNC: 0.3 MG/DL (ref 0–1.2)
BUN SERPL-MCNC: 8 MG/DL (ref 6–20)
CALCIUM SERPL-MCNC: 9.3 MG/DL (ref 8.6–10.4)
CHLORIDE SERPL-SCNC: 103 MMOL/L (ref 98–107)
CO2 SERPL-SCNC: 23 MMOL/L (ref 20–31)
CREAT SERPL-MCNC: 0.7 MG/DL (ref 0.6–0.9)
D DIMER PPP FEU-MCNC: 1.32 UG/ML FEU (ref 0–0.57)
EOSINOPHIL # BLD: 0.27 K/UL (ref 0–0.44)
EOSINOPHILS RELATIVE PERCENT: 7 % (ref 1–4)
ERYTHROCYTE [DISTWIDTH] IN BLOOD BY AUTOMATED COUNT: 13.3 % (ref 11.8–14.4)
ERYTHROCYTE [SEDIMENTATION RATE] IN BLOOD BY PHOTOMETRIC METHOD: 33 MM/HR (ref 0–20)
GFR, ESTIMATED: >90 ML/MIN/1.73M2
GLUCOSE SERPL-MCNC: 114 MG/DL (ref 74–99)
HCT VFR BLD AUTO: 33.8 % (ref 36.3–47.1)
HGB BLD-MCNC: 11.2 G/DL (ref 11.9–15.1)
IMM GRANULOCYTES # BLD AUTO: <0.03 K/UL (ref 0–0.3)
IMM GRANULOCYTES NFR BLD: 0 %
LIPASE SERPL-CCNC: 73 U/L (ref 13–60)
LYMPHOCYTES NFR BLD: 1.86 K/UL (ref 1.1–3.7)
LYMPHOCYTES RELATIVE PERCENT: 50 % (ref 24–43)
MAGNESIUM SERPL-MCNC: 2.1 MG/DL (ref 1.6–2.6)
MCH RBC QN AUTO: 28.4 PG (ref 25.2–33.5)
MCHC RBC AUTO-ENTMCNC: 33.1 G/DL (ref 28.4–34.8)
MCV RBC AUTO: 85.6 FL (ref 82.6–102.9)
MONOCYTES NFR BLD: 0.36 K/UL (ref 0.1–1.2)
MONOCYTES NFR BLD: 9 % (ref 3–12)
NEUTROPHILS NFR BLD: 34 % (ref 36–65)
NEUTS SEG NFR BLD: 1.31 K/UL (ref 1.5–8.1)
NRBC BLD-RTO: 0 PER 100 WBC
PLATELET # BLD AUTO: 257 K/UL (ref 138–453)
PMV BLD AUTO: 9.5 FL (ref 8.1–13.5)
POTASSIUM SERPL-SCNC: 3.7 MMOL/L (ref 3.7–5.3)
PROT SERPL-MCNC: 7 G/DL (ref 6.6–8.7)
RBC # BLD AUTO: 3.95 M/UL (ref 3.95–5.11)
SODIUM SERPL-SCNC: 141 MMOL/L (ref 136–145)
TROPONIN I SERPL HS-MCNC: 6 NG/L (ref 0–14)
TROPONIN I SERPL HS-MCNC: <6 NG/L (ref 0–14)
WBC OTHER # BLD: 3.8 K/UL (ref 3.5–11.3)

## 2025-05-04 PROCEDURE — 85379 FIBRIN DEGRADATION QUANT: CPT

## 2025-05-04 PROCEDURE — 84484 ASSAY OF TROPONIN QUANT: CPT

## 2025-05-04 PROCEDURE — 83735 ASSAY OF MAGNESIUM: CPT

## 2025-05-04 PROCEDURE — 93005 ELECTROCARDIOGRAM TRACING: CPT

## 2025-05-04 PROCEDURE — 85652 RBC SED RATE AUTOMATED: CPT

## 2025-05-04 PROCEDURE — 71260 CT THORAX DX C+: CPT

## 2025-05-04 PROCEDURE — 83690 ASSAY OF LIPASE: CPT

## 2025-05-04 PROCEDURE — 2580000003 HC RX 258

## 2025-05-04 PROCEDURE — 71045 X-RAY EXAM CHEST 1 VIEW: CPT

## 2025-05-04 PROCEDURE — 6360000002 HC RX W HCPCS

## 2025-05-04 PROCEDURE — 85025 COMPLETE CBC W/AUTO DIFF WBC: CPT

## 2025-05-04 PROCEDURE — 6360000004 HC RX CONTRAST MEDICATION

## 2025-05-04 PROCEDURE — 96374 THER/PROPH/DIAG INJ IV PUSH: CPT

## 2025-05-04 PROCEDURE — 99285 EMERGENCY DEPT VISIT HI MDM: CPT

## 2025-05-04 PROCEDURE — 96361 HYDRATE IV INFUSION ADD-ON: CPT

## 2025-05-04 PROCEDURE — 80053 COMPREHEN METABOLIC PANEL: CPT

## 2025-05-04 RX ORDER — 0.9 % SODIUM CHLORIDE 0.9 %
1000 INTRAVENOUS SOLUTION INTRAVENOUS ONCE
Status: COMPLETED | OUTPATIENT
Start: 2025-05-04 | End: 2025-05-04

## 2025-05-04 RX ORDER — DICYCLOMINE HYDROCHLORIDE 10 MG/1
10 CAPSULE ORAL
Qty: 16 CAPSULE | Refills: 0 | Status: SHIPPED | OUTPATIENT
Start: 2025-05-04 | End: 2025-05-08

## 2025-05-04 RX ORDER — PANTOPRAZOLE SODIUM 40 MG/1
40 TABLET, DELAYED RELEASE ORAL
Qty: 5 TABLET | Refills: 0 | Status: SHIPPED | OUTPATIENT
Start: 2025-05-04 | End: 2025-05-09

## 2025-05-04 RX ORDER — IOPAMIDOL 755 MG/ML
75 INJECTION, SOLUTION INTRAVASCULAR
Status: COMPLETED | OUTPATIENT
Start: 2025-05-04 | End: 2025-05-04

## 2025-05-04 RX ADMIN — IOPAMIDOL 75 ML: 755 INJECTION, SOLUTION INTRAVENOUS at 16:18

## 2025-05-04 RX ADMIN — SODIUM CHLORIDE 1000 ML: 0.9 INJECTION, SOLUTION INTRAVENOUS at 16:32

## 2025-05-04 RX ADMIN — SODIUM CHLORIDE 40 MG: 9 INJECTION INTRAMUSCULAR; INTRAVENOUS; SUBCUTANEOUS at 15:36

## 2025-05-04 ASSESSMENT — HEART SCORE: ECG: NORMAL

## 2025-05-04 ASSESSMENT — PAIN SCALES - GENERAL: PAINLEVEL_OUTOF10: 5

## 2025-05-04 ASSESSMENT — PAIN - FUNCTIONAL ASSESSMENT: PAIN_FUNCTIONAL_ASSESSMENT: 0-10

## 2025-05-04 NOTE — ED PROVIDER NOTES
[facility] EMERGENCY DEPARTMENT     Emergency Department     Faculty Attestation    I performed a history and physical examination of the patient and discussed management with the resident. I reviewed the resident’s note and agree with the documented findings and plan of care. Any areas of disagreement are noted on the chart. I was personally present for the key portions of any procedures. I have documented in the chart those procedures where I was not present during the key portions. I have reviewed the emergency nurses triage note. I agree with the chief complaint, past medical history, past surgical history, allergies, medications, social and family history as documented unless otherwise noted below. For Physician Assistant/ Nurse Practitioner cases/documentation I have personally evaluated this patient and have completed at least one if not all key elements of the E/M (history, physical exam, and MDM). Additional findings are as noted.    Note Started: 3:55 PM EDT    Patient with pain anterior, began yesterday.  Nonradiating.  No shortness of breath currently did have some earlier no nausea vomiting.  Patient is only a few weeks out from craniotomy for hypertensive hemorrhage.  States she has been doing well since she came home family agrees who provides independent history.  No prior history of cardiac issues.  Only PE risk factor is recent surgery and hospitalization.  Resting comfortably on exam chatting with family.  Lungs clear and equal.  Heart is tachycardic in the low 100s but strong for extremity pulses.  Abdomen soft nontender.  Normal pupils normal mental status normal speech.  Soft nontender no asymmetry in the calves no edema.  Will check labs EKG chest x-ray plan for PE study is moderate to high risk [place] reevaluate    EKG interpretation: Sinus tachycardia 111.  Normal intervals normal axis no acute ST or T changes.  No acute findings except rate      Critical Care     none    Mika

## 2025-05-04 NOTE — ED PROVIDER NOTES
[facility] EMERGENCY DEPARTMENT  Emergency Department Encounter  Emergency Medicine Resident     Pt Name:Jack Yu  MRN: [id number]  Birthdate 1985  Date of evaluation: 5/4/25  PCP:  Debbie Villareal MD  Note Started: 3:35 PM EDT      CHIEF COMPLAINT       Chief Complaint   Patient presents with    Chest Pain    Constipation       HISTORY OF PRESENT ILLNESS  (Location/Symptom, Timing/Onset, Context/Setting, Quality, Duration, Modifying Factors, Severity.)      Jack Yu is a 39 y.o. female with a past medical history of craniotomy, intracranial hemorrhage, GERD who presents with 1 day history of midsternal chest pain that started today at with the patient was resting.  Patient described the pain as sharp alternating with pressure, localized, constant in nature.  Patient states that she has some shortness of breath as well.  Patient also states that she has not been able to have a bowel movement since surgery, have been using MiraLAX.  Denies any nausea, vomiting, diarrhea, urinary symptoms.  Patient is not on OCP, no history of DVT or pulmonary embolism.    PAST MEDICAL / SURGICAL / SOCIAL / FAMILY HISTORY      has a past medical history of Anxiety, GERD (gastroesophageal reflux disease), Nausea, PONV (postoperative nausea and vomiting), and Ulcer of gastric fundus.       has a past surgical history that includes Tubal ligation (Bilateral, 20134); Ovary removal (Left); Upper gastrointestinal endoscopy (12/2/15); Hysterectomy; Upper gastrointestinal endoscopy (N/A, 5/7/2021); Cosmetic surgery; Cleft lip repair; Upper gastrointestinal endoscopy (N/A, 8/3/2021); and craniotomy (Left, 4/10/2025).      Social History     Socioeconomic History    Marital status:      Spouse name: Not on file    Number of children: Not on file    Years of education: Not on file    Highest education level: Not on file   Occupational History    Not on file   Tobacco Use    Smoking status:

## 2025-05-04 NOTE — ED NOTES
Patient arrived to the ED with c/o chest pain and constipation. Patient states she had a stroke in April and had a craniotomy. Patient states since then she has not had good bowel movements. Patient states that this morning she took her miralax and started to get mid sternal cheat pain after that. Patient states she has residual right sided weakness r/t the stroke. Patient has stable vitals. Patient is alert and oriented x4. Patient has supportive family at bedside. Patient placed on tele monitor. Patient resting comfortably in bed with call light in reach.

## 2025-05-04 NOTE — DISCHARGE INSTRUCTIONS
You came in with 1 day history of chest pain but denied any shortness of breath.  We obtained some labs which were within normal limits except for mild increase in lipase and your liver function enzyme.  We also obtained a an x-ray and a CT PE, which was negative.  We think this is more likely gastritis.  We recommend symptomatic treatment with antiacid.    We recommend that you follow-up with your primary care physician to discuss your emergency ED.    If you have worsening of symptoms or develop any new symptoms like shortness of breath, chest pain, fever, nausea, vomiting please come back to the emergency department.

## 2025-05-05 LAB
EKG ATRIAL RATE: 111 BPM
EKG P AXIS: 55 DEGREES
EKG P-R INTERVAL: 142 MS
EKG Q-T INTERVAL: 344 MS
EKG QRS DURATION: 66 MS
EKG QTC CALCULATION (BAZETT): 467 MS
EKG R AXIS: 43 DEGREES
EKG T AXIS: 40 DEGREES
EKG VENTRICULAR RATE: 111 BPM

## 2025-05-05 PROCEDURE — 93010 ELECTROCARDIOGRAM REPORT: CPT | Performed by: INTERNAL MEDICINE

## 2025-05-09 NOTE — TELEPHONE ENCOUNTER
Spoke with Patient personally she works at [employer] not [employer] but [facility] already advised Patient her insurance is out of network so she scheduled with [facility] instead.     FYI Only

## 2025-06-11 ENCOUNTER — TELEPHONE (OUTPATIENT)
Dept: NEUROSURGERY | Age: 40
End: 2025-06-11

## 2025-06-11 ENCOUNTER — OFFICE VISIT (OUTPATIENT)
Dept: NEUROSURGERY | Age: 40
End: 2025-06-11
Payer: COMMERCIAL

## 2025-06-11 VITALS
DIASTOLIC BLOOD PRESSURE: 87 MMHG | HEIGHT: 67 IN | OXYGEN SATURATION: 98 % | WEIGHT: 166.6 LBS | TEMPERATURE: 98.2 F | SYSTOLIC BLOOD PRESSURE: 124 MMHG | HEART RATE: 96 BPM | BODY MASS INDEX: 26.15 KG/M2

## 2025-06-11 DIAGNOSIS — Q75.8: ICD-10-CM

## 2025-06-11 DIAGNOSIS — I61.0 NONTRAUMATIC SUBCORTICAL HEMORRHAGE OF LEFT CEREBRAL HEMISPHERE (HCC): ICD-10-CM

## 2025-06-11 DIAGNOSIS — C71.9 GBM (GLIOBLASTOMA MULTIFORME) (HCC): Primary | ICD-10-CM

## 2025-06-11 PROCEDURE — 99213 OFFICE O/P EST LOW 20 MIN: CPT | Performed by: NEUROLOGICAL SURGERY

## 2025-06-11 NOTE — PROGRESS NOTES
[facility] NEUROSURGERY [address]  [address] - GROUND FLOOR, [address]  [facility] [address]  Dept: [phone]    Patient:  Jack Yu  YOB: 1985  Date: 6/11/25    The patient is a 39 y.o. female who presents today for consult of the following problems:     Chief Complaint   Patient presents with    S/P craniotomy             HPI:     Jack Yu is a 39 y.o. female on whom neurosurgical consultation was requested by Debbie Villareal MD for management of high-grade glioma presenting as a new acute hemorrhage.  Patient status post evacuation of hematoma and resection of tumor approximately 2 months.  Has had significant remarkable improvement overall in terms of speech with ongoing therapy.  Has been established with both radiation oncology as well as hematology oncology at [facility] and is pending cranioplasty prior treatment with adjuvant treatment. Intermittent nausea and mild headaches. .      History:     Past Medical History:   Diagnosis Date    Anxiety     GERD (gastroesophageal reflux disease)     Nausea     PONV (postoperative nausea and vomiting)     Ulcer of gastric fundus      Past Surgical History:   Procedure Laterality Date    CLEFT LIP REPAIR      COSMETIC SURGERY      rinoplasty    CRANIOTOMY Left 4/10/2025    LEFT CRANIECTOMY FOR HEMATOMA performed by Amanda Palmer DO [facility] OR    HYSTERECTOMY (CERVIX STATUS UNKNOWN)      OVARY REMOVAL Left     TUBAL LIGATION Bilateral 20134    UPPER GASTROINTESTINAL ENDOSCOPY  12/2/15    UPPER GASTROINTESTINAL ENDOSCOPY N/A 5/7/2021    EGD BIOPSY performed by Ruddy Ivan MD at [facility] OR    UPPER GASTROINTESTINAL ENDOSCOPY N/A 8/3/2021    EGD BIOPSY performed by Diego Machado MD at [facility] ENDO     Family History   Problem Relation Age of Onset    Diabetes Mother     Cancer Paternal Uncle         stomach    Cancer Paternal

## 2025-06-11 NOTE — TELEPHONE ENCOUNTER
Dr. Yasmine Ferrell radiation oncologist at [facility] would like to talk to you. Her cell number is  [phone]

## 2025-06-12 ENCOUNTER — TELEPHONE (OUTPATIENT)
Dept: NEUROSURGERY | Age: 40
End: 2025-06-12

## 2025-06-12 NOTE — TELEPHONE ENCOUNTER
Out of network prior auth for surgery on 6/13/25 was APPROVED. Received an email from Toy at [facility] with the approval, AUTH- [id number]    Called patient and let her know surgery was approved.   Arrived at 850  Surgery at 1050  NPO after midnight   Will need a  at discharge  Also let her know that PAT will be calling her sometime today     Patient verbalized understanding.

## 2025-06-13 ENCOUNTER — HOSPITAL ENCOUNTER (INPATIENT)
Age: 40
LOS: 1 days | Discharge: HOME | DRG: 025 | End: 2025-06-14
Attending: NEUROLOGICAL SURGERY | Admitting: NEUROLOGICAL SURGERY
Payer: COMMERCIAL

## 2025-06-13 ENCOUNTER — ANESTHESIA (OUTPATIENT)
Dept: OPERATING ROOM | Age: 40
End: 2025-06-13
Payer: COMMERCIAL

## 2025-06-13 ENCOUNTER — ANESTHESIA EVENT (OUTPATIENT)
Dept: OPERATING ROOM | Age: 40
End: 2025-06-13
Payer: COMMERCIAL

## 2025-06-13 DIAGNOSIS — G89.18 ACUTE POST-OPERATIVE PAIN: Primary | ICD-10-CM

## 2025-06-13 PROBLEM — Q75.8: Status: ACTIVE | Noted: 2025-06-13

## 2025-06-13 LAB
ANION GAP SERPL CALCULATED.3IONS-SCNC: 13 MMOL/L (ref 9–16)
BASOPHILS # BLD: 0.05 K/UL (ref 0–0.2)
BASOPHILS NFR BLD: 1 % (ref 0–2)
BUN SERPL-MCNC: 7 MG/DL (ref 6–20)
CALCIUM SERPL-MCNC: 9.7 MG/DL (ref 8.6–10.4)
CHLORIDE SERPL-SCNC: 105 MMOL/L (ref 98–107)
CO2 SERPL-SCNC: 21 MMOL/L (ref 20–31)
CREAT SERPL-MCNC: 0.8 MG/DL (ref 0.6–0.9)
EOSINOPHIL # BLD: 0 K/UL (ref 0–0.4)
EOSINOPHILS RELATIVE PERCENT: 0 % (ref 1–4)
ERYTHROCYTE [DISTWIDTH] IN BLOOD BY AUTOMATED COUNT: 12.3 % (ref 11.8–14.4)
GFR, ESTIMATED: >90 ML/MIN/1.73M2
GLUCOSE SERPL-MCNC: 154 MG/DL (ref 74–99)
HCT VFR BLD AUTO: 37 % (ref 36.3–47.1)
HGB BLD-MCNC: 11.6 G/DL (ref 11.9–15.1)
IMM GRANULOCYTES # BLD AUTO: 0 K/UL (ref 0–0.3)
IMM GRANULOCYTES NFR BLD: 0 %
LYMPHOCYTES NFR BLD: 0.54 K/UL (ref 1–4.8)
LYMPHOCYTES RELATIVE PERCENT: 12 % (ref 24–44)
MCH RBC QN AUTO: 26.7 PG (ref 25.2–33.5)
MCHC RBC AUTO-ENTMCNC: 31.4 G/DL (ref 28.4–34.8)
MCV RBC AUTO: 85.3 FL (ref 82.6–102.9)
MONOCYTES NFR BLD: 0.23 K/UL (ref 0.1–0.8)
MONOCYTES NFR BLD: 5 % (ref 1–7)
MORPHOLOGY: NORMAL
NEUTROPHILS NFR BLD: 82 % (ref 36–66)
NEUTS SEG NFR BLD: 3.68 K/UL (ref 1.8–7.7)
NRBC BLD-RTO: 0 PER 100 WBC
PLATELET # BLD AUTO: 293 K/UL (ref 138–453)
PMV BLD AUTO: 10.4 FL (ref 8.1–13.5)
POTASSIUM SERPL-SCNC: 4 MMOL/L (ref 3.7–5.3)
RBC # BLD AUTO: 4.34 M/UL (ref 3.95–5.11)
SODIUM SERPL-SCNC: 139 MMOL/L (ref 136–145)
WBC OTHER # BLD: 4.5 K/UL (ref 3.5–11.3)

## 2025-06-13 PROCEDURE — 7100000000 HC PACU RECOVERY - FIRST 15 MIN: Performed by: NEUROLOGICAL SURGERY

## 2025-06-13 PROCEDURE — 76998 US GUIDE INTRAOP: CPT | Performed by: NEUROLOGICAL SURGERY

## 2025-06-13 PROCEDURE — 6360000002 HC RX W HCPCS: Performed by: PHYSICIAN ASSISTANT

## 2025-06-13 PROCEDURE — 6370000000 HC RX 637 (ALT 250 FOR IP): Performed by: PHYSICIAN ASSISTANT

## 2025-06-13 PROCEDURE — 2500000003 HC RX 250 WO HCPCS: Performed by: NEUROLOGICAL SURGERY

## 2025-06-13 PROCEDURE — 6360000002 HC RX W HCPCS: Performed by: ANESTHESIOLOGY

## 2025-06-13 PROCEDURE — 6360000002 HC RX W HCPCS: Performed by: NEUROLOGICAL SURGERY

## 2025-06-13 PROCEDURE — 6370000000 HC RX 637 (ALT 250 FOR IP): Performed by: NEUROLOGICAL SURGERY

## 2025-06-13 PROCEDURE — 2500000003 HC RX 250 WO HCPCS: Performed by: PHYSICIAN ASSISTANT

## 2025-06-13 PROCEDURE — 6370000000 HC RX 637 (ALT 250 FOR IP): Performed by: REGISTERED NURSE

## 2025-06-13 PROCEDURE — 2000000000 HC ICU R&B

## 2025-06-13 PROCEDURE — C1729 CATH, DRAINAGE: HCPCS | Performed by: NEUROLOGICAL SURGERY

## 2025-06-13 PROCEDURE — 61107 TDH PNXR IMPLT VENTR CATH: CPT | Performed by: NEUROLOGICAL SURGERY

## 2025-06-13 PROCEDURE — 009630Z DRAINAGE OF CEREBRAL VENTRICLE WITH DRAINAGE DEVICE, PERCUTANEOUS APPROACH: ICD-10-PCS | Performed by: NEUROLOGICAL SURGERY

## 2025-06-13 PROCEDURE — 6360000002 HC RX W HCPCS: Performed by: NURSE ANESTHETIST, CERTIFIED REGISTERED

## 2025-06-13 PROCEDURE — 2580000003 HC RX 258: Performed by: NURSE ANESTHETIST, CERTIFIED REGISTERED

## 2025-06-13 PROCEDURE — 3700000001 HC ADD 15 MINUTES (ANESTHESIA): Performed by: NEUROLOGICAL SURGERY

## 2025-06-13 PROCEDURE — 3600000004 HC SURGERY LEVEL 4 BASE: Performed by: NEUROLOGICAL SURGERY

## 2025-06-13 PROCEDURE — 2500000003 HC RX 250 WO HCPCS: Performed by: ANESTHESIOLOGY

## 2025-06-13 PROCEDURE — 2580000003 HC RX 258: Performed by: PHYSICIAN ASSISTANT

## 2025-06-13 PROCEDURE — 3600000014 HC SURGERY LEVEL 4 ADDTL 15MIN: Performed by: NEUROLOGICAL SURGERY

## 2025-06-13 PROCEDURE — C1713 ANCHOR/SCREW BN/BN,TIS/BN: HCPCS | Performed by: NEUROLOGICAL SURGERY

## 2025-06-13 PROCEDURE — 2500000003 HC RX 250 WO HCPCS: Performed by: NURSE ANESTHETIST, CERTIFIED REGISTERED

## 2025-06-13 PROCEDURE — 62143 RPL B1 FLP/PROSTC PLATE SKL: CPT | Performed by: NEUROLOGICAL SURGERY

## 2025-06-13 PROCEDURE — 85025 COMPLETE CBC W/AUTO DIFF WBC: CPT

## 2025-06-13 PROCEDURE — 62143 RPL B1 FLP/PROSTC PLATE SKL: CPT | Performed by: PHYSICIAN ASSISTANT

## 2025-06-13 PROCEDURE — 2720000010 HC SURG SUPPLY STERILE: Performed by: NEUROLOGICAL SURGERY

## 2025-06-13 PROCEDURE — 80048 BASIC METABOLIC PNL TOTAL CA: CPT

## 2025-06-13 PROCEDURE — 36415 COLL VENOUS BLD VENIPUNCTURE: CPT

## 2025-06-13 PROCEDURE — 7100000001 HC PACU RECOVERY - ADDTL 15 MIN: Performed by: NEUROLOGICAL SURGERY

## 2025-06-13 PROCEDURE — 0NU607Z SUPPLEMENT LEFT TEMPORAL BONE WITH AUTOLOGOUS TISSUE SUBSTITUTE, OPEN APPROACH: ICD-10-PCS | Performed by: NEUROLOGICAL SURGERY

## 2025-06-13 PROCEDURE — 2709999900 HC NON-CHARGEABLE SUPPLY: Performed by: NEUROLOGICAL SURGERY

## 2025-06-13 PROCEDURE — 3700000000 HC ANESTHESIA ATTENDED CARE: Performed by: NEUROLOGICAL SURGERY

## 2025-06-13 PROCEDURE — 6360000002 HC RX W HCPCS

## 2025-06-13 DEVICE — 14MM BHC(1) SELF DRILLLING SCREW(6) AXS: Type: IMPLANTABLE DEVICE | Site: CRANIAL | Status: FUNCTIONAL

## 2025-06-13 RX ORDER — MIDAZOLAM HYDROCHLORIDE 2 MG/2ML
2 INJECTION, SOLUTION INTRAMUSCULAR; INTRAVENOUS ONCE
Status: DISCONTINUED | OUTPATIENT
Start: 2025-06-13 | End: 2025-06-13

## 2025-06-13 RX ORDER — CEFAZOLIN SODIUM 1 G/3ML
INJECTION, POWDER, FOR SOLUTION INTRAMUSCULAR; INTRAVENOUS
Status: DISCONTINUED | OUTPATIENT
Start: 2025-06-13 | End: 2025-06-13 | Stop reason: SDUPTHER

## 2025-06-13 RX ORDER — ROCURONIUM BROMIDE 10 MG/ML
INJECTION, SOLUTION INTRAVENOUS
Status: DISCONTINUED | OUTPATIENT
Start: 2025-06-13 | End: 2025-06-13 | Stop reason: SDUPTHER

## 2025-06-13 RX ORDER — MIRTAZAPINE 15 MG/1
15 TABLET, FILM COATED ORAL NIGHTLY
Status: DISCONTINUED | OUTPATIENT
Start: 2025-06-13 | End: 2025-06-14 | Stop reason: HOSPADM

## 2025-06-13 RX ORDER — SODIUM CHLORIDE, SODIUM LACTATE, POTASSIUM CHLORIDE, CALCIUM CHLORIDE 600; 310; 30; 20 MG/100ML; MG/100ML; MG/100ML; MG/100ML
INJECTION, SOLUTION INTRAVENOUS
Status: DISCONTINUED | OUTPATIENT
Start: 2025-06-13 | End: 2025-06-13 | Stop reason: SDUPTHER

## 2025-06-13 RX ORDER — HYDROCODONE BITARTRATE AND ACETAMINOPHEN 5; 325 MG/1; MG/1
1 TABLET ORAL EVERY 6 HOURS PRN
Status: DISCONTINUED | OUTPATIENT
Start: 2025-06-13 | End: 2025-06-13

## 2025-06-13 RX ORDER — SODIUM CHLORIDE 9 MG/ML
INJECTION, SOLUTION INTRAVENOUS PRN
Status: DISCONTINUED | OUTPATIENT
Start: 2025-06-13 | End: 2025-06-13 | Stop reason: HOSPADM

## 2025-06-13 RX ORDER — LIDOCAINE HYDROCHLORIDE AND EPINEPHRINE 10; 10 MG/ML; UG/ML
INJECTION, SOLUTION INFILTRATION; PERINEURAL PRN
Status: DISCONTINUED | OUTPATIENT
Start: 2025-06-13 | End: 2025-06-13 | Stop reason: HOSPADM

## 2025-06-13 RX ORDER — POLYETHYLENE GLYCOL 3350 17 G/17G
17 POWDER, FOR SOLUTION ORAL DAILY
Status: DISCONTINUED | OUTPATIENT
Start: 2025-06-13 | End: 2025-06-14 | Stop reason: HOSPADM

## 2025-06-13 RX ORDER — BISACODYL 5 MG/1
5 TABLET, DELAYED RELEASE ORAL DAILY PRN
Status: DISCONTINUED | OUTPATIENT
Start: 2025-06-13 | End: 2025-06-14 | Stop reason: HOSPADM

## 2025-06-13 RX ORDER — LIDOCAINE HYDROCHLORIDE 10 MG/ML
INJECTION, SOLUTION EPIDURAL; INFILTRATION; INTRACAUDAL; PERINEURAL
Status: DISCONTINUED | OUTPATIENT
Start: 2025-06-13 | End: 2025-06-13 | Stop reason: SDUPTHER

## 2025-06-13 RX ORDER — IPRATROPIUM BROMIDE AND ALBUTEROL SULFATE 2.5; .5 MG/3ML; MG/3ML
1 SOLUTION RESPIRATORY (INHALATION)
Status: DISCONTINUED | OUTPATIENT
Start: 2025-06-13 | End: 2025-06-13 | Stop reason: HOSPADM

## 2025-06-13 RX ORDER — SODIUM CHLORIDE 0.9 % (FLUSH) 0.9 %
5-40 SYRINGE (ML) INJECTION EVERY 12 HOURS SCHEDULED
Status: DISCONTINUED | OUTPATIENT
Start: 2025-06-13 | End: 2025-06-14 | Stop reason: HOSPADM

## 2025-06-13 RX ORDER — LABETALOL HYDROCHLORIDE 5 MG/ML
INJECTION, SOLUTION INTRAVENOUS
Status: DISCONTINUED | OUTPATIENT
Start: 2025-06-13 | End: 2025-06-13 | Stop reason: SDUPTHER

## 2025-06-13 RX ORDER — PANTOPRAZOLE SODIUM 40 MG/1
40 TABLET, DELAYED RELEASE ORAL DAILY
Status: DISCONTINUED | OUTPATIENT
Start: 2025-06-13 | End: 2025-06-14 | Stop reason: HOSPADM

## 2025-06-13 RX ORDER — SODIUM CHLORIDE 0.9 % (FLUSH) 0.9 %
5-40 SYRINGE (ML) INJECTION EVERY 12 HOURS SCHEDULED
Status: DISCONTINUED | OUTPATIENT
Start: 2025-06-13 | End: 2025-06-13 | Stop reason: HOSPADM

## 2025-06-13 RX ORDER — MAGNESIUM HYDROXIDE 1200 MG/15ML
LIQUID ORAL CONTINUOUS PRN
Status: DISCONTINUED | OUTPATIENT
Start: 2025-06-13 | End: 2025-06-13 | Stop reason: HOSPADM

## 2025-06-13 RX ORDER — HYDROCODONE BITARTRATE AND ACETAMINOPHEN 5; 325 MG/1; MG/1
2 TABLET ORAL EVERY 6 HOURS PRN
Status: DISCONTINUED | OUTPATIENT
Start: 2025-06-13 | End: 2025-06-13

## 2025-06-13 RX ORDER — BUTALBITAL, ACETAMINOPHEN AND CAFFEINE 50; 325; 40 MG/1; MG/1; MG/1
1 TABLET ORAL EVERY 6 HOURS PRN
Status: DISCONTINUED | OUTPATIENT
Start: 2025-06-13 | End: 2025-06-14 | Stop reason: HOSPADM

## 2025-06-13 RX ORDER — PROPOFOL 10 MG/ML
INJECTION, EMULSION INTRAVENOUS
Status: DISCONTINUED | OUTPATIENT
Start: 2025-06-13 | End: 2025-06-13 | Stop reason: SDUPTHER

## 2025-06-13 RX ORDER — SODIUM CHLORIDE 9 MG/ML
INJECTION, SOLUTION INTRAVENOUS PRN
Status: DISCONTINUED | OUTPATIENT
Start: 2025-06-13 | End: 2025-06-14 | Stop reason: HOSPADM

## 2025-06-13 RX ORDER — MIDAZOLAM HYDROCHLORIDE 1 MG/ML
INJECTION, SOLUTION INTRAMUSCULAR; INTRAVENOUS
Status: COMPLETED
Start: 2025-06-13 | End: 2025-06-13

## 2025-06-13 RX ORDER — HYDROCODONE BITARTRATE AND ACETAMINOPHEN 5; 325 MG/1; MG/1
1 TABLET ORAL EVERY 4 HOURS PRN
Status: DISCONTINUED | OUTPATIENT
Start: 2025-06-13 | End: 2025-06-14

## 2025-06-13 RX ORDER — ONDANSETRON 2 MG/ML
4 INJECTION INTRAMUSCULAR; INTRAVENOUS EVERY 6 HOURS PRN
Status: DISCONTINUED | OUTPATIENT
Start: 2025-06-13 | End: 2025-06-14 | Stop reason: HOSPADM

## 2025-06-13 RX ORDER — VANCOMYCIN HYDROCHLORIDE 1 G/20ML
INJECTION, POWDER, LYOPHILIZED, FOR SOLUTION INTRAVENOUS PRN
Status: DISCONTINUED | OUTPATIENT
Start: 2025-06-13 | End: 2025-06-13 | Stop reason: HOSPADM

## 2025-06-13 RX ORDER — MANNITOL 20 G/100ML
INJECTION, SOLUTION INTRAVENOUS
Status: DISCONTINUED | OUTPATIENT
Start: 2025-06-13 | End: 2025-06-13 | Stop reason: SDUPTHER

## 2025-06-13 RX ORDER — PROCHLORPERAZINE EDISYLATE 5 MG/ML
5 INJECTION INTRAMUSCULAR; INTRAVENOUS
Status: COMPLETED | OUTPATIENT
Start: 2025-06-13 | End: 2025-06-13

## 2025-06-13 RX ORDER — NICARDIPINE HYDROCHLORIDE 0.1 MG/ML
.5-15 INJECTION INTRAVENOUS CONTINUOUS
Status: DISCONTINUED | OUTPATIENT
Start: 2025-06-13 | End: 2025-06-14 | Stop reason: HOSPADM

## 2025-06-13 RX ORDER — HYDRALAZINE HYDROCHLORIDE 20 MG/ML
10 INJECTION INTRAMUSCULAR; INTRAVENOUS
Status: DISCONTINUED | OUTPATIENT
Start: 2025-06-13 | End: 2025-06-13 | Stop reason: HOSPADM

## 2025-06-13 RX ORDER — LABETALOL HYDROCHLORIDE 5 MG/ML
10 INJECTION, SOLUTION INTRAVENOUS
Status: DISCONTINUED | OUTPATIENT
Start: 2025-06-13 | End: 2025-06-13 | Stop reason: HOSPADM

## 2025-06-13 RX ORDER — HYDROCODONE BITARTRATE AND ACETAMINOPHEN 5; 325 MG/1; MG/1
2 TABLET ORAL EVERY 4 HOURS PRN
Status: DISCONTINUED | OUTPATIENT
Start: 2025-06-13 | End: 2025-06-14

## 2025-06-13 RX ORDER — DIPHENHYDRAMINE HYDROCHLORIDE 50 MG/ML
12.5 INJECTION, SOLUTION INTRAMUSCULAR; INTRAVENOUS
Status: COMPLETED | OUTPATIENT
Start: 2025-06-13 | End: 2025-06-13

## 2025-06-13 RX ORDER — SODIUM CHLORIDE, SODIUM LACTATE, POTASSIUM CHLORIDE, CALCIUM CHLORIDE 600; 310; 30; 20 MG/100ML; MG/100ML; MG/100ML; MG/100ML
INJECTION, SOLUTION INTRAVENOUS CONTINUOUS
Status: DISCONTINUED | OUTPATIENT
Start: 2025-06-13 | End: 2025-06-14 | Stop reason: HOSPADM

## 2025-06-13 RX ORDER — NALOXONE HYDROCHLORIDE 0.4 MG/ML
INJECTION, SOLUTION INTRAMUSCULAR; INTRAVENOUS; SUBCUTANEOUS PRN
Status: DISCONTINUED | OUTPATIENT
Start: 2025-06-13 | End: 2025-06-13 | Stop reason: HOSPADM

## 2025-06-13 RX ORDER — PHENYLEPHRINE HCL IN 0.9% NACL 1 MG/10 ML
SYRINGE (ML) INTRAVENOUS
Status: DISCONTINUED | OUTPATIENT
Start: 2025-06-13 | End: 2025-06-13 | Stop reason: SDUPTHER

## 2025-06-13 RX ORDER — AMLODIPINE BESYLATE 10 MG/1
5 TABLET ORAL DAILY
Status: DISCONTINUED | OUTPATIENT
Start: 2025-06-14 | End: 2025-06-14 | Stop reason: HOSPADM

## 2025-06-13 RX ORDER — SODIUM CHLORIDE 0.9 % (FLUSH) 0.9 %
5-40 SYRINGE (ML) INJECTION PRN
Status: DISCONTINUED | OUTPATIENT
Start: 2025-06-13 | End: 2025-06-13 | Stop reason: HOSPADM

## 2025-06-13 RX ORDER — SODIUM CHLORIDE 0.9 % (FLUSH) 0.9 %
5-40 SYRINGE (ML) INJECTION PRN
Status: DISCONTINUED | OUTPATIENT
Start: 2025-06-13 | End: 2025-06-14 | Stop reason: HOSPADM

## 2025-06-13 RX ORDER — FENTANYL CITRATE 50 UG/ML
INJECTION, SOLUTION INTRAMUSCULAR; INTRAVENOUS
Status: DISCONTINUED | OUTPATIENT
Start: 2025-06-13 | End: 2025-06-13 | Stop reason: SDUPTHER

## 2025-06-13 RX ORDER — DEXAMETHASONE SODIUM PHOSPHATE 10 MG/ML
INJECTION, SOLUTION INTRA-ARTICULAR; INTRALESIONAL; INTRAMUSCULAR; INTRAVENOUS; SOFT TISSUE
Status: DISCONTINUED | OUTPATIENT
Start: 2025-06-13 | End: 2025-06-13 | Stop reason: SDUPTHER

## 2025-06-13 RX ORDER — SENNOSIDES 8.6 MG/1
1 TABLET ORAL DAILY PRN
Status: DISCONTINUED | OUTPATIENT
Start: 2025-06-13 | End: 2025-06-14 | Stop reason: HOSPADM

## 2025-06-13 RX ORDER — ONDANSETRON 2 MG/ML
INJECTION INTRAMUSCULAR; INTRAVENOUS
Status: DISCONTINUED | OUTPATIENT
Start: 2025-06-13 | End: 2025-06-13 | Stop reason: SDUPTHER

## 2025-06-13 RX ORDER — DIPHENHYDRAMINE HYDROCHLORIDE 50 MG/ML
25 INJECTION, SOLUTION INTRAMUSCULAR; INTRAVENOUS EVERY 6 HOURS PRN
Status: DISCONTINUED | OUTPATIENT
Start: 2025-06-13 | End: 2025-06-14 | Stop reason: HOSPADM

## 2025-06-13 RX ORDER — MIDAZOLAM HYDROCHLORIDE 1 MG/ML
INJECTION, SOLUTION INTRAMUSCULAR; INTRAVENOUS
Status: DISCONTINUED | OUTPATIENT
Start: 2025-06-13 | End: 2025-06-13 | Stop reason: SDUPTHER

## 2025-06-13 RX ORDER — HYDROMORPHONE HYDROCHLORIDE 2 MG/1
1 TABLET ORAL EVERY 4 HOURS PRN
Status: DISCONTINUED | OUTPATIENT
Start: 2025-06-13 | End: 2025-06-13

## 2025-06-13 RX ORDER — LEVETIRACETAM 500 MG/1
500 TABLET ORAL 2 TIMES DAILY
Status: DISCONTINUED | OUTPATIENT
Start: 2025-06-13 | End: 2025-06-14 | Stop reason: HOSPADM

## 2025-06-13 RX ORDER — HYDROMORPHONE HYDROCHLORIDE 2 MG/1
2 TABLET ORAL EVERY 4 HOURS PRN
Status: DISCONTINUED | OUTPATIENT
Start: 2025-06-13 | End: 2025-06-13

## 2025-06-13 RX ADMIN — MIDAZOLAM HYDROCHLORIDE 2 MG: 1 INJECTION, SOLUTION INTRAMUSCULAR; INTRAVENOUS at 09:35

## 2025-06-13 RX ADMIN — DEXAMETHASONE SODIUM PHOSPHATE 5 MG: 10 INJECTION INTRAMUSCULAR; INTRAVENOUS at 11:01

## 2025-06-13 RX ADMIN — PROCHLORPERAZINE EDISYLATE 5 MG: 5 INJECTION INTRAMUSCULAR; INTRAVENOUS at 13:25

## 2025-06-13 RX ADMIN — MIDAZOLAM 2 MG: 1 INJECTION INTRAMUSCULAR; INTRAVENOUS at 10:08

## 2025-06-13 RX ADMIN — Medication 2000 MG: at 18:01

## 2025-06-13 RX ADMIN — ONDANSETRON 4 MG: 2 INJECTION, SOLUTION INTRAMUSCULAR; INTRAVENOUS at 16:12

## 2025-06-13 RX ADMIN — Medication 5 MG: at 13:08

## 2025-06-13 RX ADMIN — PROPOFOL 150 MG: 10 INJECTION, EMULSION INTRAVENOUS at 10:14

## 2025-06-13 RX ADMIN — Medication 5 MG: at 12:52

## 2025-06-13 RX ADMIN — FENTANYL CITRATE 50 MCG: 50 INJECTION, SOLUTION INTRAMUSCULAR; INTRAVENOUS at 10:56

## 2025-06-13 RX ADMIN — HYDROMORPHONE HYDROCHLORIDE 0.5 MG: 1 INJECTION, SOLUTION INTRAMUSCULAR; INTRAVENOUS; SUBCUTANEOUS at 13:24

## 2025-06-13 RX ADMIN — Medication 2.5 MG: at 12:45

## 2025-06-13 RX ADMIN — SODIUM CHLORIDE, POTASSIUM CHLORIDE, SODIUM LACTATE AND CALCIUM CHLORIDE: 600; 310; 30; 20 INJECTION, SOLUTION INTRAVENOUS at 10:05

## 2025-06-13 RX ADMIN — ONDANSETRON 4 MG: 2 INJECTION, SOLUTION INTRAMUSCULAR; INTRAVENOUS at 09:35

## 2025-06-13 RX ADMIN — ROCURONIUM BROMIDE 20 MG: 10 INJECTION, SOLUTION INTRAVENOUS at 10:54

## 2025-06-13 RX ADMIN — PROPOFOL 25 MCG/KG/MIN: 10 INJECTION, EMULSION INTRAVENOUS at 10:25

## 2025-06-13 RX ADMIN — FENTANYL CITRATE 50 MCG: 50 INJECTION, SOLUTION INTRAMUSCULAR; INTRAVENOUS at 12:11

## 2025-06-13 RX ADMIN — FENTANYL CITRATE 50 MCG: 50 INJECTION, SOLUTION INTRAMUSCULAR; INTRAVENOUS at 10:13

## 2025-06-13 RX ADMIN — DIPHENHYDRAMINE HYDROCHLORIDE 25 MG: 50 INJECTION, SOLUTION INTRAMUSCULAR; INTRAVENOUS at 22:20

## 2025-06-13 RX ADMIN — SODIUM CHLORIDE, SODIUM LACTATE, POTASSIUM CHLORIDE, AND CALCIUM CHLORIDE: .6; .31; .03; .02 INJECTION, SOLUTION INTRAVENOUS at 16:11

## 2025-06-13 RX ADMIN — FENTANYL CITRATE 50 MCG: 50 INJECTION, SOLUTION INTRAMUSCULAR; INTRAVENOUS at 12:25

## 2025-06-13 RX ADMIN — LEVETIRACETAM 500 MG: 500 TABLET, FILM COATED ORAL at 20:20

## 2025-06-13 RX ADMIN — HYDROCODONE BITARTRATE AND ACETAMINOPHEN 2 TABLET: 5; 325 TABLET ORAL at 21:09

## 2025-06-13 RX ADMIN — ROCURONIUM BROMIDE 50 MG: 10 INJECTION, SOLUTION INTRAVENOUS at 10:15

## 2025-06-13 RX ADMIN — PANTOPRAZOLE SODIUM 40 MG: 40 TABLET, DELAYED RELEASE ORAL at 16:11

## 2025-06-13 RX ADMIN — ONDANSETRON 4 MG: 2 INJECTION, SOLUTION INTRAMUSCULAR; INTRAVENOUS at 22:05

## 2025-06-13 RX ADMIN — ROCURONIUM BROMIDE 10 MG: 10 INJECTION, SOLUTION INTRAVENOUS at 11:19

## 2025-06-13 RX ADMIN — LIDOCAINE HYDROCHLORIDE 50 MG: 10 INJECTION, SOLUTION EPIDURAL; INFILTRATION; INTRACAUDAL; PERINEURAL at 10:13

## 2025-06-13 RX ADMIN — NICARDIPINE HYDROCHLORIDE 5 MG/HR: 0.1 INJECTION INTRAVENOUS at 13:45

## 2025-06-13 RX ADMIN — HYDROMORPHONE HYDROCHLORIDE 0.5 MG: 1 INJECTION, SOLUTION INTRAMUSCULAR; INTRAVENOUS; SUBCUTANEOUS at 14:33

## 2025-06-13 RX ADMIN — SODIUM CHLORIDE, PRESERVATIVE FREE 10 ML: 5 INJECTION INTRAVENOUS at 20:21

## 2025-06-13 RX ADMIN — PROPOFOL 50 MG: 10 INJECTION, EMULSION INTRAVENOUS at 10:15

## 2025-06-13 RX ADMIN — ONDANSETRON 4 MG: 2 INJECTION, SOLUTION INTRAMUSCULAR; INTRAVENOUS at 12:49

## 2025-06-13 RX ADMIN — HYDROCODONE BITARTRATE AND ACETAMINOPHEN 2 TABLET: 5; 325 TABLET ORAL at 16:11

## 2025-06-13 RX ADMIN — Medication 50 MCG: at 11:03

## 2025-06-13 RX ADMIN — FAMOTIDINE 20 MG: 10 INJECTION, SOLUTION INTRAVENOUS at 09:38

## 2025-06-13 RX ADMIN — Medication 2.5 MG: at 12:51

## 2025-06-13 RX ADMIN — ROCURONIUM BROMIDE 10 MG: 10 INJECTION, SOLUTION INTRAVENOUS at 11:11

## 2025-06-13 RX ADMIN — MIRTAZAPINE 15 MG: 15 TABLET, FILM COATED ORAL at 20:20

## 2025-06-13 RX ADMIN — Medication 50 MCG: at 11:07

## 2025-06-13 RX ADMIN — SUGAMMADEX 200 MG: 100 INJECTION, SOLUTION INTRAVENOUS at 12:49

## 2025-06-13 RX ADMIN — MANNITOL 74.8 G: 20 INJECTION, SOLUTION INTRAVENOUS at 10:37

## 2025-06-13 RX ADMIN — DIPHENHYDRAMINE HYDROCHLORIDE 12.5 MG: 50 INJECTION INTRAMUSCULAR; INTRAVENOUS at 13:23

## 2025-06-13 RX ADMIN — CEFAZOLIN 2 G: 1 INJECTION, POWDER, FOR SOLUTION INTRAMUSCULAR; INTRAVENOUS at 10:45

## 2025-06-13 ASSESSMENT — PAIN SCALES - GENERAL
PAINLEVEL_OUTOF10: 4
PAINLEVEL_OUTOF10: 4
PAINLEVEL_OUTOF10: 8
PAINLEVEL_OUTOF10: 7
PAINLEVEL_OUTOF10: 5
PAINLEVEL_OUTOF10: 4
PAINLEVEL_OUTOF10: 8
PAINLEVEL_OUTOF10: 4
PAINLEVEL_OUTOF10: 4
PAINLEVEL_OUTOF10: 7
PAINLEVEL_OUTOF10: 4
PAINLEVEL_OUTOF10: 8
PAINLEVEL_OUTOF10: 8

## 2025-06-13 ASSESSMENT — PAIN DESCRIPTION - ORIENTATION
ORIENTATION: LEFT

## 2025-06-13 ASSESSMENT — PAIN DESCRIPTION - DESCRIPTORS
DESCRIPTORS: ACHING;SORE
DESCRIPTORS: ACHING;THROBBING
DESCRIPTORS: ACHING;SORE
DESCRIPTORS: ACHING
DESCRIPTORS: ACHING;SORE
DESCRIPTORS: ACHING;SORE
DESCRIPTORS: THROBBING
DESCRIPTORS: ACHING
DESCRIPTORS: ACHING
DESCRIPTORS: ACHING;SORE

## 2025-06-13 ASSESSMENT — PAIN DESCRIPTION - LOCATION
LOCATION: HEAD
LOCATION: INCISION
LOCATION: HEAD

## 2025-06-13 ASSESSMENT — PAIN DESCRIPTION - PAIN TYPE
TYPE: ACUTE PAIN;SURGICAL PAIN
TYPE: ACUTE PAIN;SURGICAL PAIN

## 2025-06-13 ASSESSMENT — PAIN - FUNCTIONAL ASSESSMENT
PAIN_FUNCTIONAL_ASSESSMENT: ACTIVITIES ARE NOT PREVENTED
PAIN_FUNCTIONAL_ASSESSMENT: ACTIVITIES ARE NOT PREVENTED
PAIN_FUNCTIONAL_ASSESSMENT: 0-10

## 2025-06-13 NOTE — OP NOTE
room.  She was placed under general anesthesia placed on the regular bed with the left sided shoulder bump and head turned to the right side approximate 60 degrees and placed on the horseshoe headrest.  Left hemicranium was shaved and the incision was marked out.  After sterile prep and draping and timeout was performed I infiltrated the incision with 1% lidocaine with epinephrine.  10 blade was used to make incision followed by Bovie to take it all way down to the bone.  Gentle dissection using a combination of Metzenbaums and scissors as well as Bovie was used to identify the plane just above the DuraGen preserving the paracranium we are able.  Small areas of defect were noted where visualization of the cortex was inevitable.  Otherwise I was able to maintain the plane carefully and circumferentially identify the bone edges.  The flap was held in place with fishhooks.  Any bony bleeding was controlled with bone wax as well as Gelfoam.  I noted that there was a relative amount of fullness of the brain upon reflection of the scalp.  I utilized the ultrasound and placed an external ventricular drain in the left temporal horn draining off approximately 15 to 20 cc of CSF in addition to utilizing mannitol by anesthesia.  This allowed for significant relaxation of the brain at which point I was able to fix her autologous bone graft again using 3 bur hole covers.  Multiple rounds of irrigation with Irrisept were performed and vancomycin was sprinkled on the inner and outer table as well as along the bur hole covers.  After the bone flap was affixed hemostasis was obtained and multiple rounds of irrigation were again performed.  The EVD was not kept in the brain but removed prior to replacement of the bone flap.  Vancomycin was placed over the metal as well as the outer table as well.  7 flat drain was tunneled below the galea and secured with a drain stitch.  The wound was reapproximated using interrupted 2-0 Vicryl for  a galea combined with temporalis fascia layer as well as a running Monocryl baseball stitch with Dermabond.  A drain stitch was also applied.  The patient was awoken in stable condition and returned to the PACU.  Brian marie physicians assistant assisted with all portions of the case including positioning exposure implantation of the bone flap and closure of the wound.    Electronically signed by Amanda Palmer DO on 6/13/2025 at 1:10 PM

## 2025-06-13 NOTE — ANESTHESIA PRE PROCEDURE
([facility]) 2021   • GERD (gastroesophageal reflux disease)    • Glioblastoma multiforme ([facility]) 05/2025   • H/O headache    • Hypertension    • Intracranial hemorrhage ([facility]) 04/2025   • Nausea    • PONV (postoperative nausea and vomiting)     requests to have scopolamine patch prior to surgieries if allowed   • Ulcer of gastric fundus    • Under care of service provider     pcp-harley berman-last visit may 2025   • Under care of service provider     radiology oncology-vaughn williamson-[facility]   • Under care of service provider     hem/onc-leslie cruz-[facility]   • Under care of service provider     neurosurg-amanda palmer       Past Surgical History:        Procedure Laterality Date   • CLEFT LIP REPAIR     • COSMETIC SURGERY      rinoplasty   • CRANIOTOMY Left 04/10/2025    LEFT CRANIECTOMY FOR HEMATOMA performed by Amanda Palmer DO at [facility]   • HYSTERECTOMY (CERVIX STATUS UNKNOWN)     • KNEE ARTHROSCOPY  02/2020   • OVARY REMOVAL Left    • TUBAL LIGATION Bilateral 20134   • UPPER GASTROINTESTINAL ENDOSCOPY  12/02/2015   • UPPER GASTROINTESTINAL ENDOSCOPY N/A 05/07/2021    EGD BIOPSY performed by Ruddy Ivan MD at [facility]   • UPPER GASTROINTESTINAL ENDOSCOPY N/A 08/03/2021    EGD BIOPSY performed by [facility]ado MD at [facility]       Social History:    Social History     Tobacco Use   • Smoking status: Never   • Smokeless tobacco: Never   Substance Use Topics   • Alcohol use: No                                Counseling given: Not Answered      Vital Signs (Current):   Vitals:    06/12/25 1546   Weight: 74.8 kg (165 lb)   Height: 1.702 m (5' 7\")                                              BP Readings from Last 3 Encounters:   06/11/25 124/87   05/04/25 123/85   04/30/25 (!) 119/93       NPO Status: Time of last liquid consumption: 1900                        Time of last solid consumption: 1900                        Date of last liquid consumption: 06/12/25                        Date of last solid

## 2025-06-13 NOTE — PROGRESS NOTES
Neurosurgery Post op Progress Note      SUBJECTIVE: Status post: 1.  Left temporal cranioplasty.    Patient seen while in PACU.  Patient appears to be alert and oriented.  Patient's answers all questions asked to her appropriately.  Her pupils appear to be equal and reactive.  No double vision.  She is able to move all 4 extremities upon command.  She is having moderate complaints of pain at the surgical site.  She is having some nausea but no emesis.  She does have a slight headache.      OBJECTIVE      Physical exam   VITALS:    Vitals:    06/13/25 1330   BP: (!) 144/110   Pulse: (!) 102   Resp: 14   Temp:    SpO2: 99%     INTAKE:    Intake/Output Summary (Last 24 hours) at 6/13/2025 1336  Last data filed at 6/13/2025 1310  Gross per 24 hour   Intake 1275 ml   Output 350 ml   Net 925 ml     URINARY CATHETER OUTPUT (Celaya): No Celaya catheter.     DRAIN/TUBE OUTPUT: Subgaleal RODOLFO drain present.  Drain appears to be holding suction and working.  Output appears to be approximately 25 mL of red blood in reservoir.   No evidence of DVT seen on physical exam.    Neurological exam reveals Awake and alert, Responds to voice, and Responds to tactile stimuli  alert, oriented x3, affect appropriate, moves all extremities well, and no involuntary movements  cranial nerves II through XII intact, motor and sensory grossly normal bilaterally, normal muscle tone, no tremors, strength 5/5.   the upper and lower extremities normal 5/5 strength in all tested muscle groups        Wound   Post op wound: Left temporal area, of skull.      well approximated incision clean, dry, and no drainage Closed w/ continuous Monocryl with Dermabond.  No dressing over incision site.    Data      LABS:   Lab Results   Component Value Date    WBC 3.8 05/04/2025    HGB 11.2 (L) 05/04/2025    HCT 33.8 (L) 05/04/2025    MCV 85.6 05/04/2025     05/04/2025      Lab Results   Component Value Date     05/04/2025    K 3.7 05/04/2025

## 2025-06-13 NOTE — H&P
SKIN:  Warm and dry, no rashes to exposed areas of skin.   HEAD:  Normocephalic, atraumatic. Surgical scar to left side of scalp.  EYES: PERRL.  EOMs intact. Wearing glasses.   EARS:  Intact and equal bilaterally.  No edema or thickening, without lumps, lesions, or discharge. Hearing grossly WNL.    NOSE:  Nares patent.  No rhinorrhea.   MOUTH/THROAT:  Mucous membranes pink and moist, uvula midline, teeth appear to be intact.  NECK: Supple, no lymphadenopathy.  LUNGS: Respirations even and non-labored. Clear to auscultation bilaterally, no wheezes, rales, or rhonchi.    CARDIOVASCULAR: Regular rate and rhythm, no murmurs/rubs/gallops.   ABDOMEN: soft, non-tender and non-distended, bowel sounds active x 4.   EXTREMITIES: No edema to bilateral lower extremities.  No varicosities to bilateral lower extremities.   NEUROLOGIC: CN II-XII are grossly intact. Gait not assessed.   IMPRESSIONS:   GBM (glioblastoma multiforme) (HCC), Nontraumatic subcortical hemorrhage of left cerebral hemisphere (HCC), Absence of bone of skull.  PLANS:   CRANIOPLASTY (SUGITA, SUPINE, HORSESHOE HEADREST, PATIENT'S BONE FLAP) - Left.    ALEX Price - CNP   Electronically signed 6/13/2025 at 9:22 AM

## 2025-06-13 NOTE — H&P
Neuro ICU History & Physical    Patient Name: Jack Yu  Patient : 1985  Room/Bed: STVZ OR POOL RM/NONE  Code Status: Full  Allergies:   Allergies   Allergen Reactions    Latex Itching    Oxycodone-Acetaminophen Itching     Vomiting    Reglan [Metoclopramide] Anxiety       CHIEF COMPLAINT     GBM (glioblastoma multiforme)    HPI    History Obtained From: Patient and chart review    The patient is a 39 y.o. female with a past medical history significant for anxiety, paroxysmal atrial fibrillation/flutter, CVA, GERD, hypertension presented to the neuro ICU status post left temporal cranioplasty with neurosurgery for glioblastoma multiforme. Patient noted to have recent left craniectomy for evacuation of intracerebral hematoma with tumor in April 10, 2025.      Patient initially presented in 2025 for slurring of speech with flaccidity followed by being found down.  Patient was intubated for airway protection and started on a Cardene drip for elevated blood pressure greater than 170.  Patient had a repeat CT status post left-sided craniectomy which showed decreased mass effect.  Patient began to have ongoing expressive aphasia status post surgical intervention.    Admitted to ICU From: PACU   Reason for ICU Admission: Post op management       PATIENT HISTORY   Past Medical History:        Diagnosis Date    Anxiety     Atrial fib/flutter, transient (HCC)     CVA (cerebral vascular accident) (HCC)     GERD (gastroesophageal reflux disease)     Glioblastoma multiforme (HCC) 2025    H/O headache     Hypertension     Intracranial hemorrhage (HCC) 2025    Nausea     PONV (postoperative nausea and vomiting)     requests to have scopolamine patch prior to surgieries if allowed    Thyroid nodule     Ulcer of gastric fundus     Under care of service provider     pcp-harley berman-last visit may 2025    Under care of service provider     radiology oncology-vaughn williamson-[facility]     05/04/2025 03:30 PM    GLUCOSE 98 09/11/2024 09:14 AM       RADIOLOGY:   CT Head wo Contrast    (Results Pending)             Labs and Images reviewed with:    [] Cara Woodard MD  [x] Deyvi Mobley MD  [] there are no new interval images to review.     ASSESSMENT AND PLAN:         ASSESSMENT:     This is a 39 y.o. female status post left temporal cranioplasty with neurosurgery for glioblastoma multiforme.    Patient care will be discussed with attending, will reevaluate patient along with attending.     PLAN/MEDICAL DECISION MAKING:      NEUROLOGIC:  - Status post cranioplasty for GBM with subgaleal drain  -Fioricet for headache every 8 hourly  - Keppra 500 mg twice daily  - Goal -150  - Neuro checks per protocol    CARDIOVASCULAR:  - Goal -150  -On Cardene gtt.  -Amlodipine 5 Mg daily (home medicine)  - Continue telemetry    PULMONARY:  - On room air, maintaining sats greater than 92%  - Continue to monitor.    RENAL/FLUID/ELECTROLYTE:  - BMP in progress, will follow-up  - Will monitor input/output  - IVF: LR at 125 cc/h  - Replace electrolytes PRN  - Daily BMP    GI/NUTRITION:  NUTRITION:  ADULT DIET; Clear Liquid  - Bowel regimen: GlycoLax 17 g once daily  - GI prophylaxis: Pantoprazole 40 Mg    ID:  - Tmax 36.9  -Cefazoling 2g Q8 x 24hrs  - Continue to monitor for fevers  - Daily CBC    HEME:   -CBC in progress, will follow-up  - Daily CBC    ENDOCRINE:  - Continue to monitor blood glucose, goal <180      OTHER:  - PT/OT/ST     PROPHYLAXIS:  Stress ulcer: PPI    DVT PROPHYLAXIS:  - SCD sleeves - Thigh High     DISPOSITION: Remain in ICU      Tariq Cameron MD  Neuro Critical Care Service   6/13/2025     1:36 PM

## 2025-06-13 NOTE — ANESTHESIA POSTPROCEDURE EVALUATION
Department of Anesthesiology  Postprocedure Note    Patient: Jack Yu  MRN: [id number]  YOB: 1985  Date of evaluation: 6/13/2025    Procedure Summary       Date: 06/13/25 Room / Location: [address]    Anesthesia Start: 1004 Anesthesia Stop: 1316    Procedure: LEFT TEMPORAL CRANIOPLASTY (Left: Head) Diagnosis:       GBM (glioblastoma multiforme) (HCC)      Nontraumatic subcortical hemorrhage of left cerebral hemisphere (HCC)      Absence of bone of skull      (GBM (glioblastoma multiforme) (HCC) [C71.9])      (Nontraumatic subcortical hemorrhage of left cerebral hemisphere (HCC) [I61.0])      (Absence of bone of skull [Q75.8])    Surgeons: Amanda Palmer DO Responsible Provider: Kerri Dixon MD    Anesthesia Type: general ASA Status: 3            Anesthesia Type: No value filed.    Tu Phase I: Tu Score: 9    Tu Phase II:      Anesthesia Post Evaluation    Patient location during evaluation: bedside  Patient participation: complete - patient participated  Level of consciousness: awake and awake and alert  Pain score: 2  Airway patency: patent  Nausea & Vomiting: no nausea and no vomiting  Cardiovascular status: blood pressure returned to baseline and hemodynamically stable  Respiratory status: acceptable  Hydration status: euvolemic  Pain management: adequate        No notable events documented.

## 2025-06-14 ENCOUNTER — APPOINTMENT (OUTPATIENT)
Dept: CT IMAGING | Age: 40
DRG: 025 | End: 2025-06-14
Attending: NEUROLOGICAL SURGERY
Payer: COMMERCIAL

## 2025-06-14 VITALS
RESPIRATION RATE: 18 BRPM | WEIGHT: 165 LBS | HEART RATE: 114 BPM | BODY MASS INDEX: 25.9 KG/M2 | TEMPERATURE: 98.2 F | HEIGHT: 67 IN | OXYGEN SATURATION: 97 % | SYSTOLIC BLOOD PRESSURE: 121 MMHG | DIASTOLIC BLOOD PRESSURE: 76 MMHG

## 2025-06-14 PROBLEM — C71.9 GBM (GLIOBLASTOMA MULTIFORME) (HCC): Status: ACTIVE | Noted: 2025-06-14

## 2025-06-14 PROCEDURE — 97530 THERAPEUTIC ACTIVITIES: CPT

## 2025-06-14 PROCEDURE — 6360000002 HC RX W HCPCS: Performed by: PHYSICIAN ASSISTANT

## 2025-06-14 PROCEDURE — 97161 PT EVAL LOW COMPLEX 20 MIN: CPT

## 2025-06-14 PROCEDURE — 6360000002 HC RX W HCPCS

## 2025-06-14 PROCEDURE — 6370000000 HC RX 637 (ALT 250 FOR IP): Performed by: PHYSICIAN ASSISTANT

## 2025-06-14 PROCEDURE — 97165 OT EVAL LOW COMPLEX 30 MIN: CPT

## 2025-06-14 PROCEDURE — 6370000000 HC RX 637 (ALT 250 FOR IP): Performed by: REGISTERED NURSE

## 2025-06-14 PROCEDURE — 99253 IP/OBS CNSLTJ NEW/EST LOW 45: CPT | Performed by: PSYCHIATRY & NEUROLOGY

## 2025-06-14 PROCEDURE — 70450 CT HEAD/BRAIN W/O DYE: CPT

## 2025-06-14 PROCEDURE — 2500000003 HC RX 250 WO HCPCS: Performed by: PHYSICIAN ASSISTANT

## 2025-06-14 PROCEDURE — 97535 SELF CARE MNGMENT TRAINING: CPT

## 2025-06-14 PROCEDURE — 6370000000 HC RX 637 (ALT 250 FOR IP)

## 2025-06-14 RX ORDER — DIPHENHYDRAMINE HYDROCHLORIDE 50 MG/ML
25 INJECTION, SOLUTION INTRAMUSCULAR; INTRAVENOUS ONCE
Status: COMPLETED | OUTPATIENT
Start: 2025-06-14 | End: 2025-06-14

## 2025-06-14 RX ORDER — HYDROCODONE BITARTRATE AND ACETAMINOPHEN 5; 325 MG/1; MG/1
1 TABLET ORAL EVERY 6 HOURS PRN
Qty: 12 TABLET | Refills: 0 | Status: SHIPPED | OUTPATIENT
Start: 2025-06-14 | End: 2025-06-17

## 2025-06-14 RX ADMIN — ONDANSETRON 4 MG: 2 INJECTION, SOLUTION INTRAMUSCULAR; INTRAVENOUS at 11:35

## 2025-06-14 RX ADMIN — Medication 2000 MG: at 11:35

## 2025-06-14 RX ADMIN — DIPHENHYDRAMINE HYDROCHLORIDE 25 MG: 50 INJECTION INTRAMUSCULAR; INTRAVENOUS at 08:29

## 2025-06-14 RX ADMIN — Medication 2000 MG: at 03:57

## 2025-06-14 RX ADMIN — SODIUM CHLORIDE, PRESERVATIVE FREE 10 ML: 5 INJECTION INTRAVENOUS at 08:27

## 2025-06-14 RX ADMIN — LEVETIRACETAM 500 MG: 500 TABLET, FILM COATED ORAL at 08:25

## 2025-06-14 RX ADMIN — HYDROMORPHONE HYDROCHLORIDE 0.5 MG: 1 INJECTION, SOLUTION INTRAMUSCULAR; INTRAVENOUS; SUBCUTANEOUS at 11:35

## 2025-06-14 RX ADMIN — HYDROCODONE BITARTRATE AND ACETAMINOPHEN 2 TABLET: 5; 325 TABLET ORAL at 05:03

## 2025-06-14 RX ADMIN — ONDANSETRON 4 MG: 2 INJECTION, SOLUTION INTRAMUSCULAR; INTRAVENOUS at 05:06

## 2025-06-14 RX ADMIN — DIPHENHYDRAMINE HYDROCHLORIDE 25 MG: 50 INJECTION, SOLUTION INTRAMUSCULAR; INTRAVENOUS at 05:59

## 2025-06-14 RX ADMIN — PANTOPRAZOLE SODIUM 40 MG: 40 TABLET, DELAYED RELEASE ORAL at 08:26

## 2025-06-14 RX ADMIN — AMLODIPINE BESYLATE 5 MG: 10 TABLET ORAL at 08:26

## 2025-06-14 ASSESSMENT — PAIN SCALES - GENERAL
PAINLEVEL_OUTOF10: 6
PAINLEVEL_OUTOF10: 7
PAINLEVEL_OUTOF10: 9
PAINLEVEL_OUTOF10: 4

## 2025-06-14 ASSESSMENT — PAIN DESCRIPTION - PAIN TYPE: TYPE: ACUTE PAIN;SURGICAL PAIN

## 2025-06-14 ASSESSMENT — PAIN DESCRIPTION - DESCRIPTORS: DESCRIPTORS: ACHING;THROBBING

## 2025-06-14 ASSESSMENT — PAIN DESCRIPTION - LOCATION: LOCATION: HEAD

## 2025-06-14 ASSESSMENT — PAIN - FUNCTIONAL ASSESSMENT: PAIN_FUNCTIONAL_ASSESSMENT: ACTIVITIES ARE NOT PREVENTED

## 2025-06-14 ASSESSMENT — PAIN DESCRIPTION - ORIENTATION: ORIENTATION: LEFT;OUTER

## 2025-06-14 NOTE — CARE COORDINATION
Case Management Assessment  Initial Evaluation    Date/Time of Evaluation: 6/14/2025 12:26 PM  Assessment Completed by: DANISHA SOLOMON    If patient is discharged prior to next notation, then this note serves as note for discharge by case management.    Patient Name: Jack Yu                   YOB: 1985  Diagnosis: GBM (glioblastoma multiforme) (HCC) [C71.9]  Nontraumatic subcortical hemorrhage of left cerebral hemisphere (HCC) [I61.0]  Absence of bone of skull [Q75.8]                   Date / Time: 6/13/2025  8:43 AM    Patient Admission Status: Inpatient   Readmission Risk (Low < 19, Mod (19-27), High > 27): Readmission Risk Score: 13.7    Current PCP: Debbie Villareal MD  PCP verified by CM? Yes    Chart Reviewed: Yes      History Provided by: Patient  Patient Orientation: Alert and Oriented    Patient Cognition: Alert    Hospitalization in the last 30 days (Readmission):  No    If yes, Readmission Assessment in CM Navigator will be completed.    Advance Directives:      Code Status: Full Code   Patient's Primary Decision Maker is: Legal Next of Kin      Discharge Planning:    Patient lives with: (P) Children Type of Home: (P) House  Primary Care Giver: Self  Patient Support Systems include: Parent, Children, Family Members   Current Financial resources: (P) None  Current community resources: (P) None  Current services prior to admission: (P) None            Current DME:              Type of Home Care services:  (P) None    ADLS  Prior functional level: (P) Independent in ADLs/IADLs  Current functional level: (P) Assistance with the following:, Housework, Shopping    PT AM-PAC:   /24  OT AM-PAC:   /24    Family can provide assistance at DC: (P) Yes  Would you like Case Management to discuss the discharge plan with any other family members/significant others, and if so, who? (P) No  Plans to Return to Present Housing: (P) Yes  Other Identified Issues/Barriers to RETURNING to current

## 2025-06-14 NOTE — PLAN OF CARE
Problem: Discharge Planning  Goal: Discharge to home or other facility with appropriate resources  Outcome: Progressing  Flowsheets (Taken 6/13/2025 2110)  Discharge to home or other facility with appropriate resources:   Identify barriers to discharge with patient and caregiver   Arrange for needed discharge resources and transportation as appropriate   Identify discharge learning needs (meds, wound care, etc)   Arrange for interpreters to assist at discharge as needed   Refer to discharge planning if patient needs post-hospital services based on physician order or complex needs related to functional status, cognitive ability or social support system     Problem: Pain  Goal: Verbalizes/displays adequate comfort level or baseline comfort level  Outcome: Progressing  Flowsheets (Taken 6/13/2025 2109)  Verbalizes/displays adequate comfort level or baseline comfort level:   Encourage patient to monitor pain and request assistance   Assess pain using appropriate pain scale   Administer analgesics based on type and severity of pain and evaluate response   Implement non-pharmacological measures as appropriate and evaluate response   Consider cultural and social influences on pain and pain management   Notify Licensed Independent Practitioner if interventions unsuccessful or patient reports new pain     Problem: Chronic Conditions and Co-morbidities  Goal: Patient's chronic conditions and co-morbidity symptoms are monitored and maintained or improved  Outcome: Progressing  Flowsheets (Taken 6/13/2025 2110)  Care Plan - Patient's Chronic Conditions and Co-Morbidity Symptoms are Monitored and Maintained or Improved:   Monitor and assess patient's chronic conditions and comorbid symptoms for stability, deterioration, or improvement   Collaborate with multidisciplinary team to address chronic and comorbid conditions and prevent exacerbation or deterioration   Update acute care plan with appropriate goals if chronic or

## 2025-06-14 NOTE — DISCHARGE INSTRUCTIONS
Cranioplasty Discharge Instructions     Thank you for choosing [facility] and [facility] for your surgical needs. The following instructions will help to ensure your comfort and that you are well prepared after your surgery.       Post-Operative Visit:   The office is located at:    [facility]    [facility] [address], [address], [place] [address]    [phone]     Please also call your primary care physician to schedule an appointment for further evaluation and care.       Diet:   You may resume your regular diet   Be sure to eat a well-balanced diet. Protein promotes wound healing.   Pain medication and decreased activity can cause constipation. Drink 8-10 glasses of water a day, eat fresh fruits and vegetables, and add prunes, raisins and bran cereals to your diet if you do become constipated. A stool softener taken 1-2 times a day is helpful. Dulcolax suppositories or Fleets enemas are also available without a prescription. Call our office if the problem continues.     Activity and Exercise:   No lifting greater than 5 pounds (gallon of milk) for the first few weeks after surgery.   No driving until you are seen in the office. If you have had a seizure or have visual deficits, you may not drive until cleared by a neurologist.   Avoid riding in a car for the first two weeks until you come to the office for your scheduled follow-up.   Start taking short, frequent walks in the beginning. [place], more frequent walks throughout the day are more beneficial than one long walk each day. You may gradually increase the distance; as tolerated.   If your pain increases, you may be walking too much or too far. Try backing off for a day or two and then resume slowly.   No baths, swimming or hot tub until you discuss this with your doctor.     Incision Care and Hygiene:   Your incision is closed with staples or sutures and  should be removed about 2 weeks after surgery. If they are not removed please call the clinic to have them removed.  It is OK to shower 3-4 days after surgery. Let water run over the incision. Gently pat the incision dry with a clean towel, do not rub. Leave incision site open to air.   Do not apply ointments or lotions to the incision site.    Pain Management:   Do not take NSAID medications (Ibuprofen, Naprosyn, etc.) or Lockhart-2 inhibitors (Celebrex, etc.) for 2 weeks following surgery.   You will be given a prescription for pain medication. Our hope is that you will eventually be weaned off all pain medications.   Try not to take the pain medicine unless you need to. If you feel that you do not need something that strong, you may use regular or extra-strength Tylenol instead.   DO NOT drink alcohol, drive or operate heavy machinery while taking your pain medications.   Notify the office if your pain is not controlled or you need a medication refill before your appointment.     YOU SHOULD CALL THE OFFICE -[phone] IF YOU HAVE ANY OF THE FOLLOWING:   Severe or worsening headaches.   Ongoing nausea and/or vomiting.   If you notice any signs of infection such as bleeding, redness, swelling, tenderness, odor, drainage or opening of the incision. Please check your incisions twice daily.   Fevers greater than 101.5 degrees   Clear fluid leaking from the incision.   Flu-like symptoms, chills, shakes, chest pain, shortness of breath, nausea, vomiting, diarrhea   Changes in mental status such as confusion, slurred speech, increased sleepiness.   Any new neurologic sensory or motor deficits (weakness, numbness)   Seizures   Leg swelling or calf tenderness     *If you are unable to contact someone at the office and your symptoms persist or increase, call 911 or go to the emergency department.

## 2025-06-14 NOTE — PROGRESS NOTES
Neurosurgery [facility]/Resident    Daily Progress Note   No chief complaint on file.    6/14/2025  9:40 AM    Chart reviewed.  No acute events overnight.  No new complaints. Mild incisional pain. Tolerating oral diet. Ambulating to BR. Voiding.     Vitals:    06/14/25 0533 06/14/25 0600 06/14/25 0700 06/14/25 0800   BP:  138/86 128/86 (!) 137/99   Pulse:  (!) 103 (!) 113 93   Resp: 18 21 17 16   Temp:       TempSrc:       SpO2:   95% 96%   Weight:       Height:             PE:   AOx3   CNII-XII intact   PERRL, EOMI   Motor   L deltoid 5/5; R deltoid 5/5  L biceps 5/5; R biceps 5/5  L triceps 5/5; R triceps 5/5  L intrinsics 5/5; R intrinsics 5/5      L iliopsoas 5/5 , R iliopsoas 5/5  L quadriceps 5/5; R quadriceps 5/5  L Dorsiflexion 5/5; R dorsiflexion 5/5  L Plantarflexion 5/5; R plantarflexion 5/5  L EHL 5/5; R EHL 5/5    Sensation intact    Drain output   RODOLFO drain 45 ml/12h  Incision cranial site, c/d       Lab Results   Component Value Date    WBC 4.5 06/13/2025    HGB 11.6 (L) 06/13/2025    HCT 37.0 06/13/2025     06/13/2025    CHOL 155 04/11/2025    TRIG 152 (H) 04/11/2025    HDL 34 (L) 04/11/2025    ALT 86 (H) 05/04/2025    AST 58 (H) 05/04/2025     06/13/2025    K 4.0 06/13/2025     06/13/2025    CREATININE 0.8 06/13/2025    BUN 7 06/13/2025    CO2 21 06/13/2025    TSH 0.330 (L) 04/29/2025    INR 1.1 04/13/2025    LABA1C 5.1 04/11/2025    CRP 4.5 05/07/2021    SEDRATE 33 (H) 05/04/2025       Radiology   CT HEAD WITHOUT CONTRAST  06/14/2025 04:38:01 AM  IMPRESSION:  1. No acute intracranial abnormality.  2. Postsurgical changes from left parietal craniotomy with small area of encephalomalacia in the left temporoparietal region.  This area is improved since 4/16/25.    A/P  39 y.o. female who presents with GBM, absence of bone skull  POD 1 s/p left temporal cranioplasty     - activity as tolerated  - diet as tolerated  - continue RODOLFO drain, monitor output  - discharge planning - discharged once  drain removed       Please contact neurosurgery with any changes in patients neurologic status.       Mikael Hernández, CNP  6/14/25  9:40 AM

## 2025-06-14 NOTE — PLAN OF CARE
Problem: Discharge Planning  Goal: Discharge to home or other facility with appropriate resources  Outcome: Adequate for Discharge     Problem: Pain  Goal: Verbalizes/displays adequate comfort level or baseline comfort level  Outcome: Adequate for Discharge     Problem: Chronic Conditions and Co-morbidities  Goal: Patient's chronic conditions and co-morbidity symptoms are monitored and maintained or improved  Outcome: Adequate for Discharge     Problem: Seizure Precautions  Goal: Remains free of injury related to seizures activity  Outcome: Adequate for Discharge     Problem: Safety - Adult  Goal: Free from fall injury  Outcome: Adequate for Discharge     Problem: ABCDS Injury Assessment  Goal: Absence of physical injury  Outcome: Adequate for Discharge

## 2025-06-14 NOTE — PROGRESS NOTES
Daily Progress Note  Neuro Critical Care    Patient Name: Jack Yu  Patient : 1985  Room/Bed: 0119/0119-01  Code Status: Full code  Allergies:   Allergies   Allergen Reactions    Latex Itching    Oxycodone-Acetaminophen Itching     Vomiting    Reglan [Metoclopramide] Anxiety       CHIEF COMPLAINT:        GBM(glioblastoma multiforme)     INTERVAL HISTORY    Initial Presentation (Admitted 2025):    39-year-old female with a past medical history significant for anxiety, paroxysmal atrial fibrillation, GERD, hypertension presenting to the neuro ICU status post left temporal cranioplasty with neurosurgery.  Glioblastoma multiforme.    Hospital Course:   : Patient arrived to the neuro ICU with subgaleal drain.  Patient endorsing and was given Fioricet which did provide symptomatic relief.  Regular diet resumed    Interval Events:   No acute events overnight.  Patient received Norco x2 overnight which provided some symptomatic pain relief.  Cardene drip was paused shortly after arrival to the unit.  Home medications were resumed. Overnight had 75cc output from subgaleal drain. Neurosurgery is primary and we plan to sign off today    CURRENT MEDICATIONS:  SCHEDULED MEDICATIONS:   levETIRAcetam  500 mg Oral BID    mirtazapine  15 mg Oral Nightly    pantoprazole  40 mg Oral Daily    sodium chloride flush  5-40 mL IntraVENous 2 times per day    polyethylene glycol  17 g Oral Daily    ceFAZolin  2,000 mg IntraVENous Q8H    amLODIPine  5 mg Oral Daily     CONTINUOUS INFUSIONS:   sodium chloride      lactated ringers 125 mL/hr at 25 1611    niCARdipine Stopped (25 1654)     PRN MEDICATIONS:   ondansetron, sodium chloride flush, sodium chloride, diphenhydrAMINE, senna, bisacodyl, HYDROmorphone **OR** HYDROmorphone, butalbital-acetaminophen-caffeine, HYDROcodone 5 mg - acetaminophen, HYDROcodone 5 mg - acetaminophen    VITALS:  Temperature Range: Temp: 98.2 °F (36.8 °C) Temp  Av °F  (36.7 °C)  Min: 97.3 °F (36.3 °C)  Max: 98.5 °F (36.9 °C)  BP Range: Systolic (24hrs), Av , Min:115 , Max:165     Diastolic (24hrs), Av, Min:71, Max:113    Pulse Range: Pulse  Av.3  Min: 89  Max: 114  Respiration Range: Resp  Avg: 15.6  Min: 12  Max: 25  Current Pulse Ox: SpO2: 96 %  24HR Pulse Ox Range: SpO2  Av.1 %  Min: 93 %  Max: 100 %  Patient Vitals for the past 12 hrs:   BP Temp Temp src Pulse Resp SpO2   25 0533 -- -- -- -- 18 --   25 0503 (!) 145/93 -- -- (!) 111 16 --   25 0400 138/87 98.2 °F (36.8 °C) Oral (!) 109 25 96 %   25 0300 127/74 -- -- 99 15 94 %   25 0200 136/83 98.5 °F (36.9 °C) Oral (!) 108 17 --   25 0100 115/83 -- -- (!) 103 15 --   25 0000 130/84 97.7 °F (36.5 °C) Oral (!) 101 15 --   25 2300 120/71 -- -- (!) 104 14 93 %   25 2205 (!) 140/86 97.8 °F (36.6 °C) Oral 97 16 --   259 -- -- -- -- 16 --   25 -- -- -- -- 18 95 %   25 2100 (!) 132/92 -- -- 98 16 --   25 2000 132/84 98.4 °F (36.9 °C) Oral (!) 109 13 96 %   25 1900 (!) 128/91 -- -- 97 14 93 %     Estimated body mass index is 25.84 kg/m² as calculated from the following:    Height as of this encounter: 1.702 m (5' 7\").    Weight as of this encounter: 74.8 kg (165 lb).  []<16 Severe malnutrition  []16-16.99 Moderate malnutrition  []17-18.49 Mild malnutrition  []18.5-24.9 Normal  [x]25-29.9 Overweight (not obese)  []30-34.9 Obese class 1 (Low Risk)  []35-39.9 Obese class 2 (Moderate Risk)  []>=40 Obese class 3 (High Risk)    RECENT LABS:   Lab Results   Component Value Date    WBC 4.5 2025    HGB 11.6 (L) 2025    HCT 37.0 2025     2025    CHOL 155 2025    TRIG 152 (H) 2025    HDL 34 (L) 2025    ALT 86 (H) 2025    AST 58 (H) 2025     2025    K 4.0 2025     2025    CREATININE 0.8 2025    BUN 7 2025    CO2 21 2025    TSH

## 2025-06-14 NOTE — PROGRESS NOTES
Discharge paperwork reviewed with patient and family, all questions answered. Patient provided with belongings and brought to family's vehicle via wheelchair.

## 2025-06-14 NOTE — PROGRESS NOTES
Physical Therapy  Facility/Department: [address] NEURO ICU   Physical Therapy Initial Evaluation    Patient Name: Jack Yu        MRN: [id number]    : 1985    Date of Service: 2025    Past Medical History:  has a past medical history of Anxiety, Atrial fib/flutter, transient (HCC), CVA (cerebral vascular accident) (HCC), GERD (gastroesophageal reflux disease), Glioblastoma multiforme (HCC), H/O headache, Hypertension, Intracranial hemorrhage (HCC), Nausea, PONV (postoperative nausea and vomiting), Thyroid nodule, Ulcer of gastric fundus, Under care of service provider, Under care of service provider, Under care of service provider, and Under care of service provider.  Past Surgical History:  has a past surgical history that includes Tubal ligation (Bilateral, ); Ovary removal (Left); Upper gastrointestinal endoscopy (2015); Hysterectomy; Upper gastrointestinal endoscopy (N/A, 2021); Cosmetic surgery; Cleft lip repair; Upper gastrointestinal endoscopy (N/A, 2021); craniotomy (Left, 04/10/2025); Knee arthroscopy (2020); and Cranioplasty (Left, 2025).    Discharge Recommendations  Discharge Recommendations: No therapy recommended at discharge  PT Equipment Recommendations  Equipment Needed: No    Assessment  Assessment: Pt ambulated 150ft w/ no AD with supervision provided for patient safety. Pt demonstrates independence in performance of functional mobility and presents with no skilled PT needs at this time. Pt educated on purpose of PT eval, POC, and importance of continued mobility during admission. Pt verbalizing no concerns in regards to functional mobility upon discharge. D/C PT.  Therapy Prognosis: Good  Decision Making: Low Complexity  Requires PT Follow-Up: No  Activity Tolerance  Activity Tolerance: Patient tolerated evaluation without incident  Safety Devices  Type of Devices: Nurse notified, Left in bed, Call light within reach  Restraints  Restraints

## 2025-06-14 NOTE — PROGRESS NOTES
Occupational Therapy  Occupational Therapy Initial Evaluation  Facility/Department: [address] NEURO ICU   Patient Name: Jack Yu        MRN: [id number]    : 1985    Date of Service: 2025    PER CHART \"Jack Yu is a 39 y.o. female who is scheduled today for CRANIOPLASTY (SUGITA, SUPINE, HORSESHOE HEADREST, PATIENT'S BONE FLAP) - Left. Patient reports in April of this year she was found down by her mother following a stroke. Patient denies any symptoms preceding her stroke. On imaging study she was found to have a glioblastoma multiforme. Patient had craniotomy 2025. She states since this time, her speech and headaches have improved. She states she continues to have nausea and unfocused vision at times. She denies any blurred vision, dizziness, imbalance, numbness tingling or weakness\"     Past Medical History:  has a past medical history of Anxiety, Atrial fib/flutter, transient (HCC), CVA (cerebral vascular accident) (HCC), GERD (gastroesophageal reflux disease), Glioblastoma multiforme (HCC), H/O headache, Hypertension, Intracranial hemorrhage (HCC), Nausea, PONV (postoperative nausea and vomiting), Thyroid nodule, Ulcer of gastric fundus, Under care of service provider, Under care of service provider, Under care of service provider, and Under care of service provider.  Past Surgical History:  has a past surgical history that includes Tubal ligation (Bilateral, ); Ovary removal (Left); Upper gastrointestinal endoscopy (2015); Hysterectomy; Upper gastrointestinal endoscopy (N/A, 2021); Cosmetic surgery; Cleft lip repair; Upper gastrointestinal endoscopy (N/A, 2021); craniotomy (Left, 04/10/2025); Knee arthroscopy (2020); and Cranioplasty (Left, 2025).    Discharge Recommendations: No therapy recommended at discharge.         OT Equipment Recommendations  Equipment Needed: No    Assessment  Performance deficits / Impairments: Decreased

## 2025-06-15 NOTE — DISCHARGE SUMMARY
Department of Neurosurgery                                            Discharge Summary       PATIENT NAME: Jack Yu  YOB: 1985  MEDICAL RECORD NO. [id number]  DATE: 6/15/2025  PRIMARY CARE PHYSICIAN: Debbie Villareal MD  DISCHARGE DATE:  6/14/2025  2:59 PM  DISCHARGE DIAGNOSIS:   Patient Active Problem List   Diagnosis Code    High-risk pregnancy O09.90    Epigastric pain R10.13    Nausea R11.0    Bloating R14.0    Generalized abdominal pain R10.84    Right upper quadrant pain R10.11    Abdominal pain, right upper quadrant R10.11    Other irritable bowel syndrome K58.8    Anxiety F41.9    Abdominal pain R10.9    Intracerebral hemorrhage, nontraumatic (HCC) I61.9    Intracranial hemorrhage (HCC) I62.9    Altered mental status R41.82    Cerebral edema (HCC) G93.6    Cale coma scale total score 3-8 (HCC) R40.2430    Uncal herniation (HCC) G93.5    Midline shift of brain due to hematoma (HCC) S06.2XAA, S06.A0XA    Glioma (HCC) C71.9    Absence of bone of skull Q75.8    GBM (glioblastoma multiforme) ([facility]) C71.9     DISPOSITION: Home    PROCEDURES:    Left temporal cranioplasty using autograft  Ultrasound-guided external ventricular drain placement    HOSPITAL COURSE     Jack Yu originally presented to the hospital on 6/13/2025  8:43 AM with absence of bone of skull, nontraumatic subcortical hemorrhage found to be glioblastoma. She was admitted and taken to the OR for neurosurgery for procedure listed above.  Patient's drain was removed when found to have low output without complications. Patient tolerated all procedures well.     Labs and imaging were followed daily.  At time of discharge, Jack Yu was tolerating a regular diet, passing flatus, ambulating on her own accord and had adequate analgesia on oral pain medications, and had no signs of symptoms of complications.  She is medically stable to be discharged.       PHYSICAL EXAMINATION        Discharge

## 2025-06-17 ENCOUNTER — TELEPHONE (OUTPATIENT)
Dept: NEUROSURGERY | Age: 40
End: 2025-06-17

## 2025-06-17 ENCOUNTER — PATIENT MESSAGE (OUTPATIENT)
Dept: NEUROSURGERY | Age: 40
End: 2025-06-17

## 2025-06-17 NOTE — TELEPHONE ENCOUNTER
Spoke with Marcia patient's mother, she's taking a few pictures to upload into [employer] for review.

## 2025-06-17 NOTE — TELEPHONE ENCOUNTER
Patient Mother called into office reporting Patient has left sided facial swelling. Denies any other symptoms    Will like to know if this a normal reaction?????    Call Back # [phone]

## 2025-07-01 ENCOUNTER — OFFICE VISIT (OUTPATIENT)
Dept: NEUROSURGERY | Age: 40
End: 2025-07-01

## 2025-07-01 VITALS
HEART RATE: 90 BPM | WEIGHT: 165.8 LBS | BODY MASS INDEX: 26.02 KG/M2 | SYSTOLIC BLOOD PRESSURE: 118 MMHG | DIASTOLIC BLOOD PRESSURE: 86 MMHG | TEMPERATURE: 98.2 F | HEIGHT: 67 IN

## 2025-07-01 DIAGNOSIS — Z98.890 S/P CRANIOTOMY: ICD-10-CM

## 2025-07-01 DIAGNOSIS — C71.9 GBM (GLIOBLASTOMA MULTIFORME) (HCC): Primary | ICD-10-CM

## 2025-07-01 DIAGNOSIS — Z98.890 HISTORY OF CRANIOPLASTY: ICD-10-CM

## 2025-07-01 PROCEDURE — 99024 POSTOP FOLLOW-UP VISIT: CPT

## 2025-07-01 NOTE — PROGRESS NOTES
Therapy, Hematology/Oncology, and Radiation Oncology. We will continue to monitor her neurologic status and post-op recovery. She will follow up with Dr. Palmer as scheduled on 8/11/25 or sooner if symptoms worsen.    Continue with SLP, Hematology/Oncology, and Radiation/Oncology as planned.  Follow up with Dr. Palmer 8/11 as planned     Followup: Return if symptoms worsen or fail to improve.    Prescriptions Ordered:  No orders of the defined types were placed in this encounter.       Orders Placed:  No orders of the defined types were placed in this encounter.       Electronically signed by ALEX Savage CNP on 7/6/2025 at 10:57 AM    Please note that this chart was generated using voice recognition Dragon dictation software.  Although every effort was made to ensure the accuracy of this automated transcription, some errors in transcription may have occurred.

## 2025-07-03 ENCOUNTER — TELEPHONE (OUTPATIENT)
Dept: NEUROSURGERY | Age: 40
End: 2025-07-03

## 2025-07-03 NOTE — TELEPHONE ENCOUNTER
Patient came in to drop off form for Medical Eligibility. She stated that either Dr Palmer or [place] can sign it.

## 2025-08-11 ENCOUNTER — OFFICE VISIT (OUTPATIENT)
Dept: NEUROSURGERY | Age: 40
End: 2025-08-11
Payer: COMMERCIAL

## 2025-08-11 VITALS
WEIGHT: 164 LBS | BODY MASS INDEX: 25.74 KG/M2 | SYSTOLIC BLOOD PRESSURE: 125 MMHG | HEIGHT: 67 IN | HEART RATE: 90 BPM | DIASTOLIC BLOOD PRESSURE: 77 MMHG

## 2025-08-11 DIAGNOSIS — Q75.8: ICD-10-CM

## 2025-08-11 DIAGNOSIS — I61.0 NONTRAUMATIC SUBCORTICAL HEMORRHAGE OF LEFT CEREBRAL HEMISPHERE (HCC): ICD-10-CM

## 2025-08-11 DIAGNOSIS — C71.9 GBM (GLIOBLASTOMA MULTIFORME) (HCC): Primary | ICD-10-CM

## 2025-08-11 PROCEDURE — 1036F TOBACCO NON-USER: CPT | Performed by: NEUROLOGICAL SURGERY

## 2025-08-11 PROCEDURE — 99213 OFFICE O/P EST LOW 20 MIN: CPT | Performed by: NEUROLOGICAL SURGERY

## 2025-08-11 PROCEDURE — G8419 CALC BMI OUT NRM PARAM NOF/U: HCPCS | Performed by: NEUROLOGICAL SURGERY

## 2025-08-11 PROCEDURE — G8427 DOCREV CUR MEDS BY ELIG CLIN: HCPCS | Performed by: NEUROLOGICAL SURGERY

## (undated) DEVICE — GLOVE SURG SZ 85 L12IN FNGR THK79MIL GRN LTX FREE

## (undated) DEVICE — FORCEPS BX L240CM WRK CHN 2.8MM STD CAP W/ NDL MIC MESH

## (undated) DEVICE — BLADE ES ELASTOMERIC COAT INSUL DURABLE BEND UPTO 90DEG

## (undated) DEVICE — FORCEPS BX L240CM JAW DIA2.4MM ORNG L CAP W/ NDL DISP RAD

## (undated) DEVICE — BITEBLOCK 54FR W/ DENT RIM BLOX

## (undated) DEVICE — DRAPE,REIN 53X77,STERILE: Brand: MEDLINE

## (undated) DEVICE — SUTURE VICRYL SZ 2-0 L18IN ABSRB VLT L26MM SH 1/2 CIR J775D

## (undated) DEVICE — SUTURE VICRYL SZ 2-0 L18IN ABSRB UD CT-1 L36MM 1/2 CIR J839D

## (undated) DEVICE — CUP MED 1OZ CLR POLYPR FEED GRAD W/O LID

## (undated) DEVICE — 450 ML BOTTLE OF 0.05% CHLORHEXIDINE GLUCONATE IN 99.95% STERILE WATER FOR IRRIGATION, USP AND APPLICATOR.: Brand: IRRISEPT ANTIMICROBIAL WOUND LAVAGE

## (undated) DEVICE — AGENT HEMOSTATIC SURGIFLOW MATRIX KIT W/THROMBIN

## (undated) DEVICE — SUTURE MONOCRYL SZ 3-0 L27IN ABSRB UD L26MM SH 1/2 CIR Y416H

## (undated) DEVICE — DRESSING,GAUZE,XEROFORM,CURAD,1"X8",ST: Brand: CURAD

## (undated) DEVICE — LARGE BLUNT, DUAL PACK: Brand: LINA SKIN HOOK™

## (undated) DEVICE — CODMAN® BACTISEAL® CLEAR EVD CATHETER SET: Brand: CODMAN® BACTISEAL®

## (undated) DEVICE — GARMENT,MEDLINE,DVT,INT,CALF,MED, GEN2: Brand: MEDLINE

## (undated) DEVICE — AGENT HEMSTAT W2XL4IN OXIDIZED REGENERATED CELOS ABSRB

## (undated) DEVICE — BASIN EMSIS 700ML GRAPHITE PLAS KID SHP GRAD

## (undated) DEVICE — Device: Brand: SNAP ON SPHERZ

## (undated) DEVICE — STERILE POLYISOPRENE POWDER-FREE SURGICAL GLOVES WITH EMOLLIENT COATING: Brand: PROTEXIS

## (undated) DEVICE — GOWN,SIRUS,NONRNF,SETINSLV,XL,20/CS: Brand: MEDLINE

## (undated) DEVICE — GLOVE SURG SZ 75 CRM LTX FREE POLYISOPRENE POLYMER BEAD ANTI

## (undated) DEVICE — ZYPHR DISPOSABLE CRANIAL PERFORATOR, LARGE 14/11MM: Brand: ZYPHR

## (undated) DEVICE — Device: Brand: DEFENDO VALVE AND CONNECTOR KIT

## (undated) DEVICE — SPONGE,NEURO,0.5"X3",XR,STRL,LF,10/PK: Brand: MEDLINE

## (undated) DEVICE — BATTERY PACK FOR VARISPEED: Brand: STRYKER VARISPEED

## (undated) DEVICE — KIT DRN FLAT W/ 100CC EVAC 7MM FULL PERF

## (undated) DEVICE — ADAPTER TBNG LUER STUB 15 GA INTMED

## (undated) DEVICE — GLOVE ORANGE PI 7   MSG9070

## (undated) DEVICE — COVER LT HNDL BLU PLAS

## (undated) DEVICE — SUTURE NRLN SZ 4-0 L18IN NONABSORBABLE BLK L13MM TF 1/2 CIR C584D

## (undated) DEVICE — CLAMP SCALP STR EDGE HVY

## (undated) DEVICE — PROVE COVER: Brand: UNBRANDED

## (undated) DEVICE — GAUZE,SPONGE,FLUFF,6"X6.75",STRL,5/TRAY: Brand: MEDLINE

## (undated) DEVICE — APPLICATOR MEDICATED 10.5 CC SOLUTION HI LT ORNG CHLORAPREP

## (undated) DEVICE — DRAPE MICSCP W117XL305CM DIA68MM LENS W VARI LENS2 FOR LEICA

## (undated) DEVICE — JELLY,LUBE,STERILE,FLIP TOP,TUBE,2-OZ: Brand: MEDLINE

## (undated) DEVICE — DISPOSABLE BIPOLAR FORCEPS 8" (20.3CM) COHEN BAYONET, INSULATED, IRRIGATING, 1.5MM TIP: Brand: KIRWAN

## (undated) DEVICE — MEDICINE CUP, GRADUATED, STER: Brand: MEDLINE

## (undated) DEVICE — CONTAINER,SPECIMEN,4OZ,OR STRL: Brand: MEDLINE

## (undated) DEVICE — CO2 CANNULA,SUPERSOFT, ADLT,7'O2,7'CO2: Brand: MEDLINE

## (undated) DEVICE — STRAP ARMBRD W1.5XL32IN FOAM STR YET SFT W/ HK AND LOOP

## (undated) DEVICE — THE STERILE LIGHT HANDLE COVER IS USED WITH STERIS SURGICAL LIGHTING AND VISUALIZATION SYSTEMS.

## (undated) DEVICE — MARKER,SKIN,WI/RULER AND LABELS: Brand: MEDLINE

## (undated) DEVICE — DEFENDO AIR WATER SUCTION AND BIOPSY VALVE KIT FOR  OLYMPUS: Brand: DEFENDO AIR/WATER/SUCTION AND BIOPSY VALVE

## (undated) DEVICE — GLOVE SURG 8 11.7IN BEAD CUF LIGHT BRN SENSICARE LTX FREE

## (undated) DEVICE — Device

## (undated) DEVICE — ELECTRODE PT RET AD L9FT HI MOIST COND ADH HYDRGEL CORDED

## (undated) DEVICE — GLOVE SURG SZ 8 CRM LTX FREE POLYISOPRENE POLYMER BEAD ANTI

## (undated) DEVICE — SUTURE NONABSORBABLE MONOFILAMENT 3-0 PS-1 18 IN BLK ETHILON 1663H

## (undated) DEVICE — GAUZE,SPONGE,4"X4",16PLY,STRL,LF,10/TRAY: Brand: MEDLINE

## (undated) DEVICE — STRAP,POSITIONING,KNEE/BODY,FOAM,4X60": Brand: MEDLINE

## (undated) DEVICE — SYRINGE MED 10ML LUERLOCK TIP W/O SFTY DISP

## (undated) DEVICE — BLOCK BITE 60FR RUBBER ADLT DENTAL

## (undated) DEVICE — MITT SURG PREP L ADH DISPOSABLE

## (undated) DEVICE — PAD,NON-ADHERENT,3X8,STERILE,LF,1/PK: Brand: MEDLINE

## (undated) DEVICE — LIQUIBAND RAPID ADHESIVE 36/CS 0.8ML: Brand: MEDLINE

## (undated) DEVICE — 2.3MM TAPERED ROUTER

## (undated) DEVICE — ENDO KIT W/SYRINGE: Brand: MEDLINE INDUSTRIES, INC.